# Patient Record
Sex: FEMALE | Race: WHITE | NOT HISPANIC OR LATINO | Employment: OTHER | URBAN - METROPOLITAN AREA
[De-identification: names, ages, dates, MRNs, and addresses within clinical notes are randomized per-mention and may not be internally consistent; named-entity substitution may affect disease eponyms.]

---

## 2017-01-03 ENCOUNTER — OFFICE VISIT (OUTPATIENT)
Dept: FAMILY MEDICINE | Facility: CLINIC | Age: 82
End: 2017-01-03

## 2017-01-03 VITALS — HEART RATE: 78 BPM | SYSTOLIC BLOOD PRESSURE: 122 MMHG | DIASTOLIC BLOOD PRESSURE: 80 MMHG | OXYGEN SATURATION: 97 %

## 2017-01-03 DIAGNOSIS — G57.02 LESION OF SCIATIC NERVE, LEFT: Primary | ICD-10-CM

## 2017-01-03 PROCEDURE — 99214 OFFICE O/P EST MOD 30 MIN: CPT | Performed by: FAMILY MEDICINE

## 2017-01-03 RX ORDER — GABAPENTIN 300 MG/1
CAPSULE ORAL
Qty: 90 CAPSULE | Refills: 0 | Status: SHIPPED | OUTPATIENT
Start: 2017-01-03 | End: 2017-01-25

## 2017-01-03 NOTE — PROGRESS NOTES
Left buttock pain starting about a week ago, getting bad about 5 days ago. She has had episodes like this in the past. No history of back trouble. Some radiation to the outside of the thigh. She iced it, and has used tylenol for this. No weakness  OBJECTIVE: /80 mmHg  Pulse 78  SpO2 97% NAD talkative. DTR's are fine. Strength is fine. Tenderness along the sciatic course. 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

## 2017-01-04 ASSESSMENT — PATIENT HEALTH QUESTIONNAIRE - PHQ9: SUM OF ALL RESPONSES TO PHQ QUESTIONS 1-9: 7

## 2017-01-06 ENCOUNTER — TELEPHONE (OUTPATIENT)
Dept: FAMILY MEDICINE | Facility: CLINIC | Age: 82
End: 2017-01-06

## 2017-01-09 ENCOUNTER — TELEPHONE (OUTPATIENT)
Dept: FAMILY MEDICINE | Facility: CLINIC | Age: 82
End: 2017-01-09

## 2017-01-10 NOTE — TELEPHONE ENCOUNTER
----- Message from Amadeo Macias MD sent at 1/9/2017  5:44 PM CST -----  Regarding: RE: sciatic not better   I would have her increase the gabapentin to 2 am, 1 noon, 1 pm; increase by one every 3 days up to 2 tid.  ----- Message -----     From: Leidy Sharma RN     Sent: 1/9/2017  10:40 AM       To: Amadeo Macias MD  Subject: sciatic not better                               Dr. Macias - she left message saying sciatica is not any better and asking what else can be done?   What do you recommend she do next?   Thanks  Leidy

## 2017-01-10 NOTE — TELEPHONE ENCOUNTER
1/10/17 patient calls - due to snowy weather, daughter unable to get her into clinic today. She plans to try increasing arin back to 1 TID and see how does. Will call if questions or concerns.  Leidy Sharma RN

## 2017-01-10 NOTE — TELEPHONE ENCOUNTER
"Called patient with Dr. Macias's recommendation - she is hesitant to increase arin to 3 per day since it caused her to be \"goofy, loopy\" last week at that dose. She is currently taking 300mg BID and tolerating but not getting any pain relief. States pain is severe and not sure what else to do. Offered appt with MD tomorrow to discuss again - patient states can't even walk to get in here. Advised if the pain is that bad, then maybe needs ER eval.   Discussed at length with patient unable to decide what she wants to do. Finally states will discuss with her daughter tomorrow and call NADIR.  Leidy Sharma RN    "

## 2017-01-12 ENCOUNTER — TELEPHONE (OUTPATIENT)
Dept: FAMILY MEDICINE | Facility: CLINIC | Age: 82
End: 2017-01-12

## 2017-01-12 NOTE — TELEPHONE ENCOUNTER
Tina called and states she is still having significant back pain.  She had tried a gel from her neighbor that seemed to help some but still experiencing back pain.  She is on the 3rd day of gabapentin 300 mg tid.  She was told to increase by one tab every 3 days until max of two  Tid.  Discussed if pain is bad she needs to go to the ER.  Also talked with daughter on the plan if worse to the ER and told how to increase the gabapentin.  Discussed taking tylenol 500 mg and could take up to two tabs tid.

## 2017-01-14 ENCOUNTER — TELEPHONE (OUTPATIENT)
Dept: FAMILY MEDICINE | Facility: CLINIC | Age: 82
End: 2017-01-14

## 2017-01-15 NOTE — TELEPHONE ENCOUNTER
Patient called and wondering if she could be on an anti inflammatory in addition to gabapentin.  She did admit she felt the back pain was slightly better.  She is tapering up on the gabapentin and taking 1 in am 1 mid day and 2 in the evening. Talked with Dr. Amadeo Macias and he said she could take Aleve -2 tabs bid with food for 2 weeks.  Informed patient and she will call if pain does not get better.

## 2017-01-17 ENCOUNTER — TELEPHONE (OUTPATIENT)
Dept: FAMILY MEDICINE | Facility: CLINIC | Age: 82
End: 2017-01-17

## 2017-01-26 NOTE — TELEPHONE ENCOUNTER
1/17/17 daughter, Rosario Raymond, calls to update Dr. Tian on status of patient's driving assessment. States assessment was scheduled for 1/19/17 but daughter rescheduled due to patient struggling last few weeks with severe back pain. Been seeing Dr. Macias about this. Has not driven in 3 weeks.   Plan: Dr. Tian informed.  Leidy Sharma RN

## 2017-02-13 DIAGNOSIS — F33.1 MAJOR DEPRESSIVE DISORDER, RECURRENT EPISODE, MODERATE (H): ICD-10-CM

## 2017-02-13 RX ORDER — VENLAFAXINE HYDROCHLORIDE 75 MG/1
CAPSULE, EXTENDED RELEASE ORAL
Qty: 90 CAPSULE | Refills: 3 | Status: SHIPPED | OUTPATIENT
Start: 2017-02-13 | End: 2018-02-22

## 2017-02-24 DIAGNOSIS — R10.13 DYSPEPSIA: ICD-10-CM

## 2017-02-24 DIAGNOSIS — R05.3 CHRONIC COUGH: ICD-10-CM

## 2017-02-27 ENCOUNTER — TELEPHONE (OUTPATIENT)
Dept: FAMILY MEDICINE | Facility: CLINIC | Age: 82
End: 2017-02-27

## 2017-02-27 DIAGNOSIS — M54.42 LEFT-SIDED LOW BACK PAIN WITH LEFT-SIDED SCIATICA: Primary | ICD-10-CM

## 2017-02-27 NOTE — TELEPHONE ENCOUNTER
Call from patient 02/23 that she has been seeing a chiropractor for her back but still having back pain.  She wants MRI done   Per Dr Macias patient can get MRI of lower back.   Dx:  Low back pain, hip, thigh pain.   Order sent to [facility] for patient to get done in [place] office.

## 2017-03-10 ENCOUNTER — TELEPHONE (OUTPATIENT)
Dept: FAMILY MEDICINE | Facility: CLINIC | Age: 82
End: 2017-03-10

## 2017-03-10 ENCOUNTER — TRANSFERRED RECORDS (OUTPATIENT)
Dept: FAMILY MEDICINE | Facility: CLINIC | Age: 82
End: 2017-03-10

## 2017-03-13 ENCOUNTER — TELEPHONE (OUTPATIENT)
Dept: FAMILY MEDICINE | Facility: CLINIC | Age: 82
End: 2017-03-13

## 2017-03-13 DIAGNOSIS — M51.369 DDD (DEGENERATIVE DISC DISEASE), LUMBAR: Primary | ICD-10-CM

## 2017-03-13 NOTE — TELEPHONE ENCOUNTER
Per MKW order entered to see Dr. Thompson at [facility] faxed with MRI results.  Patient states that she is not likely to f/u with surgeon however referral has been sent so she can determine this at a later date.  Ricardo

## 2017-04-18 ENCOUNTER — TELEPHONE (OUTPATIENT)
Dept: FAMILY MEDICINE | Facility: CLINIC | Age: 82
End: 2017-04-18

## 2017-04-18 DIAGNOSIS — F41.9 ANXIETY: Primary | ICD-10-CM

## 2017-04-18 DIAGNOSIS — F33.1 MAJOR DEPRESSIVE DISORDER, RECURRENT EPISODE, MODERATE (H): ICD-10-CM

## 2017-04-18 RX ORDER — ALPRAZOLAM 0.5 MG
0.5 TABLET ORAL PRN
Qty: 20 TABLET | Refills: 0 | Status: ON HOLD | COMMUNITY
Start: 2017-04-18 | End: 2018-06-13

## 2017-04-18 RX ORDER — VENLAFAXINE HYDROCHLORIDE 37.5 MG/1
CAPSULE, EXTENDED RELEASE ORAL
Qty: 30 CAPSULE | Refills: 1 | Status: SHIPPED | OUTPATIENT
Start: 2017-04-18 | End: 2017-06-08

## 2017-04-18 NOTE — TELEPHONE ENCOUNTER
"Patient calls reporting anxiety and depression worse lately. 4 months of sciatica getting her down - this is slowly getting better.   2 months ago son in law  and last week another death in family and  .   States needs something to help get by - asks for small amount xanax. States has used many years ago and daughter using small dose now with great results.   Also states that her daughter ('who is a nurse\") tells her to ask Dr. Tian if could increase her venlafaxine as current dose is not effective enough.   Plan: advised Dr. Tian is not in clinic today and will talk to her tomorrow to see if willing to rx or if needs to RTC to be seen. Patient agrees and will await our call.  Leidy Sharma RN    "

## 2017-04-18 NOTE — TELEPHONE ENCOUNTER
17 5pm Dr. Tian reviewed message from RN and responds  -- Okay for xanax 0.5 mg #20, no RF, may increase venlafaxine to 112.5 mg daily, then make follow up appointment for after the .     Patient currently gets venlafaxine ER 75mg capsules. Called pharmacist - recommends send additional rx for 37.5mg ER capsules to take with the 75mg capsule daily.   Alprazolam rx and venlafaxine ER rx sent to pharmacy and patient informed of Dr. Tian's recommendation. Should call as soon as able to make that follow up appt - 30 minute appt. Called patient and explained med dosing in detail and to follow up with Dr. Tian in next month. Patient agrees and will call early next week to make appt.   Leidy Sharma RN

## 2017-04-26 ENCOUNTER — TELEPHONE (OUTPATIENT)
Dept: FAMILY MEDICINE | Facility: CLINIC | Age: 82
End: 2017-04-26

## 2017-04-26 DIAGNOSIS — R10.13 DYSPEPSIA: Primary | ICD-10-CM

## 2017-04-26 DIAGNOSIS — R05.3 CHRONIC COUGH: ICD-10-CM

## 2017-04-26 RX ORDER — LANSOPRAZOLE 30 MG/1
30 CAPSULE, DELAYED RELEASE ORAL DAILY
Qty: 30 CAPSULE | Refills: 2 | Status: SHIPPED | OUTPATIENT
Start: 2017-04-26 | End: 2017-05-01

## 2017-04-26 NOTE — TELEPHONE ENCOUNTER
[facility] pharmacy calls regarding potential drug interaction between clopidogrel and omeprazole that patient takes. Pharm asks if Dr. Tian willing to change med to ranitidine or some other stomach med.   Per chart -- Patient has been on clopidogrel since 8/2013 CVA and omeprazole since 6/2016 for chronic cough.    Plan: per Dr. Tian - change to lansoprazole 30mg qd. Rx sent to pharmacy and patient informed.  Leidy Sharma RN

## 2017-05-01 NOTE — TELEPHONE ENCOUNTER
Pharmacist calling again regarding omep and plavix. Lansoprazole ordered last week in omep's place is not covered  -- other ppi's also would need prior authorization. omep covered well.   Patient and daughter, calling pharmacy frequently to clear up this issue. If ok to continue omep, need new rx sent.     Per our clinical pharmacist, Kadie: The evidence is not very clear and they have backed down on the extent of the interaction, so I think it is fine to continue. If Dr. Tian wants to switch the PPI, pantoprazole does not impact CYP2c9 like omeprazole, so in theory is less risk, but do not think it is absolutely necessary to switch given the current evidence.     Kadie Pruett, Pharm.D, [facility]   Medication Therapy Management Pharmacist   [phone]     Dr. Tian is out of office until 5/3. Ok per Dr. Macias (rehana TOLBERT) to continue omeprazole. Rx sent to pharmacy.  Leidy Sharma RN

## 2017-06-08 DIAGNOSIS — F33.1 MAJOR DEPRESSIVE DISORDER, RECURRENT EPISODE, MODERATE (H): ICD-10-CM

## 2017-06-08 RX ORDER — VENLAFAXINE HYDROCHLORIDE 37.5 MG/1
CAPSULE, EXTENDED RELEASE ORAL
Qty: 90 CAPSULE | Refills: 1 | Status: SHIPPED | OUTPATIENT
Start: 2017-06-08 | End: 2017-12-06

## 2017-07-26 ENCOUNTER — OFFICE VISIT (OUTPATIENT)
Dept: FAMILY MEDICINE | Facility: CLINIC | Age: 82
End: 2017-07-26

## 2017-07-26 VITALS
TEMPERATURE: 97.5 F | HEART RATE: 79 BPM | WEIGHT: 159 LBS | SYSTOLIC BLOOD PRESSURE: 130 MMHG | DIASTOLIC BLOOD PRESSURE: 70 MMHG | BODY MASS INDEX: 26.46 KG/M2 | OXYGEN SATURATION: 97 %

## 2017-07-26 DIAGNOSIS — E53.8 VITAMIN B12 DEFICIENCY (NON ANEMIC): ICD-10-CM

## 2017-07-26 DIAGNOSIS — D22.9 SUSPICIOUS NEVUS: ICD-10-CM

## 2017-07-26 DIAGNOSIS — R30.0 DYSURIA: Primary | ICD-10-CM

## 2017-07-26 LAB
BACTERIA URINE: ABNORMAL
BILIRUB UR QL STRIP: 0
BLOOD URINE DIP: ABNORMAL
CASTS/LPF: ABNORMAL
COLOR UR: YELLOW
CRYSTAL URINE: ABNORMAL
EPITHELIAL CELLS - QUEST: ABNORMAL
GLUCOSE UR STRIP-MCNC: 0 MG/DL
KETONES UR QL STRIP: 0
LEUKOCYTE ESTERASE URINE DIP: ABNORMAL
MUCOUS URINE: ABNORMAL
NITRITE UR QL STRIP: ABNORMAL
PH UR STRIP: 5.5 PH (ref 5–9)
PROT UR QL: ABNORMAL MG/DL (ref ?–0.01)
RBC URINE: ABNORMAL (ref 0–3)
SP GR UR STRIP: 1.01 (ref 1–1.02)
UROBILINOGEN UR QL STRIP: 0.2 EU/DL (ref 0.2–1)
WBC URINE: ABNORMAL (ref 0–3)

## 2017-07-26 PROCEDURE — 11400 EXC TR-EXT B9+MARG 0.5 CM<: CPT | Performed by: FAMILY MEDICINE

## 2017-07-26 PROCEDURE — 88305 TISSUE EXAM BY PATHOLOGIST: CPT | Mod: 90 | Performed by: FAMILY MEDICINE

## 2017-07-26 PROCEDURE — 88342 IMHCHEM/IMCYTCHM 1ST ANTB: CPT | Mod: 90 | Performed by: FAMILY MEDICINE

## 2017-07-26 PROCEDURE — 99213 OFFICE O/P EST LOW 20 MIN: CPT | Mod: 25 | Performed by: FAMILY MEDICINE

## 2017-07-26 PROCEDURE — 81003 URINALYSIS AUTO W/O SCOPE: CPT | Performed by: FAMILY MEDICINE

## 2017-07-26 RX ORDER — CIPROFLOXACIN 250 MG/1
250 TABLET, FILM COATED ORAL 2 TIMES DAILY
Qty: 6 TABLET | Refills: 0 | Status: SHIPPED | OUTPATIENT
Start: 2017-07-26 | End: 2017-08-04

## 2017-07-26 RX ORDER — CYANOCOBALAMIN 1000 UG/ML
1 INJECTION, SOLUTION INTRAMUSCULAR; SUBCUTANEOUS
COMMUNITY

## 2017-07-26 ASSESSMENT — ANXIETY QUESTIONNAIRES
6. BECOMING EASILY ANNOYED OR IRRITABLE: SEVERAL DAYS
1. FEELING NERVOUS, ANXIOUS, OR ON EDGE: SEVERAL DAYS
2. NOT BEING ABLE TO STOP OR CONTROL WORRYING: NOT AT ALL
3. WORRYING TOO MUCH ABOUT DIFFERENT THINGS: SEVERAL DAYS
7. FEELING AFRAID AS IF SOMETHING AWFUL MIGHT HAPPEN: NOT AT ALL
IF YOU CHECKED OFF ANY PROBLEMS ON THIS QUESTIONNAIRE, HOW DIFFICULT HAVE THESE PROBLEMS MADE IT FOR YOU TO DO YOUR WORK, TAKE CARE OF THINGS AT HOME, OR GET ALONG WITH OTHER PEOPLE: SOMEWHAT DIFFICULT
5. BEING SO RESTLESS THAT IT IS HARD TO SIT STILL: NOT AT ALL
GAD7 TOTAL SCORE: 4

## 2017-07-26 ASSESSMENT — PATIENT HEALTH QUESTIONNAIRE - PHQ9: 5. POOR APPETITE OR OVEREATING: SEVERAL DAYS

## 2017-07-26 NOTE — PROGRESS NOTES
Problem(s) Oriented visit        SUBJECTIVE:                                                    Tina Powell is a 84 year old female who presents to clinic today for two issues. She reports having increased darkness of urine color and getting up more for urination during the night. She has started leaking urine again. Symptoms have been present for about a week. She has urgency of urination. No dysuria. No fever. No mid back pain.    She also has some abnormal skin lesions that she wants checked. There is no itching or bleeding. No pain. No personal or family history of skin cancer.        Problem list, Medication list, Allergies, and Medical/Social/Surgical histories reviewed in [facility] and updated as appropriate.   Additional history: as documented    ROS:  5 point ROS completed and negative except noted above, including Gen, CV, Resp, GI, MS      Histories:   Patient Active Problem List   Diagnosis     Psoriasis     Dyslipidemia     Health Care Home     Advance Care Planning     H/O: CVA (cerebrovascular accident)     Cerebral infarction (H)     Risk for falls     Sepsis (H)     Major depressive disorder, recurrent episode, moderate (H)     Alcoholism (H)     Tobacco abuse     Past Surgical History:   Procedure Laterality Date     HYSTERECTOMY  1950    fibroids       Social History   Substance Use Topics     Smoking status: Current Some Day Smoker     Smokeless tobacco: Never Used      Comment: Social, states she does not smoke daily, about 12 cigs per week     Alcohol use 1.8 - 2.4 oz/week     1 - 2 Glasses of wine, 2 Standard drinks or equivalent per week     Family History   Problem Relation Age of Onset     HEART DISEASE Mother      CEREBROVASCULAR DISEASE Father            OBJECTIVE:                                                    /70  Pulse 79  Temp 97.5  F (36.4  C) (Oral)  Wt 72.1 kg (159 lb)  SpO2 97%  BMI 26.46 kg/m2  Body mass index is 26.46 kg/(m^2).   GENERAL APPEARANCE: Alert, no  acute distress  ABDOMEN: soft, no organomegaly, masses or tenderness  MS: extremities normal, no peripheral edema  Back without CVA tenderness  SKIN: mid low back has a variable colored nevus with irregular borders and dark black center.   NEURO: Alert, oriented, speech and mentation normal  PSYCH: mentation appears normal, affect and mood normal    PROCEDURE:  Suspicious nevus was prepped with betadine, Lidocaine 2% with epi was used for anesthetic. Elliptical excision of the nevus was done and the specimen sent for pathology. Three interrupted sutures using 4-0 ethilon were placed with good closure and good hemostasis.     Labs Resulted Today:   Results for orders placed or performed in visit on 07/26/17   Urinalysis w/reflex protein, bili ([facility])   Result Value Ref Range    Color Urine Yellow     pH Urine 5.5 5 - 9 pH    Specific Gravity Urine 1.010 1.005 - 1.025    Protein Urine 1+ (A) 0.01 mg/dL    Glucose Urine 0     Ketones Urine 0     Leukocyte Esterase Urine 3+ (A)     Blood Urine 2+ (A)     Nitrite Urine Neg NEG    Bilirubin Urine Dip 0     Urobilinogen Urine 0.2 0.2 - 1.0 EU/dL    WBC Urine  0 - 3    RBC Urine  0 - 3    Epithelial Cells      Crystal Urine      Bacteria Urine      Mucous Urine      Casts/LPF      Impression    QNS for Micro     ASSESSMENT/PLAN:                                                        Tina was seen today for derm problem, urinary problem and other.    Diagnoses and all orders for this visit:    Dysuria  -     Urinalysis w/reflex protein, bili ([facility])    Other orders  -     DEPRESSION ACTION PLAN (DAP)    Suspicious nevus  Removed today, f/u in ten days for suture removal.     There are no Patient Instructions on file for this visit.    The following health maintenance items are reviewed in Epic and correct as of today:  Health Maintenance   Topic Date Due     INFLUENZA VACCINE (SYSTEM ASSIGNED)  09/01/2017     FALL RISK ASSESSMENT  01/03/2018     PHQ-9 Q6 MONTHS  01/26/2018      DEPRESSION ACTION PLAN Q1 YR  07/26/2018     TETANUS IMMUNIZATION (SYSTEM ASSIGNED)  07/27/2020     ADVANCE DIRECTIVE PLANNING Q5 YRS  11/10/2020     PNEUMOCOCCAL  Completed       Dian Tian MD  [facility]  [phone]    For any issues my office # is [phone]      The following medication was given:     MEDICATION: Vitamin B12  1000mcg  ROUTE: IM  SITE: Arm - Left  DOSE: 1 mL  LOT #: [id number]  :  [employer]  EXPIRATION DATE:  3/2018  NDC#: 2385-4506-00  Brandy Duncan RT(R), MA

## 2017-07-26 NOTE — LETTER
My Depression Action Plan  Name: Tina Powell   Date of Birth 10/7/1932  Date: 7/26/2017    My doctor: Dian Tian   My clinic: [facility] [address] [place] [address] [phone]          GREEN    ZONE   Good Control    What it looks like:     Things are going generally well. You have normal up s and down s. You may even feel depressed from time to time, but bad moods usually last less than a day.   What you need to do:  1. Continue to care for yourself (see self care plan)  2. Check your depression survival kit and update it as needed  3. Follow your physician s recommendations including any medication.  4. Do not stop taking medication unless you consult with your physician first.           YELLOW         ZONE Getting Worse    What it looks like:     Depression is starting to interfere with your life.     It may be hard to get out of bed; you may be starting to isolate yourself from others.    Symptoms of depression are starting to last most all day and this has happened for several days.     You may have suicidal thoughts but they are not constant.   What you need to do:     1. Call your care team, your response to treatment will improve if you keep your care team informed of your progress. Yellow periods are signs an adjustment may need to be made.     2. Continue your self-care, even if you have to fake it!    3. Talk to someone in your support network    4. Open up your depression survival kit           RED    ZONE Medical Alert - Get Help    What it looks like:     Depression is seriously interfering with your life.     You may experience these or other symptoms: You can t get out of bed most days, can t work or engage in other necessary activities, you have trouble taking care of basic hygiene, or basic responsibilities, thoughts of suicide or death that will not go away, self-injurious behavior.     What you need to do:  1. Call your care  team and request a same-day appointment. If they are not available (weekends or after hours) call your local crisis line, emergency room or 911.      Electronically signed by: Brandy Duncan, July 26, 2017    Depression Self Care Plan / Survival Kit    Self-Care for Depression  Here s the deal. Your body and mind are really not as separate as most people think.  What you do and think affects how you feel and how you feel influences what you do and think. This means if you do things that people who feel good do, it will help you feel better.  Sometimes this is all it takes.  There is also a place for medication and therapy depending on how severe your depression is, so be sure to consult with your medical provider and/ or Behavioral Health Consultant if your symptoms are worsening or not improving.     In order to better manage my stress, I will:    Exercise  Get some form of exercise, every day. This will help reduce pain and release endorphins, the  feel good  chemicals in your brain. This is almost as good as taking antidepressants!  This is not the same as joining a gym and then never going! (they count on that by the way ) It can be as simple as just going for a walk or doing some gardening, anything that will get you moving.      Hygiene   Maintain good hygiene (Get out of bed in the morning, Make your bed, Brush your teeth, Take a shower, and Get dressed like you were going to work, even if you are unemployed).  If your clothes don't fit try to get ones that do.    Diet  I will strive to eat foods that are good for me, drink plenty of water, and avoid excessive sugar, caffeine, alcohol, and other mood-altering substances.  Some foods that are helpful in depression are: complex carbohydrates, B vitamins, flaxseed, fish or fish oil, fresh fruits and vegetables.    Psychotherapy  I agree to participate in Individual Therapy (if recommended).    Medication  If prescribed medications, I agree to take them.   Missing doses can result in serious side effects.  I understand that drinking alcohol, or other illicit drug use, may cause potential side effects.  I will not stop my medication abruptly without first discussing it with my provider.    Staying Connected With Others  I will stay in touch with my friends, family members, and my primary care provider/team.    Use your imagination  Be creative.  We all have a creative side; it doesn t matter if it s oil painting, sand castles, or mud pies! This will also kick up the endorphins.    Witness Beauty  (AKA stop and smell the roses) Take a look outside, even in mid-winter. Notice colors, textures. Watch the squirrels and birds.     Service to others  Be of service to others.  There is always someone else in need.  By helping others we can  get out of ourselves  and remember the really important things.  This also provides opportunities for practicing all the other parts of the program.    Humor  Laugh and be silly!  Adjust your TV habits for less news and crime-drama and more comedy.    Control your stress  Try breathing deep, massage therapy, biofeedback, and meditation. Find time to relax each day.     My support system    Clinic Contact:  Phone number:    Contact 1:  Phone number:    Contact 2:  Phone number:    Alevism/:  Phone number:    Therapist:  Phone number:    Local crisis center:    Phone number:    Other community support:  Phone number:

## 2017-07-26 NOTE — NURSING NOTE
The following medication was given:     MEDICATION: Vitamin B12  1000mcg  ROUTE: IM  SITE: Arm - Left  DOSE: 1 mL  LOT #: [id number]  :  [employer] [place]  EXPIRATION DATE:  3/2018  NDC#: 1837-0289-77  Brandy Duncan RT(R), MA

## 2017-07-26 NOTE — MR AVS SNAPSHOT
"              After Visit Summary   2017    Tina Powell    MRN: [id number]           Patient Information     Date Of Birth          10/7/1932        Visit Information        Provider Department      2017 3:45 PM Dian Tian MD [facility]        Today's Diagnoses     Dysuria    -  1    Suspicious nevus        Vitamin B12 deficiency (non anemic)           Follow-ups after your visit        Who to contact     If you have questions or need follow up information about today's clinic visit or your schedule please contact [facility] directly at [phone].  Normal or non-critical lab and imaging results will be communicated to you by Wee Webhart, letter or phone within 4 business days after the clinic has received the results. If you do not hear from us within 7 days, please contact the clinic through Wee Webhart or phone. If you have a critical or abnormal lab result, we will notify you by phone as soon as possible.  Submit refill requests through LaunchGram or call your pharmacy and they will forward the refill request to us. Please allow 3 business days for your refill to be completed.          Additional Information About Your Visit        MyChart Information     LaunchGram lets you send messages to your doctor, view your test results, renew your prescriptions, schedule appointments and more. To sign up, go to [email or web address] . Click on \"Log in\" on the left side of the screen, which will take you to the Welcome page. Then click on \"Sign up Now\" on the right side of the page.     You will be asked to enter the access code listed below, as well as some personal information. Please follow the directions to create your username and password.     Your access code is: [email or web address]  Expires: 10/26/2017  4:31 PM     Your access code will  in 90 days. If you need help or a new code, please call your [facility] clinic or [phone].        Care EveryWhere ID     This is " your Care EveryWhere ID. This could be used by other organizations to access your [facility] medical records  [id number]        Your Vitals Were     Pulse Temperature Pulse Oximetry BMI (Body Mass Index)          79 97.5  F (36.4  C) (Oral) 97% 26.46 kg/m2         Blood Pressure from Last 3 Encounters:   07/26/17 130/70   01/03/17 122/80   10/13/16 122/82    Weight from Last 3 Encounters:   07/26/17 72.1 kg (159 lb)   10/13/16 71.7 kg (158 lb)   07/28/16 71.7 kg (158 lb)              We Performed the Following     DEPRESSION ACTION PLAN (DAP)     EXC BENIGN SKIN LESION TRUNK/ARM/LEG <=0.5 CM     Pathology Report ([facility])     Urinalysis w/reflex protein, bili ([facility])     Urine Culture  Routine ([facility])          Today's Medication Changes          These changes are accurate as of: 7/26/17 11:59 PM.  If you have any questions, ask your nurse or doctor.               Start taking these medicines.        Dose/Directions    ciprofloxacin 250 MG tablet   Commonly known as:  CIPRO   Used for:  Dysuria   Started by:  Dian Tian MD        Dose:  250 mg   Take 1 tablet (250 mg) by mouth 2 times daily   Quantity:  6 tablet   Refills:  0            Where to get your medicines      These medications were sent to [facility]/pharmacy #[id number] [facility] [address], [place] [address]     Phone:  [phone]     ciprofloxacin 250 MG tablet                Primary Care Provider Office Phone # Fax #    Dian Tian [phone]       [facility] [address] [place] [address]        Equal Access to Services     [facility] AH: Hadii burak lopez Somahin, waaxda luqadaha, qaybta kaalmada fany, cele owen. So [facility] [phone].    ATENCIÓN: Si habla español, tiene a marquez disposición servicios gratuitos de asistencia lingüística. Llame al [phone].    We comply with applicable federal civil rights laws and  [place] laws. We do not discriminate on the basis of race, color, national origin, age, disability sex, sexual orientation or gender identity.            Thank you!     Thank you for choosing [facility]  for your care. Our goal is always to provide you with excellent care. Hearing back from our patients is one way we can continue to improve our services. Please take a few minutes to complete the written survey that you may receive in the mail after your visit with us. Thank you!             Your Updated Medication List - Protect others around you: Learn how to safely use, store and throw away your medicines at www.disposemymeds.org.          This list is accurate as of: 7/26/17 11:59 PM.  Always use your most recent med list.                   Brand Name Dispense Instructions for use Diagnosis    acetaminophen 325 MG tablet    TYLENOL    20 tablet    Take 1 tablet (325 mg) by mouth every 6 hours as needed for mild pain    Sepsis (H)       ALPRAZolam 0.5 MG tablet    XANAX    20 tablet    Take 1 tablet (0.5 mg) by mouth 2 times daily as needed for anxiety    Anxiety       benzonatate 100 MG capsule    TESSALON    42 capsule    Take 1 capsule (100 mg) by mouth 3 times daily as needed for cough    Acute bronchitis with symptoms > 10 days       ciprofloxacin 250 MG tablet    CIPRO    6 tablet    Take 1 tablet (250 mg) by mouth 2 times daily    Dysuria       clobetasol 0.05 % ointment    TEMOVATE     Apply 1 Application topically Applies 2-3 times a week        clopidogrel 75 MG tablet    PLAVIX    90 tablet    Take 1 tablet (75 mg) by mouth daily    Encounter for medication refill       Co Q 10 100 MG Caps      Take 200 mg by mouth daily        cyanocobalamin 1000 MCG/ML injection    VITAMIN B12     Inject 1 mL into the muscle every 30 days        gabapentin 300 MG capsule    NEURONTIN    90 capsule    TAKE ONE CAPSULE BY MOUTH NIGHTLY X 1 DAY, THEN 1 CAP TWICE DAILY X 1 DAY, 1 CAP 3 TIMES DAILY    Lesion  of left sciatic nerve       gemfibrozil 600 MG tablet    LOPID    60 tablet    Take 1 tablet (600 mg) by mouth 2 times daily    Elevated triglycerides with high cholesterol       HUMIRA 20 MG/0.4ML Kit   Generic drug:  adalimumab      Inject Subcutaneous every 14 days    Psoriasis       ipratropium 17 MCG/ACT Inhaler    ATROVENT HFA    1 Inhaler    Inhale 2 puffs into the lungs 4 times daily    SOB (shortness of breath)       Magnesium 400 MG Caps      Take 1 tablet by mouth daily    Major depressive disorder, recurrent episode, moderate (H)       MELATONIN PO      Take 3 mg by mouth nightly as needed        omega 3 1000 MG Caps     90 capsule    Take 3 grams daily    Mixed hyperlipidemia       omeprazole 20 MG CR capsule    priLOSEC    30 capsule    Take 1 capsule (20 mg) by mouth daily    Chronic cough, Dyspepsia       pyridoxine 50 MG Tabs    VITAMIN B-6     Take 100 mg by mouth daily    Major depressive disorder, recurrent episode, moderate (H)       * venlafaxine 75 MG 24 hr capsule    EFFEXOR-XR    90 capsule    Take 1 capsule by mouth daily    Major depressive disorder, recurrent episode, moderate (H)       * venlafaxine 37.5 MG 24 hr capsule    EFFEXOR-XR    90 capsule    Take 1 capsule daily along with your 75mg capsule for total dose of 112.5mg qd.    Major depressive disorder, recurrent episode, moderate (H)       vitamin D 2000 UNITS tablet     90 tablet    Take 2,000 Units by mouth daily.    Vitamin deficiency       * Notice:  This list has 2 medication(s) that are the same as other medications prescribed for you. Read the directions carefully, and ask your doctor or other care provider to review them with you.

## 2017-07-27 ASSESSMENT — PATIENT HEALTH QUESTIONNAIRE - PHQ9: SUM OF ALL RESPONSES TO PHQ QUESTIONS 1-9: 5

## 2017-07-27 ASSESSMENT — ANXIETY QUESTIONNAIRES: GAD7 TOTAL SCORE: 4

## 2017-07-29 LAB
ANTIMICROBIAL SUSCEPTIBILITY: ABNORMAL
Lab: ABNORMAL
URINE CULTURE: ABNORMAL

## 2017-08-04 ENCOUNTER — OFFICE VISIT (OUTPATIENT)
Dept: FAMILY MEDICINE | Facility: CLINIC | Age: 82
End: 2017-08-04

## 2017-08-04 VITALS
OXYGEN SATURATION: 98 % | SYSTOLIC BLOOD PRESSURE: 128 MMHG | WEIGHT: 159 LBS | DIASTOLIC BLOOD PRESSURE: 82 MMHG | RESPIRATION RATE: 16 BRPM | TEMPERATURE: 98.4 F | BODY MASS INDEX: 26.46 KG/M2 | HEART RATE: 74 BPM

## 2017-08-04 DIAGNOSIS — Z48.02 VISIT FOR SUTURE REMOVAL: Primary | ICD-10-CM

## 2017-08-04 PROCEDURE — 99207 ZZC DROP WITH A PROCEDURE: CPT | Mod: 25 | Performed by: FAMILY MEDICINE

## 2017-08-04 NOTE — PROGRESS NOTES
Problem(s) Oriented visit        SUBJECTIVE:                                                    Tina Powell is a 84 year old female who presents to clinic today for suture removal.  Pathology report is not available.  She has no complaints.      Problem list, Medication list, Allergies, and Medical/Social/Surgical histories reviewed in EPIC and updated as appropriate.   Additional history: as documented    ROS:  5 point ROS completed and negative except noted above, including Gen, CV, Resp, GI, MS      Histories:   Patient Active Problem List   Diagnosis     Psoriasis     Dyslipidemia     Health Care Home     Advance Care Planning     H/O: CVA (cerebrovascular accident)     Cerebral infarction (H)     Risk for falls     Sepsis (H)     Major depressive disorder, recurrent episode, moderate (H)     Alcoholism (H)     Tobacco abuse     Past Surgical History:   Procedure Laterality Date     HYSTERECTOMY  1950    fibroids       Social History   Substance Use Topics     Smoking status: Current Some Day Smoker     Smokeless tobacco: Never Used      Comment: Social, states she does not smoke daily, about 12 cigs per week     Alcohol use 1.8 - 2.4 oz/week     1 - 2 Glasses of wine, 2 Standard drinks or equivalent per week     Family History   Problem Relation Age of Onset     HEART DISEASE Mother      CEREBROVASCULAR DISEASE Father            OBJECTIVE:                                                    /82  Pulse 74  Temp 98.4  F (36.9  C) (Oral)  Resp 16  Wt 72.1 kg (159 lb)  SpO2 98%  BMI 26.46 kg/m2  Body mass index is 26.46 kg/(m^2).   GENERAL APPEARANCE: Alert, no acute distress  SKIN: low back with well-healing excision site. 3 interrupted sutures are removed without incident.  Wound is clean and dry.  NEURO: Alert, oriented, speech and mentation normal  PSYCH: mentation appears normal, affect and mood normal   Labs Resulted Today:   Results for orders placed or performed in visit on 07/26/17    Urinalysis w/reflex protein, bili ([facility])   Result Value Ref Range    Color Urine Yellow     pH Urine 5.5 5 - 9 pH    Specific Gravity Urine 1.010 1.005 - 1.025    Protein Urine 1+ (A) 0.01 mg/dL    Glucose Urine 0     Ketones Urine 0     Leukocyte Esterase Urine 3+ (A)     Blood Urine 2+ (A)     Nitrite Urine Neg NEG    Bilirubin Urine Dip 0     Urobilinogen Urine 0.2 0.2 - 1.0 EU/dL    WBC Urine  0 - 3    RBC Urine  0 - 3    Epithelial Cells      Crystal Urine      Bacteria Urine      Mucous Urine      Casts/LPF      Impression    QNS for Micro   Urine Culture  Routine ([facility])   Result Value Ref Range    Urine Culture Final report (A)     Result 1 Escherichia coli (A)     Antimicrobial Susceptibility Comment     Narrative    Performed at:  01 - [facility] [address], [place], [place]  [id number]  : Noe Madrigal MD, Phone:  [phone]     ASSESSMENT/PLAN:                                                        There are no diagnoses linked to this encounter.        The following health maintenance items are reviewed in Epic and correct as of today:  Health Maintenance   Topic Date Due     INFLUENZA VACCINE (SYSTEM ASSIGNED)  09/01/2017     FALL RISK ASSESSMENT  01/03/2018     PHQ-9 Q6 MONTHS  01/26/2018     DEPRESSION ACTION PLAN Q1 YR  07/26/2018     TETANUS IMMUNIZATION (SYSTEM ASSIGNED)  07/27/2020     ADVANCE DIRECTIVE PLANNING Q5 YRS  11/10/2020     PNEUMOCOCCAL  Completed       Dian Tian MD  [facility]  [phone]    For any issues my office # is [phone]

## 2017-08-04 NOTE — MR AVS SNAPSHOT
"              After Visit Summary   2017    Tina Powell    MRN: [id number]           Patient Information     Date Of Birth          10/7/1932        Visit Information        Provider Department      2017 2:00 PM Dian Tian MD [facility]        Today's Diagnoses     Visit for suture removal    -  1       Follow-ups after your visit        Who to contact     If you have questions or need follow up information about today's clinic visit or your schedule please contact [facility] directly at [phone].  Normal or non-critical lab and imaging results will be communicated to you by Telvent Githart, letter or phone within 4 business days after the clinic has received the results. If you do not hear from us within 7 days, please contact the clinic through Hygeia The[employer] or phone. If you have a critical or abnormal lab result, we will notify you by phone as soon as possible.  Submit refill requests through Cell Genesys or call your pharmacy and they will forward the refill request to us. Please allow 3 business days for your refill to be completed.          Additional Information About Your Visit        MyChart Information     Cell Genesys lets you send messages to your doctor, view your test results, renew your prescriptions, schedule appointments and more. To sign up, go to [email or web address] . Click on \"Log in\" on the left side of the screen, which will take you to the Welcome page. Then click on \"Sign up Now\" on the right side of the page.     You will be asked to enter the access code listed below, as well as some personal information. Please follow the directions to create your username and password.     Your access code is: [email or web address]  Expires: 10/26/2017  4:31 PM     Your access code will  in 90 days. If you need help or a new code, please call your [facility] or [phone].        Care EveryWhere ID     This is your Care EveryWhere ID. This could be used by other " organizations to access your [facility] medical records  [id number]        Your Vitals Were     Pulse Temperature Respirations Pulse Oximetry BMI (Body Mass Index)       74 98.4  F (36.9  C) (Oral) 16 98% 26.46 kg/m2        Blood Pressure from Last 3 Encounters:   08/04/17 128/82   07/26/17 130/70   01/03/17 122/80    Weight from Last 3 Encounters:   08/04/17 72.1 kg (159 lb)   07/26/17 72.1 kg (159 lb)   10/13/16 71.7 kg (158 lb)              Today, you had the following     No orders found for display       Primary Care Provider Office Phone # Fax #    Dian Janette Tian [phone]       [facility] [address]  [facility] [address]        Equal Access to Services     [facility] CONRADO : Hadii aad mert hadasho Soomaali, waaxda luqadaha, qaybta kaalmada adeegyada, cele hamm hayshawna marin . So [facility] [phone].    ATENCIÓN: Si habla español, tiene a marquez disposición servicios gratuitos de asistencia lingüística. Morris al [phone].    We comply with applicable federal civil rights laws and [place] laws. We do not discriminate on the basis of race, color, national origin, age, disability sex, sexual orientation or gender identity.            Thank you!     Thank you for choosing [facility]  for your care. Our goal is always to provide you with excellent care. Hearing back from our patients is one way we can continue to improve our services. Please take a few minutes to complete the written survey that you may receive in the mail after your visit with us. Thank you!             Your Updated Medication List - Protect others around you: Learn how to safely use, store and throw away your medicines at www.disposemymeds.org.          This list is accurate as of: 8/4/17  4:35 PM.  Always use your most recent med list.                   Brand Name Dispense Instructions for use Diagnosis    acetaminophen 325 MG tablet    TYLENOL    20 tablet    Take 1 tablet (325  mg) by mouth every 6 hours as needed for mild pain    Sepsis (H)       ALPRAZolam 0.5 MG tablet    XANAX    20 tablet    Take 1 tablet (0.5 mg) by mouth 2 times daily as needed for anxiety    Anxiety       benzonatate 100 MG capsule    TESSALON    42 capsule    Take 1 capsule (100 mg) by mouth 3 times daily as needed for cough    Acute bronchitis with symptoms > 10 days       clobetasol 0.05 % ointment    TEMOVATE     Apply 1 Application topically Applies 2-3 times a week        clopidogrel 75 MG tablet    PLAVIX    90 tablet    Take 1 tablet (75 mg) by mouth daily    Encounter for medication refill       Co Q 10 100 MG Caps      Take 200 mg by mouth daily        cyanocobalamin 1000 MCG/ML injection    VITAMIN B12     Inject 1 mL into the muscle every 30 days        gabapentin 300 MG capsule    NEURONTIN    90 capsule    TAKE ONE CAPSULE BY MOUTH NIGHTLY X 1 DAY, THEN 1 CAP TWICE DAILY X 1 DAY, 1 CAP 3 TIMES DAILY    Lesion of left sciatic nerve       gemfibrozil 600 MG tablet    LOPID    60 tablet    Take 1 tablet (600 mg) by mouth 2 times daily    Elevated triglycerides with high cholesterol       HUMIRA 20 MG/0.4ML Kit   Generic drug:  adalimumab      Inject Subcutaneous every 14 days    Psoriasis       ipratropium 17 MCG/ACT Inhaler    ATROVENT HFA    1 Inhaler    Inhale 2 puffs into the lungs 4 times daily    SOB (shortness of breath)       Magnesium 400 MG Caps      Take 1 tablet by mouth daily    Major depressive disorder, recurrent episode, moderate (H)       MELATONIN PO      Take 3 mg by mouth nightly as needed        omega 3 1000 MG Caps     90 capsule    Take 3 grams daily    Mixed hyperlipidemia       omeprazole 20 MG CR capsule    priLOSEC    30 capsule    Take 1 capsule (20 mg) by mouth daily    Chronic cough, Dyspepsia       pyridoxine 50 MG Tabs    VITAMIN B-6     Take 100 mg by mouth daily    Major depressive disorder, recurrent episode, moderate (H)       * venlafaxine 75 MG 24 hr capsule     EFFEXOR-XR    90 capsule    Take 1 capsule by mouth daily    Major depressive disorder, recurrent episode, moderate (H)       * venlafaxine 37.5 MG 24 hr capsule    EFFEXOR-XR    90 capsule    Take 1 capsule daily along with your 75mg capsule for total dose of 112.5mg qd.    Major depressive disorder, recurrent episode, moderate (H)       vitamin D 2000 UNITS tablet     90 tablet    Take 2,000 Units by mouth daily.    Vitamin deficiency       * Notice:  This list has 2 medication(s) that are the same as other medications prescribed for you. Read the directions carefully, and ask your doctor or other care provider to review them with you.

## 2017-08-10 LAB
.: NORMAL
CLINICIAN PROVIDED ICD10: NORMAL
PATHOLOGIST PROVIDED ICD10: NORMAL

## 2017-08-14 ENCOUNTER — TELEPHONE (OUTPATIENT)
Dept: FAMILY MEDICINE | Facility: CLINIC | Age: 82
End: 2017-08-14

## 2017-08-14 DIAGNOSIS — R05.3 CHRONIC COUGH: ICD-10-CM

## 2017-08-14 DIAGNOSIS — R10.13 DYSPEPSIA: ICD-10-CM

## 2017-08-14 NOTE — TELEPHONE ENCOUNTER
Patient notified of results per Dr. Tian.  Advised patient to RTC if lesion reoccurs.  Patient agrees.  Ruba Stokes

## 2017-08-14 NOTE — TELEPHONE ENCOUNTER
omeprazole---last seen 8/4/17 (unrelated to this refill request).  Per dictation this medication not addressed in last year.

## 2017-08-14 NOTE — TELEPHONE ENCOUNTER
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 This is not cancer but likely would have turned into cancer if not removed. The margins are clear. No further treatment needed as long as there is no recurrence.

## 2017-09-29 ENCOUNTER — TELEPHONE (OUTPATIENT)
Dept: FAMILY MEDICINE | Facility: CLINIC | Age: 82
End: 2017-09-29

## 2017-10-03 ENCOUNTER — OFFICE VISIT (OUTPATIENT)
Dept: FAMILY MEDICINE | Facility: CLINIC | Age: 82
End: 2017-10-03

## 2017-10-03 VITALS
DIASTOLIC BLOOD PRESSURE: 90 MMHG | HEART RATE: 74 BPM | BODY MASS INDEX: 26.63 KG/M2 | OXYGEN SATURATION: 96 % | SYSTOLIC BLOOD PRESSURE: 158 MMHG | WEIGHT: 160 LBS

## 2017-10-03 DIAGNOSIS — E78.00 HYPERCHOLESTEREMIA: ICD-10-CM

## 2017-10-03 DIAGNOSIS — I10 BENIGN ESSENTIAL HYPERTENSION: ICD-10-CM

## 2017-10-03 DIAGNOSIS — E53.8 VITAMIN B12 DEFICIENCY (NON ANEMIC): ICD-10-CM

## 2017-10-03 DIAGNOSIS — Z12.31 ENCOUNTER FOR SCREENING MAMMOGRAM FOR BREAST CANCER: ICD-10-CM

## 2017-10-03 DIAGNOSIS — F17.200 CURRENT SMOKER: ICD-10-CM

## 2017-10-03 DIAGNOSIS — E55.9 VITAMIN D DEFICIENCY: ICD-10-CM

## 2017-10-03 DIAGNOSIS — Z11.1 SCREENING EXAMINATION FOR PULMONARY TUBERCULOSIS: ICD-10-CM

## 2017-10-03 DIAGNOSIS — Z23 NEED FOR PROPHYLACTIC VACCINATION AND INOCULATION AGAINST INFLUENZA: Primary | ICD-10-CM

## 2017-10-03 DIAGNOSIS — M85.89 OSTEOPENIA OF MULTIPLE SITES: ICD-10-CM

## 2017-10-03 LAB
% GRANULOCYTES: 62.8 % (ref 42.2–75.2)
HCT VFR BLD AUTO: 45.2 % (ref 35–46)
HEMOGLOBIN: 14.5 G/DL (ref 11.8–15.5)
LYMPHOCYTES NFR BLD AUTO: 30.5 % (ref 20.5–51.1)
MCH RBC QN AUTO: 27.9 PG (ref 27–31)
MCHC RBC AUTO-ENTMCNC: 32.1 G/DL (ref 33–37)
MCV RBC AUTO: 86.9 FL (ref 80–100)
MONOCYTES NFR BLD AUTO: 6.7 % (ref 1.7–9.3)
PLATELET # BLD AUTO: 269 K/UL (ref 140–450)
PPDINDURATION: NORMAL MM (ref 0–5)
PPDREDNESS: NORMAL MM
RBC # BLD AUTO: 5.2 X10/CMM (ref 3.7–5.2)
WBC # BLD AUTO: 9.2 X10/CMM (ref 3.8–11)

## 2017-10-03 PROCEDURE — 80053 COMPREHEN METABOLIC PANEL: CPT | Mod: 90 | Performed by: FAMILY MEDICINE

## 2017-10-03 PROCEDURE — 90662 IIV NO PRSV INCREASED AG IM: CPT | Performed by: FAMILY MEDICINE

## 2017-10-03 PROCEDURE — 86580 TB INTRADERMAL TEST: CPT | Performed by: FAMILY MEDICINE

## 2017-10-03 PROCEDURE — 82607 VITAMIN B-12: CPT | Mod: 90 | Performed by: FAMILY MEDICINE

## 2017-10-03 PROCEDURE — 82306 VITAMIN D 25 HYDROXY: CPT | Mod: 90 | Performed by: FAMILY MEDICINE

## 2017-10-03 PROCEDURE — 99214 OFFICE O/P EST MOD 30 MIN: CPT | Mod: 25 | Performed by: FAMILY MEDICINE

## 2017-10-03 PROCEDURE — 36415 COLL VENOUS BLD VENIPUNCTURE: CPT | Performed by: FAMILY MEDICINE

## 2017-10-03 PROCEDURE — 85025 COMPLETE CBC W/AUTO DIFF WBC: CPT | Performed by: FAMILY MEDICINE

## 2017-10-03 PROCEDURE — 80061 LIPID PANEL: CPT | Mod: 90 | Performed by: FAMILY MEDICINE

## 2017-10-03 RX ORDER — HYDROCHLOROTHIAZIDE 12.5 MG/1
12.5 TABLET ORAL DAILY
Qty: 90 TABLET | Refills: 1 | Status: SHIPPED | OUTPATIENT
Start: 2017-10-03 | End: 2018-03-14

## 2017-10-03 NOTE — MR AVS SNAPSHOT
After Visit Summary   10/3/2017    Tina Powell    MRN: [id number]           Patient Information     Date Of Birth          10/7/1932        Visit Information        Provider Department      10/3/2017 9:45 AM Dian Tian MD [facility]        Today's Diagnoses     Need for prophylactic vaccination and inoculation against influenza    -  1    Vitamin B12 deficiency (non anemic)        Screening examination for pulmonary tuberculosis        Hypercholesteremia        Benign essential hypertension        Vitamin D deficiency        Current smoker        Encounter for screening mammogram for breast cancer        Osteopenia of multiple sites           Follow-ups after your visit        Future tests that were ordered for you today     Open Future Orders        Priority Expected Expires Ordered    DEXA - Hip/Pelvis/Spine (FUTURE/[facility]) Routine  10/3/2018 10/3/2017    MAMMO -  Screening Digital Bilateral ([facility]UTURE/[facility]) Routine  10/3/2018 10/3/2017            Who to contact     If you have questions or need follow up information about today's clinic visit or your schedule please contact [facility] directly at [phone].  Normal or non-critical lab and imaging results will be communicated to you by Playroomhart, letter or phone within 4 business days after the clinic has received the results. If you do not hear from us within 7 days, please contact the clinic through [employer] or phone. If you have a critical or abnormal lab result, we will notify you by phone as soon as possible.  Submit refill requests through [employer] or call your pharmacy and they will forward the refill request to us. Please allow 3 business days for your refill to be completed.          Additional Information About Your Visit        Playroomhart Information     [employer] lets you send messages to your doctor, view your test results, renew your prescriptions, schedule appointments and more. To  "sign up, go to [email or web address] . Click on \"Log in\" on the left side of the screen, which will take you to the Welcome page. Then click on \"Sign up Now\" on the right side of the page.     You will be asked to enter the access code listed below, as well as some personal information. Please follow the directions to create your username and password.     Your access code is: [email or web address]  Expires: 10/26/2017  4:31 PM     Your access code will  in 90 days. If you need help or a new code, please call your [place] clinic or [phone].        Care EveryWhere ID     This is your Care EveryWhere ID. This could be used by other organizations to access your [facility] medical records  [id number]        Your Vitals Were     Pulse Pulse Oximetry BMI (Body Mass Index)             74 96% 26.63 kg/m2          Blood Pressure from Last 3 Encounters:   10/03/17 158/90   17 128/82   17 130/70    Weight from Last 3 Encounters:   10/03/17 72.6 kg (160 lb)   17 72.1 kg (159 lb)   17 72.1 kg (159 lb)              We Performed the Following     CBC with Diff/Plt ([facility])     Comp. Metabolic Panel (14) ([facility])     FLU VACCINE, INCREASED ANTIGEN, PRESV FREE, AGE 65+ [52240]     Lipid Panel ([facility])     TB INTRADERMAL TEST     Vitamin B12 ([facility])     VITAMIN B12 INJ /1000MCG  (** ADD QUANTITY **)     Vitamin D  25-Hydroxy ([facility])          Today's Medication Changes          These changes are accurate as of: 10/3/17  1:35 PM.  If you have any questions, ask your nurse or doctor.               Start taking these medicines.        Dose/Directions    hydrochlorothiazide 12.5 MG Tabs tablet   Used for:  Benign essential hypertension   Started by:  Dian Tian MD        Dose:  12.5 mg   Take 1 tablet (12.5 mg) by mouth daily   Quantity:  90 tablet   Refills:  1            Where to get your medicines      These medications were sent to [facility]/pharmacy #[id number] [facility] [address]  " [address], [place] [address]     Phone:  [phone]     hydrochlorothiazide 12.5 MG Tabs tablet                Primary Care Provider Office Phone # Fax #    Dian Janette Tian [phone]       [facility] [address]  [facility] [address]        Equal Access to Services     [facility]: Hadii aad ku hadasho Soomaali, waaxda luqadaha, qaybta kaalmada adeegyada, waxay idiin hayaan adeeg kharash la'aan ah. So [facility] [phone].    ATENCIÓN: Si habla español, tiene a marquez disposición servicios gratuitos de asistencia lingüística. Morris al [phone].    We comply with applicable federal civil rights laws and [place] laws. We do not discriminate on the basis of race, color, national origin, age, disability, sex, sexual orientation, or gender identity.            Thank you!     Thank you for choosing [facility]  for your care. Our goal is always to provide you with excellent care. Hearing back from our patients is one way we can continue to improve our services. Please take a few minutes to complete the written survey that you may receive in the mail after your visit with us. Thank you!             Your Updated Medication List - Protect others around you: Learn how to safely use, store and throw away your medicines at www.disposemymeds.org.          This list is accurate as of: 10/3/17  1:35 PM.  Always use your most recent med list.                   Brand Name Dispense Instructions for use Diagnosis    acetaminophen 325 MG tablet    TYLENOL    20 tablet    Take 1 tablet (325 mg) by mouth every 6 hours as needed for mild pain    Sepsis (H)       ALPRAZolam 0.5 MG tablet    XANAX    20 tablet    Take 0.5 mg by mouth as needed for anxiety    Anxiety       clobetasol 0.05 % ointment    TEMOVATE     Apply 1 Application topically Applies 2-3 times a week        clopidogrel 75 MG tablet    PLAVIX    90 tablet    Take 1 tablet (75 mg) by mouth daily     Encounter for medication refill       Co Q 10 100 MG Caps      Take 200 mg by mouth daily        cyanocobalamin 1000 MCG/ML injection    VITAMIN B12     Inject 1 mL into the muscle every 30 days        gemfibrozil 600 MG tablet    LOPID    60 tablet    Take 1 tablet (600 mg) by mouth 2 times daily    Elevated triglycerides with high cholesterol       HUMIRA 20 MG/0.4ML Kit   Generic drug:  adalimumab      Inject Subcutaneous every 14 days    Psoriasis       hydrochlorothiazide 12.5 MG Tabs tablet     90 tablet    Take 1 tablet (12.5 mg) by mouth daily    Benign essential hypertension       Magnesium 400 MG Caps      Take 1 tablet by mouth daily    Major depressive disorder, recurrent episode, moderate (H)       MELATONIN PO      Take 3 mg by mouth nightly as needed        omega 3 1000 MG Caps     90 capsule    Take 3 grams daily    Mixed hyperlipidemia       omeprazole 20 MG CR capsule    priLOSEC    30 capsule    Take 1 capsule (20 mg) by mouth daily    Chronic cough, Dyspepsia       pyridoxine 50 MG Tabs    VITAMIN B-6     Take 100 mg by mouth daily    Major depressive disorder, recurrent episode, moderate (H)       * venlafaxine 75 MG 24 hr capsule    EFFEXOR-XR    90 capsule    Take 1 capsule by mouth daily    Major depressive disorder, recurrent episode, moderate (H)       * venlafaxine 37.5 MG 24 hr capsule    EFFEXOR-XR    90 capsule    Take 1 capsule daily along with your 75mg capsule for total dose of 112.5mg qd.    Major depressive disorder, recurrent episode, moderate (H)       vitamin D 2000 UNITS tablet     90 tablet    Take 2,000 Units by mouth daily.    Vitamin deficiency       * Notice:  This list has 2 medication(s) that are the same as other medications prescribed for you. Read the directions carefully, and ask your doctor or other care provider to review them with you.
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
## 2017-10-03 NOTE — NURSING NOTE
The following medication was given:     MEDICATION: Vitamin B12  1000mcg  ROUTE: IM  SITE: Deltoid - Right  DOSE: 1 mL  LOT #: [id number]  :  [employer] [place]  EXPIRATION DATE:  03/2018  NDC#: 1337-9197-51  HOUSTON DUEÑAS MA

## 2017-10-04 LAB
ALBUMIN SERPL-MCNC: 3.8 G/DL (ref 3.5–4.7)
ALBUMIN/GLOB SERPL: 1.2 {RATIO} (ref 1.2–2.2)
ALP SERPL-CCNC: 73 IU/L (ref 39–117)
ALT SERPL-CCNC: 18 IU/L (ref 0–32)
AST SERPL-CCNC: 29 IU/L (ref 0–40)
BILIRUB SERPL-MCNC: 0.5 MG/DL (ref 0–1.2)
BUN SERPL-MCNC: 12 MG/DL (ref 8–27)
BUN/CREATININE RATIO: 14 (ref 12–28)
CALCIUM SERPL-MCNC: 9.4 MG/DL (ref 8.7–10.3)
CHLORIDE SERPLBLD-SCNC: 100 MMOL/L (ref 96–106)
CHOLEST SERPL-MCNC: 230 MG/DL (ref 100–199)
CREAT SERPL-MCNC: 0.84 MG/DL (ref 0.57–1)
EGFR IF AFRICN AM: 74 ML/MIN/1.73
EGFR IF NONAFRICN AM: 64 ML/MIN/1.73
GLOBULIN, TOTAL: 3.1 G/DL (ref 1.5–4.5)
GLUCOSE SERPL-MCNC: 101 MG/DL (ref 65–99)
HDLC SERPL-MCNC: 34 MG/DL
LDL/HDL RATIO: 4.4 RATIO UNITS (ref 0–3.2)
LDLC SERPL CALC-MCNC: 148 MG/DL (ref 0–99)
POTASSIUM SERPL-SCNC: 4.5 MMOL/L (ref 3.5–5.2)
PROT SERPL-MCNC: 6.9 G/DL (ref 6–8.5)
SODIUM SERPL-SCNC: 138 MMOL/L (ref 134–144)
TOTAL CO2: 24 MMOL/L (ref 18–28)
TRIGL SERPL-MCNC: 240 MG/DL (ref 0–149)
VIT B12 SERPL-MCNC: 262 PG/ML (ref 211–946)
VITAMIN D, 25-HYDROXY: 33.8 NG/ML (ref 30–100)
VLDLC SERPL CALC-MCNC: 48 MG/DL (ref 5–40)

## 2017-10-09 ENCOUNTER — TELEPHONE (OUTPATIENT)
Dept: FAMILY MEDICINE | Facility: CLINIC | Age: 82
End: 2017-10-09

## 2017-10-09 DIAGNOSIS — E78.00 HYPERCHOLESTEREMIA: ICD-10-CM

## 2017-10-09 DIAGNOSIS — R79.89 LOW VITAMIN D LEVEL: Primary | ICD-10-CM

## 2017-10-09 RX ORDER — FENOFIBRATE 48 MG/1
48 TABLET, COATED ORAL DAILY
Qty: 90 TABLET | Refills: 1 | Status: SHIPPED | OUTPATIENT
Start: 2017-10-09 | End: 2018-02-01

## 2017-10-09 NOTE — TELEPHONE ENCOUNTER
Called patient with lab results.  Informed her of low Vit D and that she should increase her Vit D 3 by 1000 iu.  Patient now takes 2000 iu daily and will increase to total of 3,000 iu.  Cholesterol elevated and patient will start Fenofibrate.  Called in for 50 mg but will notify pharmacy that if insurance will pay can change to 48 or 54 mg.   If patient tolerates this she will call back and we will change to 145 or 160 mg.  Patient will liv lipids in 6-8 weeks.  Sent over rx to [facility] pharmacy. Patient will continue getting her B 12 injections monthly.

## 2017-10-25 ENCOUNTER — HOSPITAL ENCOUNTER (OUTPATIENT)
Dept: MAMMOGRAPHY | Facility: CLINIC | Age: 82
Discharge: HOME | End: 2017-10-25
Attending: FAMILY MEDICINE | Admitting: FAMILY MEDICINE
Payer: MEDICARE

## 2017-10-25 DIAGNOSIS — Z12.31 ENCOUNTER FOR SCREENING MAMMOGRAM FOR BREAST CANCER: ICD-10-CM

## 2017-10-25 PROCEDURE — G0202 SCR MAMMO BI INCL CAD: HCPCS

## 2017-11-06 DIAGNOSIS — E53.8 VITAMIN B12 DEFICIENCY (NON ANEMIC): ICD-10-CM

## 2017-11-06 PROCEDURE — 96372 THER/PROPH/DIAG INJ SC/IM: CPT | Performed by: FAMILY MEDICINE

## 2017-11-06 NOTE — NURSING NOTE
>> JOZEF RIOS CHI   Mon Nov 6, 2017 11:50 AM  The following medication was given:     MEDICATION: Vitamin B12 1000mcg  ROUTE: IM  SITE: Deltoid - Left  DOSE: 1 mL  LOT #: [id number]  :  [employer] [place]  EXPIRATION DATE:  03/18  NDC#: 3873-7561-08  Jozef Rios Chi, RT and lab

## 2017-11-09 ENCOUNTER — TELEPHONE (OUTPATIENT)
Dept: FAMILY MEDICINE | Facility: CLINIC | Age: 82
End: 2017-11-09

## 2017-11-09 DIAGNOSIS — Z76.0 ENCOUNTER FOR MEDICATION REFILL: ICD-10-CM

## 2017-11-09 DIAGNOSIS — I63.9 CEREBRAL INFARCTION (H): Primary | ICD-10-CM

## 2017-11-09 RX ORDER — CLOPIDOGREL BISULFATE 75 MG/1
75 TABLET ORAL DAILY
Qty: 90 TABLET | Refills: 3 | Status: SHIPPED | OUTPATIENT
Start: 2017-11-09

## 2017-11-09 NOTE — TELEPHONE ENCOUNTER
Patient called requesting a refill of Plavix-per Dr. Tian prescription sent to pharmacy.  Ruba Stokes

## 2017-11-15 DIAGNOSIS — R10.13 DYSPEPSIA: ICD-10-CM

## 2017-11-15 DIAGNOSIS — R05.3 CHRONIC COUGH: ICD-10-CM

## 2017-12-06 DIAGNOSIS — F33.1 MAJOR DEPRESSIVE DISORDER, RECURRENT EPISODE, MODERATE (H): ICD-10-CM

## 2017-12-10 RX ORDER — VENLAFAXINE HYDROCHLORIDE 37.5 MG/1
CAPSULE, EXTENDED RELEASE ORAL
Qty: 30 CAPSULE | Refills: 0 | Status: SHIPPED | OUTPATIENT
Start: 2017-12-10 | End: 2017-12-14

## 2017-12-14 ENCOUNTER — OFFICE VISIT (OUTPATIENT)
Dept: FAMILY MEDICINE | Facility: CLINIC | Age: 82
End: 2017-12-14

## 2017-12-14 VITALS
WEIGHT: 160 LBS | DIASTOLIC BLOOD PRESSURE: 84 MMHG | BODY MASS INDEX: 27.31 KG/M2 | SYSTOLIC BLOOD PRESSURE: 128 MMHG | HEART RATE: 76 BPM | HEIGHT: 64 IN

## 2017-12-14 DIAGNOSIS — E55.9 VITAMIN D DEFICIENCY: ICD-10-CM

## 2017-12-14 DIAGNOSIS — F33.1 MAJOR DEPRESSIVE DISORDER, RECURRENT EPISODE, MODERATE (H): Primary | ICD-10-CM

## 2017-12-14 DIAGNOSIS — E78.00 HYPERCHOLESTEREMIA: ICD-10-CM

## 2017-12-14 DIAGNOSIS — E53.8 VITAMIN B12 DEFICIENCY (NON ANEMIC): ICD-10-CM

## 2017-12-14 DIAGNOSIS — R53.1 DECREASED STRENGTH: ICD-10-CM

## 2017-12-14 PROCEDURE — 96372 THER/PROPH/DIAG INJ SC/IM: CPT | Performed by: FAMILY MEDICINE

## 2017-12-14 PROCEDURE — 36415 COLL VENOUS BLD VENIPUNCTURE: CPT | Performed by: FAMILY MEDICINE

## 2017-12-14 PROCEDURE — 80061 LIPID PANEL: CPT | Mod: 90 | Performed by: FAMILY MEDICINE

## 2017-12-14 PROCEDURE — 99214 OFFICE O/P EST MOD 30 MIN: CPT | Mod: 25 | Performed by: FAMILY MEDICINE

## 2017-12-14 PROCEDURE — 82306 VITAMIN D 25 HYDROXY: CPT | Mod: 90 | Performed by: FAMILY MEDICINE

## 2017-12-14 RX ORDER — VENLAFAXINE HYDROCHLORIDE 37.5 MG/1
CAPSULE, EXTENDED RELEASE ORAL
Qty: 90 CAPSULE | Refills: 1 | Status: SHIPPED | OUTPATIENT
Start: 2017-12-14 | End: 2018-06-07 | Stop reason: DRUGHIGH

## 2017-12-14 ASSESSMENT — PATIENT HEALTH QUESTIONNAIRE - PHQ9: SUM OF ALL RESPONSES TO PHQ QUESTIONS 1-9: 8

## 2017-12-14 NOTE — PROGRESS NOTES
"Problem(s) Oriented visit        SUBJECTIVE:                                                    Tina Powell is a 85 year old female who presents to clinic today for recheck of moods. She is taking venlafaxine. She is currently taking 112.5 mg. She tolerates this well. Her sleep is fine. She uses a spray that helps her fall asleep and stay asleep. The spray is made by \"[employer]\" and is sold by her daughter. Apparently it contains melatonin.     She continues to drive.     She is past due for her B12 injection.     She complains of generalized weakness that makes her unable to put her fitted sheet on her bed. She tires out quickly when she walks.  Problem list, Medication list, Allergies, and Medical/Social/Surgical histories reviewed in EPIC and updated as appropriate.   Additional history: as documented    ROS:  5 point ROS completed and negative except noted above, including Gen, CV, Resp, GI, MS      Histories:   Patient Active Problem List   Diagnosis     Psoriasis     Dyslipidemia     Health Care Home     Advance Care Planning     H/O: CVA (cerebrovascular accident)     Cerebral infarction (H)     Risk for falls     Sepsis (H)     Major depressive disorder, recurrent episode, moderate (H)     Alcoholism (H)     Tobacco abuse     Past Surgical History:   Procedure Laterality Date     HYSTERECTOMY  1950    fibroids       Social History   Substance Use Topics     Smoking status: Current Some Day Smoker     Smokeless tobacco: Never Used      Comment: Social, states she does not smoke daily, about 12 cigs per week     Alcohol use 1.8 - 2.4 oz/week     1 - 2 Glasses of wine, 2 Standard drinks or equivalent per week     Family History   Problem Relation Age of Onset     HEART DISEASE Mother      CEREBROVASCULAR DISEASE Father            OBJECTIVE:                                                    /84  Pulse 76  Ht 1.626 m (5' 4\")  Wt 72.6 kg (160 lb)  Breastfeeding? No  BMI 27.46 kg/m2  Body mass " index is 27.46 kg/(m^2).   GENERAL APPEARANCE: Alert, no acute distress  NECK: No adenopathy,masses or thyromegaly  RESP: lungs clear to auscultation   CV: normal rate, regular rhythm, no murmur or gallop  MS: extremities normal, no peripheral edema  Strength of the UE is 5-/5 and equal bilaterally both proximally and distally with the exception of the deltoids which I would rate 4/5 and equal bilaterally.  NEURO: Alert, oriented, speech and mentation normal  PSYCH: mentation appears normal, affect and mood normal   Labs Resulted Today:   Results for orders placed or performed in visit on 12/14/17   Lipid Panel ([facility])   Result Value Ref Range    Cholesterol 251 (H) 100 - 199 mg/dL    Triglycerides 277 (H) 0 - 149 mg/dL    HDL Cholesterol 32 (L) >39 mg/dL    VLDL Cholesterol Renny 55 (H) 5 - 40 mg/dL    LDL Cholesterol Calculated 164 (H) 0 - 99 mg/dL    LDL/HDL Ratio 5.1 (H) 0.0 - 3.2 ratio units    Narrative    Performed at:  01 - [facility] [address], [place], [place]  [id number]  : Noe Madrigal MD, Phone:  [phone]   Vitamin D  25-Hydroxy ([facility])   Result Value Ref Range    Vitamin D,25-Hydroxy 41.9 30.0 - 100.0 ng/mL    Narrative    Performed at:  01 - [facility] [address], [place], [place]  [id number]  : Noe Madrigal MD, Phone:  [phone]     ASSESSMENT/PLAN:                                                        Tina was seen today for recheck medication and imm/inj.    Diagnoses and all orders for this visit:    Major depressive disorder, recurrent episode, moderate (H)  -     venlafaxine (EFFEXOR-XR) 37.5 MG 24 hr capsule; Take 1 capsule daily along with your 75mg capsule for total dose of 112.5mg qd.  -     VITAMIN B12 INJ /1000MCG  (** ADD QUANTITY **); Standing  -     VITAMIN B12 INJ /1000MCG  (** ADD QUANTITY **)    Hypercholesteremia  -     Lipid Panel ([facility])  Currently on fenofibrate and gemfibrozil. Intolerant of statins.    Vitamin B12 deficiency  (non anemic)  -     VITAMIN B12 INJ /1000MCG  (1 ml); Standing  Injection is given today and I recommend that she return every other week for 4 doses, then recheck Vitamin B12 level.    Vitamin D deficiency  -     Vitamin D  25-Hydroxy ([facility])  She is encouraged to take vitamin D3 supplement daily year round, currently on 3,000 IU daily, verify that dose is correct.    Decreased strength  I think this feels like age related atrophy and recommend referral to PT if she desires.    There are no Patient Instructions on file for this visit.    The following health maintenance items are reviewed in Epic and correct as of today:  Health Maintenance   Topic Date Due     FALL RISK ASSESSMENT  01/03/2018     PHQ-9 Q6 MONTHS  06/14/2018     DEPRESSION ACTION PLAN Q1 YR  07/26/2018     TETANUS IMMUNIZATION (SYSTEM ASSIGNED)  07/27/2020     ADVANCE DIRECTIVE PLANNING Q5 YRS  11/10/2020     INFLUENZA VACCINE (SYSTEM ASSIGNED)  Completed     PNEUMOCOCCAL  Completed       Dian Tian MD  [facility]  Family Practice  [facility]  [phone]    For any issues my office # is [phone]

## 2017-12-14 NOTE — NURSING NOTE
Per verbal order from Dian Tian patient with need to have B-12 every other week for one month then monthly injections. Standing orders placed.     Saroj Crocker LPN 10/18/2017 11:56 AM      The following medication was given:     MEDICATION: Vitamin B12  1000mcg  ROUTE: IM  SITE: Deltoid - Left  DOSE: 1000 mcg/1ml  LOT #: [id number]  :  [employer] [place]  EXPIRATION DATE:  03/31/18    Saroj Crocker LPN 10/18/2017 11:56 AM

## 2017-12-14 NOTE — LETTER
[facility] [address] [place] [address] [phone]      December 19, 2017      Tina Powell  [address]  [place] [address]              Dear Tina,    Noel      Latest Ref Rng & Units 12/14/2017   Cholesterol      100 - 199 mg/dL 251 (H)   Triglycerides      0 - 149 mg/dL 277 (H)   HDL Cholesterol      >39 mg/dL 32 (L)   VLDL Cholesterol Renny      5 - 40 mg/dL 55 (H)   LDL Cholesterol Calculated      0 - 99 mg/dL 164 (H)   LDL/HDL Ratio      0.0 - 3.2 ratio units 5.1 (H)   Vitamin D,25-Hydroxy      30.0 - 100.0 ng/mL 41.9     NOTES FROM PROVIDER:  Your cholesterol is not good.  Since you did not tolerate statins, and are already on fenofibrate and gemfibrozil, the only thing I can recommend is working on your diet. If you are not able to get improvement with diet, we could see if your insurance would cover Repatha, a new injected cholesterol medication. It is given every two weeks. Your cholesterol is high enough that it increases your risk of heart disease by 2 times the average risk.     Sincerely,    Dian Tian M.D.

## 2017-12-14 NOTE — MR AVS SNAPSHOT
"              After Visit Summary   2017    Tina Powell    MRN: [id number]           Patient Information     Date Of Birth          10/7/1932        Visit Information        Provider Department      2017 12:30 PM Dian Tian MD [facility]        Today's Diagnoses     Major depressive disorder, recurrent episode, moderate (H)    -  1    Hypercholesteremia        Vitamin B12 deficiency (non anemic)        Vitamin D deficiency        Decreased strength           Follow-ups after your visit        Who to contact     If you have questions or need follow up information about today's clinic visit or your schedule please contact [facility] directly at [phone].  Normal or non-critical lab and imaging results will be communicated to you by MyChart, letter or phone within 4 business days after the clinic has received the results. If you do not hear from us within 7 days, please contact the clinic through [place] [employer] or phone. If you have a critical or abnormal lab result, we will notify you by phone as soon as possible.  Submit refill requests through [employer] or call your pharmacy and they will forward the refill request to us. Please allow 3 business days for your refill to be completed.          Additional Information About Your Visit        MyChart Information     [employer] lets you send messages to your doctor, view your test results, renew your prescriptions, schedule appointments and more. To sign up, go to [email or web address] . Click on \"Log in\" on the left side of the screen, which will take you to the Welcome page. Then click on \"Sign up Now\" on the right side of the page.     You will be asked to enter the access code listed below, as well as some personal information. Please follow the directions to create your username and password.     Your access code is: [email or web address]  Expires: 3/18/2018 10:20 AM     Your access code will  in 90 days. If you need " "help or a new code, please call your [place] clinic or [phone].        Care EveryWhere ID     This is your Care EveryWhere ID. This could be used by other organizations to access your [place] medical records  [id number]        Your Vitals Were     Pulse Height Breastfeeding? BMI (Body Mass Index)          76 1.626 m (5' 4\") No 27.46 kg/m2         Blood Pressure from Last 3 Encounters:   12/14/17 128/84   10/03/17 158/90   08/04/17 128/82    Weight from Last 3 Encounters:   12/14/17 72.6 kg (160 lb)   10/03/17 72.6 kg (160 lb)   08/04/17 72.1 kg (159 lb)              We Performed the Following     Lipid Panel ([facility])     Vitamin D  25-Hydroxy ([facility])          Today's Medication Changes          These changes are accurate as of: 12/14/17 11:59 PM.  If you have any questions, ask your nurse or doctor.               Stop taking these medicines if you haven't already. Please contact your care team if you have questions.     MELATONIN PO   Stopped by:  Dian Tian MD                Where to get your medicines      These medications were sent to [facility]/pharmacy #[id number] [place], [place] - [address], [place] [address]     Phone:  [phone]     venlafaxine 37.5 MG 24 hr capsule                Primary Care Provider Office Phone # Fax #    Dian Tian [phone]       [facility] [address] [place] [address]        Equal Access to Services     [facility]: Hadii burak paitning hadasho Soomaali, waaxda luqadaha, qaybta kaalmada cele soares . So [facility] [phone].    ATENCIÓN: Si habla español, tiene a marquez disposición servicios gratuitos de asistencia lingüística. Llame al [phone].    We comply with applicable federal civil rights laws and [place] laws. We do not discriminate on the basis of race, color, national origin, age, disability, sex, sexual orientation, or gender " identity.            Thank you!     Thank you for choosing [facility]  for your care. Our goal is always to provide you with excellent care. Hearing back from our patients is one way we can continue to improve our services. Please take a few minutes to complete the written survey that you may receive in the mail after your visit with us. Thank you!             Your Updated Medication List - Protect others around you: Learn how to safely use, store and throw away your medicines at www.disposemymeds.org.          This list is accurate as of: 12/14/17 11:59 PM.  Always use your most recent med list.                   Brand Name Dispense Instructions for use Diagnosis    acetaminophen 325 MG tablet    TYLENOL    20 tablet    Take 1 tablet (325 mg) by mouth every 6 hours as needed for mild pain    Sepsis (H)       ALPRAZolam 0.5 MG tablet    XANAX    20 tablet    Take 0.5 mg by mouth as needed for anxiety    Anxiety       cholecalciferol 1000 UNIT tablet    vitamin D3    100 tablet    Take  3 tablets daily.    Low vitamin D level       clobetasol 0.05 % ointment    TEMOVATE     Apply 1 Application topically Applies 2-3 times a week        clopidogrel 75 MG tablet    PLAVIX    90 tablet    Take 1 tablet (75 mg) by mouth daily    Encounter for medication refill       Co Q 10 100 MG Caps      Take 200 mg by mouth daily        cyanocobalamin 1000 MCG/ML injection    VITAMIN B12     Inject 1 mL into the muscle every 30 days        fenofibrate 48 MG tablet     90 tablet    Take 1 tablet (48 mg) by mouth daily    Hypercholesteremia       gemfibrozil 600 MG tablet    LOPID    60 tablet    Take 1 tablet (600 mg) by mouth 2 times daily    Elevated triglycerides with high cholesterol       HUMIRA 20 MG/0.4ML Kit   Generic drug:  adalimumab      Inject Subcutaneous every 14 days    Psoriasis       hydrochlorothiazide 12.5 MG Tabs tablet     90 tablet    Take 1 tablet (12.5 mg) by mouth daily    Benign essential  hypertension       Magnesium 400 MG Caps      Take 1 tablet by mouth daily    Major depressive disorder, recurrent episode, moderate (H)       omega 3 1000 MG Caps     90 capsule    Take 3 grams daily    Mixed hyperlipidemia       omeprazole 20 MG CR capsule    priLOSEC    30 capsule    Take 1 capsule (20 mg) by mouth daily    Chronic cough, Dyspepsia       pyridoxine 50 MG Tabs    VITAMIN B-6     Take 100 mg by mouth daily    Major depressive disorder, recurrent episode, moderate (H)       * venlafaxine 75 MG 24 hr capsule    EFFEXOR-XR    90 capsule    Take 1 capsule by mouth daily    Major depressive disorder, recurrent episode, moderate (H)       * venlafaxine 37.5 MG 24 hr capsule    EFFEXOR-XR    90 capsule    Take 1 capsule daily along with your 75mg capsule for total dose of 112.5mg qd.    Major depressive disorder, recurrent episode, moderate (H)       * Notice:  This list has 2 medication(s) that are the same as other medications prescribed for you. Read the directions carefully, and ask your doctor or other care provider to review them with you.

## 2017-12-15 LAB
CHOLEST SERPL-MCNC: 251 MG/DL (ref 100–199)
HDLC SERPL-MCNC: 32 MG/DL
LDL/HDL RATIO: 5.1 RATIO UNITS (ref 0–3.2)
LDLC SERPL CALC-MCNC: 164 MG/DL (ref 0–99)
TRIGL SERPL-MCNC: 277 MG/DL (ref 0–149)
VITAMIN D, 25-HYDROXY: 41.9 NG/ML (ref 30–100)
VLDLC SERPL CALC-MCNC: 55 MG/DL (ref 5–40)

## 2017-12-21 ENCOUNTER — MEDICAL CORRESPONDENCE (OUTPATIENT)
Dept: FAMILY MEDICINE | Facility: CLINIC | Age: 82
End: 2017-12-21

## 2018-01-17 DIAGNOSIS — E53.8 VITAMIN B12 DEFICIENCY (NON ANEMIC): ICD-10-CM

## 2018-01-17 PROCEDURE — 96372 THER/PROPH/DIAG INJ SC/IM: CPT | Performed by: FAMILY MEDICINE

## 2018-01-17 NOTE — NURSING NOTE
The following medication was given:     MEDICATION: Vitamin B12  1000mcg  ROUTE: IM  SITE: Deltoid - Right  DOSE: 1mL  LOT #: [id number]  :  [employer] [place]  EXPIRATION DATE:  03/2018  NDC#: 9006-6128-23

## 2018-01-29 DIAGNOSIS — E53.8 VITAMIN B12 DEFICIENCY (NON ANEMIC): Primary | ICD-10-CM

## 2018-01-29 PROCEDURE — 96372 THER/PROPH/DIAG INJ SC/IM: CPT | Performed by: FAMILY MEDICINE

## 2018-01-29 NOTE — NURSING NOTE
The following medication was given:     MEDICATION: Vitamin B12  1000mcg  ROUTE: IM  SITE: Deltoid - Left  DOSE: 1ml  LOT #: [id number]  :  [employer] [place]  EXPIRATION DATE:  03/2018  NDC#: 1070-4701-89   HERLINDA BUSH CMA

## 2018-02-01 ENCOUNTER — TELEPHONE (OUTPATIENT)
Dept: FAMILY MEDICINE | Facility: CLINIC | Age: 83
End: 2018-02-01

## 2018-02-01 DIAGNOSIS — E78.00 HYPERCHOLESTEREMIA: ICD-10-CM

## 2018-02-01 RX ORDER — FENOFIBRATE 48 MG/1
48 TABLET, COATED ORAL DAILY
Qty: 90 TABLET | Refills: 0 | Status: SHIPPED | OUTPATIENT
Start: 2018-02-01

## 2018-02-01 NOTE — TELEPHONE ENCOUNTER
Spoke with patient regarding recent lipid results.  Patient reports that she has not been taking fenofibrate.  Per Dr. Tian patient should restart this medication-prescription sent to pharmacy.  Patient should RTC to have recheck fasting lipids 6-8 weeks. Patient agrees and future lab ordered.  Ruba Stokes

## 2018-02-14 DIAGNOSIS — R05.3 CHRONIC COUGH: ICD-10-CM

## 2018-02-14 DIAGNOSIS — R10.13 DYSPEPSIA: ICD-10-CM

## 2018-02-22 DIAGNOSIS — F33.1 MAJOR DEPRESSIVE DISORDER, RECURRENT EPISODE, MODERATE (H): ICD-10-CM

## 2018-02-22 RX ORDER — VENLAFAXINE HYDROCHLORIDE 75 MG/1
CAPSULE, EXTENDED RELEASE ORAL
Qty: 90 CAPSULE | Refills: 3 | Status: SHIPPED | OUTPATIENT
Start: 2018-02-22 | End: 2018-06-07

## 2018-03-14 DIAGNOSIS — I10 BENIGN ESSENTIAL HYPERTENSION: ICD-10-CM

## 2018-03-14 RX ORDER — HYDROCHLOROTHIAZIDE 12.5 MG/1
12.5 TABLET ORAL DAILY
Qty: 90 TABLET | Refills: 2 | Status: ON HOLD | OUTPATIENT
Start: 2018-03-14 | End: 2018-06-13

## 2018-05-03 DIAGNOSIS — E53.8 VITAMIN B12 DEFICIENCY (NON ANEMIC): ICD-10-CM

## 2018-05-03 PROCEDURE — 96372 THER/PROPH/DIAG INJ SC/IM: CPT | Performed by: FAMILY MEDICINE

## 2018-05-03 NOTE — NURSING NOTE
The following medication was given:     MEDICATION: Vitamin B12  1000mcg  ROUTE: IM  SITE: Deltoid - Left  DOSE: 1000mcg  LOT #: [id number]  :  [employer] [place]  EXPIRATION DATE:  [id number]  NDC#: 0302-5380-41.    Salina Rose MA  5/3/2018     2:57 PM

## 2018-06-07 ENCOUNTER — PATIENT OUTREACH (OUTPATIENT)
Dept: CARE COORDINATION | Facility: CLINIC | Age: 83
End: 2018-06-07

## 2018-06-07 ENCOUNTER — OFFICE VISIT (OUTPATIENT)
Dept: FAMILY MEDICINE | Facility: CLINIC | Age: 83
End: 2018-06-07

## 2018-06-07 VITALS
RESPIRATION RATE: 16 BRPM | DIASTOLIC BLOOD PRESSURE: 72 MMHG | SYSTOLIC BLOOD PRESSURE: 106 MMHG | BODY MASS INDEX: 29.3 KG/M2 | HEART RATE: 84 BPM | HEIGHT: 63 IN | WEIGHT: 165.4 LBS

## 2018-06-07 DIAGNOSIS — R30.0 DYSURIA: Primary | ICD-10-CM

## 2018-06-07 DIAGNOSIS — F33.1 MAJOR DEPRESSIVE DISORDER, RECURRENT EPISODE, MODERATE (H): ICD-10-CM

## 2018-06-07 DIAGNOSIS — E53.8 VITAMIN B12 DEFICIENCY (NON ANEMIC): ICD-10-CM

## 2018-06-07 DIAGNOSIS — R53.82 CHRONIC FATIGUE: ICD-10-CM

## 2018-06-07 LAB
BACTERIA URINE: ABNORMAL
BILIRUB UR QL STRIP: ABNORMAL
BLOOD URINE DIP: ABNORMAL
CASTS/LPF: 0
COLOR UR: YELLOW
CRYSTAL URINE: 0
EPITHELIAL CELLS - QUEST: ABNORMAL
GLUCOSE UR STRIP-MCNC: 0 MG/DL
KETONES UR QL STRIP: ABNORMAL
LEUKOCYTE ESTERASE URINE DIP: ABNORMAL
MUCOUS URINE: 0
NITRITE UR QL STRIP: ABNORMAL
PH UR STRIP: 5.5 PH (ref 5–9)
PROT UR QL: ABNORMAL MG/DL (ref ?–0.01)
RBC URINE: ABNORMAL (ref 0–3)
SP GR UR STRIP: 1.02 (ref 1–1.02)
UROBILINOGEN UR QL STRIP: 1 EU/DL (ref 0.2–1)
WBC URINE: ABNORMAL (ref 0–3)

## 2018-06-07 PROCEDURE — 96372 THER/PROPH/DIAG INJ SC/IM: CPT | Performed by: NURSE PRACTITIONER

## 2018-06-07 PROCEDURE — 99213 OFFICE O/P EST LOW 20 MIN: CPT | Mod: 25 | Performed by: NURSE PRACTITIONER

## 2018-06-07 PROCEDURE — 81003 URINALYSIS AUTO W/O SCOPE: CPT | Performed by: NURSE PRACTITIONER

## 2018-06-07 RX ORDER — VENLAFAXINE HYDROCHLORIDE 75 MG/1
150 CAPSULE, EXTENDED RELEASE ORAL DAILY
Qty: 90 CAPSULE | Refills: 3 | Status: SHIPPED | OUTPATIENT
Start: 2018-06-07 | End: 2018-07-12 | Stop reason: DRUGHIGH

## 2018-06-07 RX ORDER — VENLAFAXINE HYDROCHLORIDE 75 MG/1
150 CAPSULE, EXTENDED RELEASE ORAL DAILY
Qty: 90 CAPSULE | Refills: 3 | Status: SHIPPED | OUTPATIENT
Start: 2018-06-07 | End: 2018-06-07

## 2018-06-07 RX ORDER — CIPROFLOXACIN 250 MG/1
250 TABLET, FILM COATED ORAL 2 TIMES DAILY
Qty: 10 TABLET | Refills: 0 | Status: ON HOLD | OUTPATIENT
Start: 2018-06-07 | End: 2018-06-13

## 2018-06-07 ASSESSMENT — ANXIETY QUESTIONNAIRES
7. FEELING AFRAID AS IF SOMETHING AWFUL MIGHT HAPPEN: SEVERAL DAYS
GAD7 TOTAL SCORE: 7
6. BECOMING EASILY ANNOYED OR IRRITABLE: SEVERAL DAYS
1. FEELING NERVOUS, ANXIOUS, OR ON EDGE: SEVERAL DAYS
3. WORRYING TOO MUCH ABOUT DIFFERENT THINGS: SEVERAL DAYS
2. NOT BEING ABLE TO STOP OR CONTROL WORRYING: SEVERAL DAYS
IF YOU CHECKED OFF ANY PROBLEMS ON THIS QUESTIONNAIRE, HOW DIFFICULT HAVE THESE PROBLEMS MADE IT FOR YOU TO DO YOUR WORK, TAKE CARE OF THINGS AT HOME, OR GET ALONG WITH OTHER PEOPLE: SOMEWHAT DIFFICULT
5. BEING SO RESTLESS THAT IT IS HARD TO SIT STILL: SEVERAL DAYS

## 2018-06-07 ASSESSMENT — PATIENT HEALTH QUESTIONNAIRE - PHQ9: 5. POOR APPETITE OR OVEREATING: SEVERAL DAYS

## 2018-06-07 NOTE — PATIENT INSTRUCTIONS
At your visit today, we discussed your risk for falls and preventive options.    1. Dysuria  Increase water intake  - Urinalysis w/reflex protein, bili ([facility])-pos  cipro 250mg 2x daily x 5 days    2. Major depressive disorder, recurrent episode, moderate (H)  Take vitamin D 2000 daily    - venlafaxine (EFFEXOR-XR) 75 MG 24 hr capsule; Take 2 capsules (150 mg) by mouth daily.  Dispense: 90 capsule; Refill: 3, stop 37.5mg     3. Fatigue-B12 injection                Fall Prevention  Falls often occur due to slipping, tripping or losing your balance. Millions of people fall every year and injure themselves. Here are ways to reduce your risk of falling again.    Think about your fall, was there anything that caused your fall that can be fixed, removed, or replaced?    Make your home safe by keeping walkways clear of objects you may trip over.    Use non-slip pads under rugs. Do not use area rugs or small throw rugs.    Use non-slip mats in bathtubs and showers.    Install handrails and lights on staircases.    Do not walk in poorly lit areas.    Do not stand on chairs or wobbly ladders.    Use caution when reaching overhead or looking upward. This position can cause a loss of balance.    Be sure your shoes fit properly, have non-slip bottoms and are in good condition.     Wear shoes both inside and out. Avoid going barefoot or wearing slippers.    Be cautious when going up and down stairs, curbs, and when walking on uneven sidewalks.    If your balance is poor, consider using a cane or walker.    If your fall was related to alcohol use, stop or limit alcohol intake.     If your fall was related to use of sleeping medicines, talk to your doctor about this. You may need to reduce your dosage at bedtime if you awaken during the night to go to the bathroom.      To reduce the need for nighttime bathroom trips:  ? Avoid drinking fluids for several hours before going to bed  ? Empty your bladder before going to bed  ? Men can  keep a urinal at the bedside    Stay as active as you can. Balance, flexibility, strength, and endurance all come from exercise. They all play a role in preventing falls. Ask your healthcare provider which types of activity are right for you.    Get your vision checked on a regular basis.    If you have pets, know where they are before you stand up or walk so you don't trip over them.    Use night lights.  Date Last Reviewed: 11/5/2015 2000-2017 [employer]. [address] [place] [address]. All rights reserved. This information is not intended as a substitute for professional medical care. Always follow your healthcare professional's instructions.

## 2018-06-07 NOTE — MR AVS SNAPSHOT
After Visit Summary   6/7/2018    Tina Powlel    MRN: [id number]           Patient Information     Date Of Birth          10/7/1932        Visit Information        Provider Department      6/7/2018 10:00 AM Sharon Alonzo APRN CNP [facility]        Today's Diagnoses     Dysuria    -  1    Major depressive disorder, recurrent episode, moderate (H)        Chronic fatigue          Care Instructions      At your visit today, we discussed your risk for falls and preventive options.    1. Dysuria  Increase water intake  - Urinalysis w/reflex protein, bili ([facility])-pos  cipro 250mg 2x daily x 5 days    2. Major depressive disorder, recurrent episode, moderate (H)  Take vitamin D 2000 daily    - venlafaxine (EFFEXOR-XR) 75 MG 24 hr capsule; Take 2 capsules (150 mg) by mouth daily.  Dispense: 90 capsule; Refill: 3, stop 37.5mg     3. Fatigue-B12 injection                Fall Prevention  Falls often occur due to slipping, tripping or losing your balance. Millions of people fall every year and injure themselves. Here are ways to reduce your risk of falling again.    Think about your fall, was there anything that caused your fall that can be fixed, removed, or replaced?    Make your home safe by keeping walkways clear of objects you may trip over.    Use non-slip pads under rugs. Do not use area rugs or small throw rugs.    Use non-slip mats in bathtubs and showers.    Install handrails and lights on staircases.    Do not walk in poorly lit areas.    Do not stand on chairs or wobbly ladders.    Use caution when reaching overhead or looking upward. This position can cause a loss of balance.    Be sure your shoes fit properly, have non-slip bottoms and are in good condition.     Wear shoes both inside and out. Avoid going barefoot or wearing slippers.    Be cautious when going up and down stairs, curbs, and when walking on uneven sidewalks.    If your balance is poor, consider using a cane  or walker.    If your fall was related to alcohol use, stop or limit alcohol intake.     If your fall was related to use of sleeping medicines, talk to your doctor about this. You may need to reduce your dosage at bedtime if you awaken during the night to go to the bathroom.      To reduce the need for nighttime bathroom trips:  ? Avoid drinking fluids for several hours before going to bed  ? Empty your bladder before going to bed  ? Men can keep a urinal at the bedside    Stay as active as you can. Balance, flexibility, strength, and endurance all come from exercise. They all play a role in preventing falls. Ask your healthcare provider which types of activity are right for you.    Get your vision checked on a regular basis.    If you have pets, know where they are before you stand up or walk so you don't trip over them.    Use night lights.  Date Last Reviewed: 11/5/2015 2000-2017 [employer]. [address] [place] [address]. All rights reserved. This information is not intended as a substitute for professional medical care. Always follow your healthcare professional's instructions.                Follow-ups after your visit        Follow-up notes from your care team     Return if symptoms worsen or fail to improve.      Who to contact     If you have questions or need follow up information about today's clinic visit or your schedule please contact [facility] directly at [phone].  Normal or non-critical lab and imaging results will be communicated to you by MyChart, letter or phone within 4 business days after the clinic has received the results. If you do not hear from us within 7 days, please contact the clinic through MyChart or phone. If you have a critical or abnormal lab result, we will notify you by phone as soon as possible.  Submit refill requests through [employer] or call your pharmacy and they will forward the refill request to us. Please allow 3 business days  "for your refill to be completed.          Additional Information About Your Visit        Care EveryWhere ID     This is your Care EveryWhere ID. This could be used by other organizations to access your [place] medical records  [id number]        Your Vitals Were     Pulse Respirations Height BMI (Body Mass Index)          84 16 1.6 m (5' 3\") 29.3 kg/m2         Blood Pressure from Last 3 Encounters:   06/07/18 106/72   12/14/17 128/84   10/03/17 158/90    Weight from Last 3 Encounters:   06/07/18 75 kg (165 lb 6.4 oz)   12/14/17 72.6 kg (160 lb)   10/03/17 72.6 kg (160 lb)              We Performed the Following     Urinalysis w/reflex protein, bili ([facility])     Urine Culture  Routine ([facility])     VITAMIN B12 INJ /1000MCG  (** ADD QUANTITY **)          Today's Medication Changes          These changes are accurate as of 6/7/18 10:53 AM.  If you have any questions, ask your nurse or doctor.               Start taking these medicines.        Dose/Directions    ciprofloxacin 250 MG tablet   Commonly known as:  CIPRO   Used for:  Dysuria   Started by:  Sharon Alonzo APRN CNP        Dose:  250 mg   Take 1 tablet (250 mg) by mouth 2 times daily for 5 days   Quantity:  10 tablet   Refills:  0         These medicines have changed or have updated prescriptions.        Dose/Directions    venlafaxine 75 MG 24 hr capsule   Commonly known as:  EFFEXOR-XR   This may have changed:    - medication strength  - how much to take  - how to take this  - when to take this  - additional instructions   Used for:  Major depressive disorder, recurrent episode, moderate (H)   Changed by:  Sharon Alonzo APRN CNP        Dose:  150 mg   Take 2 capsules (150 mg) by mouth daily   Quantity:  90 capsule   Refills:  3            Where to get your medicines      Some of these will need a paper prescription and others can be bought over the counter.  Ask your nurse if you have questions.     Bring a paper prescription for each of these " medications     ciprofloxacin 250 MG tablet    venlafaxine 75 MG 24 hr capsule                Primary Care Provider Office Phone # Fax #    Dian Janette Tian [phone] [address] [place] [address]        Equal Access to Services     ISRAMARIZA CONRADO : Hadii aad ku hadashleyo Soomaali, waaxda luqadaha, qaybta kaalmada adeegyada, waxvince octaviain hayaan adereal mahoney laericdilia owen. So [facility] [phone].    ATENCIÓN: Si habla español, tiene a marquez disposición servicios gratuitos de asistencia lingüística. Llame al [phone].    We comply with applicable federal civil rights laws and [place] laws. We do not discriminate on the basis of race, color, national origin, age, disability, sex, sexual orientation, or gender identity.            Thank you!     Thank you for choosing [facility]  for your care. Our goal is always to provide you with excellent care. Hearing back from our patients is one way we can continue to improve our services. Please take a few minutes to complete the written survey that you may receive in the mail after your visit with us. Thank you!             Your Updated Medication List - Protect others around you: Learn how to safely use, store and throw away your medicines at www.disposemymeds.org.          This list is accurate as of 6/7/18 10:53 AM.  Always use your most recent med list.                   Brand Name Dispense Instructions for use Diagnosis    acetaminophen 325 MG tablet    TYLENOL    20 tablet    Take 1 tablet (325 mg) by mouth every 6 hours as needed for mild pain    Sepsis (H)       ALPRAZolam 0.5 MG tablet    XANAX    20 tablet    Take 0.5 mg by mouth as needed for anxiety    Anxiety       cholecalciferol 1000 UNIT tablet    vitamin D3    100 tablet    Take  3 tablets daily.    Low vitamin D level       ciprofloxacin 250 MG tablet    CIPRO    10 tablet    Take 1 tablet (250 mg) by mouth 2 times daily for 5 days    Dysuria       clobetasol  0.05 % ointment    TEMOVATE     Apply 1 Application topically Applies 2-3 times a week        clopidogrel 75 MG tablet    PLAVIX    90 tablet    Take 1 tablet (75 mg) by mouth daily    Encounter for medication refill       Co Q 10 100 MG Caps      Take 200 mg by mouth daily        cyanocobalamin 1000 MCG/ML injection    VITAMIN B12     Inject 1 mL into the muscle every 30 days        fenofibrate 48 MG tablet     90 tablet    Take 1 tablet (48 mg) by mouth daily    Hypercholesteremia       HUMIRA 20 MG/0.4ML Kit   Generic drug:  adalimumab      Inject Subcutaneous every 14 days    Psoriasis       hydrochlorothiazide 12.5 MG Tabs tablet     90 tablet    Take 1 tablet (12.5 mg) by mouth daily    Benign essential hypertension       Magnesium 400 MG Caps      Take 1 tablet by mouth daily    Major depressive disorder, recurrent episode, moderate (H)       omega 3 1000 MG Caps     90 capsule    Take 3 grams daily    Mixed hyperlipidemia       omeprazole 20 MG CR capsule    priLOSEC    30 capsule    Take 1 capsule (20 mg) by mouth daily    Chronic cough, Dyspepsia       pyridoxine 50 MG Tabs    VITAMIN B-6     Take 100 mg by mouth daily    Major depressive disorder, recurrent episode, moderate (H)       venlafaxine 75 MG 24 hr capsule    EFFEXOR-XR    90 capsule    Take 2 capsules (150 mg) by mouth daily    Major depressive disorder, recurrent episode, moderate (H)

## 2018-06-07 NOTE — PROGRESS NOTES
Problem(s) Oriented visit      SUBJECTIVE:                                                    Tina Powell is a 85 year old female who presents to clinic today with Daughter who states pt has been more confused past 2 weeks and wonders about bladder infection. Pt reports frequency.  Pt has also been tired and needs B12 injection. Pt and daughter report pt has been unmotivated and sleeping a lot and wanting increase in effexor. Reports falling 2-3 times in 1 year, on uneven ground. Discussed standing slowly and getting balance first, not having rugs or excessive things in walk ways.       Problem list, Medication list, Allergies, and Medical/Social/Surgical histories reviewed in EPIC and updated as appropriate.       ROS:  5 point ROS completed and negative except noted above, including Gen, CV, Resp, GI, MS    Histories:   Patient Active Problem List   Diagnosis     Psoriasis     Dyslipidemia     Health Care Home     Advance Care Planning     H/O: CVA (cerebrovascular accident)     Cerebral infarction (H)     Risk for falls     Sepsis (H)     Major depressive disorder, recurrent episode, moderate (H)     Alcoholism (H)     Tobacco abuse     Past Medical History:   Diagnosis Date     Depressed      Dyslipidemia      H/O: CVA (cerebrovascular accident) 2014 2013     HTN (hypertension)     resolved years ago,  on no treatment now.     Hyperlipidaemia LDL goal < 100      Psoriases      Risk for falls      Urosepsis 6-2007     Past Surgical History:   Procedure Laterality Date     HYSTERECTOMY  1950    fibroids     Social History   Substance Use Topics     Smoking status: Current Some Day Smoker     Smokeless tobacco: Never Used      Comment: Social, states she does not smoke daily, about 12 cigs per week     Alcohol use 1.8 - 2.4 oz/week     1 - 2 Glasses of wine, 2 Standard drinks or equivalent per week     Family History   Problem Relation Age of Onset     HEART DISEASE Mother      CEREBROVASCULAR DISEASE  Father        OBJECTIVE:                                                    There were no vitals taken for this visit.  There is no height or weight on file to calculate BMI.   APPEARANCE: = Relaxed and in no distress  Conj/Eyelids = noninjected and lids and lashes are without inflammation  PERRLA/Irises = Pupils Round Reactive to Light and Irisis without inflammation  Ears/Nose = External structures and Nares have usual shape and form  Neck = No anterior or posterior adenopathy appreciated.  Thyroid = Not enlarged and no masses felt  Resp effort = Calm regular breathing  Breath Sounds = Good air movement with no rales or rhonchi in any lung fields  Heart Rate, Rythym, & sounds (no Murm)  = Regular rate and rythym with no S3, S4, or murmer appreciated.  Abdomen = Soft, nontender, no masses, & bowel sounds in all quadrants  Ext (edema) = No pretibial edema noted or elsewhere  Musculsktl =  Strength and ROM of major joints are within normal limits  Mood/Affect = Cooperative and interested     ASSESSMENT/PLAN:                                                      1. Dysuria  Increase water intake  cipro 250mg bid x 5 days  Culture pending  Results for orders placed or performed in visit on 06/07/18   Urinalysis w/reflex protein, bili ([facility])   Result Value Ref Range    Color Urine Yellow     pH Urine 5.5 5 - 9 pH    Specific Gravity Urine 1.025 1.005 - 1.025    Protein Urine 1+ (A) 0.01 mg/dL    Glucose Urine 0     Ketones Urine Trace (A)     Leukocyte Esterase Urine 3+ (A)     Blood Urine 2+ (A)     Nitrite Urine Pos (A) NEG    Bilirubin Urine Dip 2+ (A)     Urobilinogen Urine 1  (A) 0.2 - 1.0 EU/dL    WBC Urine Packed 0 - 3    RBC Urine Packed 0 - 3    Epithelial Cells Few     Crystal Urine 0     Bacteria Urine Many     Mucous Urine 0     Casts/LPF 0      Results for orders placed or performed in visit on 06/07/18   Urinalysis w/reflex protein, bili ([facility])   Result Value Ref Range    Color Urine Yellow     pH Urine 5.5 5  - 9 pH    Specific Gravity Urine 1.025 1.005 - 1.025    Protein Urine 1+ (A) 0.01 mg/dL    Glucose Urine 0     Ketones Urine Trace (A)     Leukocyte Esterase Urine 3+ (A)     Blood Urine 2+ (A)     Nitrite Urine Pos (A) NEG    Bilirubin Urine Dip 2+ (A)     Urobilinogen Urine 1  (A) 0.2 - 1.0 EU/dL    WBC Urine Packed 0 - 3    RBC Urine Packed 0 - 3    Epithelial Cells Few     Crystal Urine 0     Bacteria Urine Many     Mucous Urine 0     Casts/LPF 0    Urine Culture  Routine ([facility])   Result Value Ref Range    Urine Culture Final report (A)     Result 1 Klebsiella pneumoniae (A)     Result 2 Comment (A)     Antimicrobial Susceptibility Comment     Narrative    Performed at:  01 - [facility] [address], [place], [place]  [id number]  : Noe Madrigal MD, Phone:  [phone]     2. Major depressive disorder, recurrent episode, moderate (H)  Take vitamin D 2000 daily  - venlafaxine (EFFEXOR-XR) 75 MG 24 hr capsule; Take 2 capsules (150 mg) by mouth daily   Dispense: 90 capsule; Refill: 3      3. Chronic fatigue  Get some exercise  - VITAMIN B12 INJ /1000MCG  (** ADD QUANTITY **)  - THER/PROPH/DIAG INJ, SC/IM    4. Vitamin B12 deficiency (non anemic)  - VITAMIN B12 INJ /1000MCG  (** ADD QUANTITY **)  - THER/PROPH/DIAG INJ, SC/IM    F/u prn  The following health maintenance items are reviewed in Epic and correct as of today:  Health Maintenance   Topic Date Due     FALL RISK ASSESSMENT  01/03/2018     PHQ-9 Q6 MONTHS  06/14/2018     DEPRESSION ACTION PLAN Q1 YR  07/26/2018     TETANUS IMMUNIZATION (SYSTEM ASSIGNED)  07/27/2020     ADVANCE DIRECTIVE PLANNING Q5 YRS  11/10/2020     PNEUMOCOCCAL  Completed     INFLUENZA VACCINE  Completed       Fely Cleveland MD  Family Medicine    For any issues, please call my office  [facility] at [phone].

## 2018-06-07 NOTE — LETTER
[facility] [address] [place]  [address]  Phone: [phone]    June 12, 2018      Tina Powell  [address]  [facility] [address]              Dear Tina,    The results from your recent visit showed that your Urine culture shows Cipro susceptible.  Continue  as is finishing all of your Cipro antibiotic.        Sincerely,     Sharon Alonzo CNP    Results for orders placed or performed in visit on 06/07/18   Urinalysis w/reflex protein, bili ([facility])   Result Value Ref Range    Color Urine Yellow     pH Urine 5.5 5 - 9 pH    Specific Gravity Urine 1.025 1.005 - 1.025    Protein Urine 1+ (A) 0.01 mg/dL    Glucose Urine 0     Ketones Urine Trace (A)     Leukocyte Esterase Urine 3+ (A)     Blood Urine 2+ (A)     Nitrite Urine Pos (A) NEG    Bilirubin Urine Dip 2+ (A)     Urobilinogen Urine 1  (A) 0.2 - 1.0 EU/dL    WBC Urine Packed 0 - 3    RBC Urine Packed 0 - 3    Epithelial Cells Few     Crystal Urine 0     Bacteria Urine Many     Mucous Urine 0     Casts/LPF 0    Urine Culture  Routine ([facility])   Result Value Ref Range    Urine Culture Final report (A)     Result 1 Klebsiella pneumoniae (A)     Result 2 Comment (A)     Antimicrobial Susceptibility Comment     Narrative    Performed at:  01 - [facility]  [address] [place] [address]  [id number]  : Noe Madrigal MD, Phone:  [phone]

## 2018-06-08 ENCOUNTER — HOSPITAL ENCOUNTER (INPATIENT)
Facility: CLINIC | Age: 83
LOS: 5 days | Discharge: NURSING, REHAB OR LONG-TERM CARE | DRG: 689 | End: 2018-06-13
Attending: EMERGENCY MEDICINE | Admitting: INTERNAL MEDICINE
Payer: MEDICARE

## 2018-06-08 ENCOUNTER — APPOINTMENT (OUTPATIENT)
Dept: CT IMAGING | Facility: CLINIC | Age: 83
DRG: 689 | End: 2018-06-08
Attending: EMERGENCY MEDICINE
Payer: MEDICARE

## 2018-06-08 DIAGNOSIS — N30.00 ACUTE CYSTITIS WITHOUT HEMATURIA: ICD-10-CM

## 2018-06-08 DIAGNOSIS — R41.0 CONFUSION: Primary | ICD-10-CM

## 2018-06-08 PROBLEM — G93.40 ENCEPHALOPATHY: Status: ACTIVE | Noted: 2018-06-08

## 2018-06-08 LAB
ALBUMIN UR-MCNC: NEGATIVE MG/DL
AMORPH CRY #/AREA URNS HPF: ABNORMAL /HPF
ANION GAP SERPL CALCULATED.3IONS-SCNC: 8 MMOL/L (ref 3–14)
APPEARANCE UR: ABNORMAL
BACTERIA #/AREA URNS HPF: ABNORMAL /HPF
BASOPHILS # BLD AUTO: 0 10E9/L (ref 0–0.2)
BASOPHILS NFR BLD AUTO: 0.3 %
BILIRUB UR QL STRIP: NEGATIVE
BUN SERPL-MCNC: 15 MG/DL (ref 7–30)
CALCIUM SERPL-MCNC: 9.4 MG/DL (ref 8.5–10.1)
CHLORIDE SERPL-SCNC: 107 MMOL/L (ref 94–109)
CO2 SERPL-SCNC: 26 MMOL/L (ref 20–32)
COLOR UR AUTO: YELLOW
CREAT SERPL-MCNC: 1 MG/DL (ref 0.52–1.04)
DIFFERENTIAL METHOD BLD: ABNORMAL
EOSINOPHIL # BLD AUTO: 0.2 10E9/L (ref 0–0.7)
EOSINOPHIL NFR BLD AUTO: 2.2 %
ERYTHROCYTE [DISTWIDTH] IN BLOOD BY AUTOMATED COUNT: 14.4 % (ref 10–15)
GFR SERPL CREATININE-BSD FRML MDRD: 53 ML/MIN/1.7M2
GLUCOSE BLDC GLUCOMTR-MCNC: 129 MG/DL (ref 70–99)
GLUCOSE BLDC GLUCOMTR-MCNC: 68 MG/DL (ref 70–99)
GLUCOSE SERPL-MCNC: 74 MG/DL (ref 70–99)
GLUCOSE UR STRIP-MCNC: NEGATIVE MG/DL
HCT VFR BLD AUTO: 43.7 % (ref 35–47)
HGB BLD-MCNC: 14.2 G/DL (ref 11.7–15.7)
HGB UR QL STRIP: NEGATIVE
IMM GRANULOCYTES # BLD: 0 10E9/L (ref 0–0.4)
IMM GRANULOCYTES NFR BLD: 0.4 %
KETONES UR STRIP-MCNC: NEGATIVE MG/DL
LEUKOCYTE ESTERASE UR QL STRIP: ABNORMAL
LYMPHOCYTES # BLD AUTO: 4.2 10E9/L (ref 0.8–5.3)
LYMPHOCYTES NFR BLD AUTO: 39.5 %
MCH RBC QN AUTO: 27.1 PG (ref 26.5–33)
MCHC RBC AUTO-ENTMCNC: 32.5 G/DL (ref 31.5–36.5)
MCV RBC AUTO: 83 FL (ref 78–100)
MONOCYTES # BLD AUTO: 1.2 10E9/L (ref 0–1.3)
MONOCYTES NFR BLD AUTO: 11 %
MUCOUS THREADS #/AREA URNS LPF: PRESENT /LPF
NEUTROPHILS # BLD AUTO: 4.9 10E9/L (ref 1.6–8.3)
NEUTROPHILS NFR BLD AUTO: 46.6 %
NITRATE UR QL: NEGATIVE
NRBC # BLD AUTO: 0 10*3/UL
NRBC BLD AUTO-RTO: 0 /100
PH UR STRIP: 7 PH (ref 5–7)
PLATELET # BLD AUTO: 383 10E9/L (ref 150–450)
POTASSIUM SERPL-SCNC: 3.8 MMOL/L (ref 3.4–5.3)
RBC # BLD AUTO: 5.24 10E12/L (ref 3.8–5.2)
RBC #/AREA URNS AUTO: 1 /HPF (ref 0–2)
SODIUM SERPL-SCNC: 141 MMOL/L (ref 133–144)
SOURCE: ABNORMAL
SP GR UR STRIP: 1.02 (ref 1–1.03)
SQUAMOUS #/AREA URNS AUTO: <1 /HPF (ref 0–1)
UROBILINOGEN UR STRIP-MCNC: 2 MG/DL (ref 0–2)
WBC # BLD AUTO: 10.5 10E9/L (ref 4–11)
WBC #/AREA URNS AUTO: 5 /HPF (ref 0–5)

## 2018-06-08 PROCEDURE — 25000132 ZZH RX MED GY IP 250 OP 250 PS 637: Mod: GY | Performed by: INTERNAL MEDICINE

## 2018-06-08 PROCEDURE — 85025 COMPLETE CBC W/AUTO DIFF WBC: CPT | Performed by: EMERGENCY MEDICINE

## 2018-06-08 PROCEDURE — 80048 BASIC METABOLIC PNL TOTAL CA: CPT | Performed by: EMERGENCY MEDICINE

## 2018-06-08 PROCEDURE — 12000007 ZZH R&B INTERMEDIATE

## 2018-06-08 PROCEDURE — 99223 1ST HOSP IP/OBS HIGH 75: CPT | Mod: AI | Performed by: INTERNAL MEDICINE

## 2018-06-08 PROCEDURE — 00000146 ZZHCL STATISTIC GLUCOSE BY METER IP

## 2018-06-08 PROCEDURE — 70450 CT HEAD/BRAIN W/O DYE: CPT

## 2018-06-08 PROCEDURE — 25000128 H RX IP 250 OP 636: Performed by: INTERNAL MEDICINE

## 2018-06-08 PROCEDURE — 99285 EMERGENCY DEPT VISIT HI MDM: CPT | Mod: 25

## 2018-06-08 PROCEDURE — A9270 NON-COVERED ITEM OR SERVICE: HCPCS | Mod: GY | Performed by: INTERNAL MEDICINE

## 2018-06-08 PROCEDURE — 96365 THER/PROPH/DIAG IV INF INIT: CPT

## 2018-06-08 PROCEDURE — 25000128 H RX IP 250 OP 636: Performed by: EMERGENCY MEDICINE

## 2018-06-08 PROCEDURE — 81001 URINALYSIS AUTO W/SCOPE: CPT | Performed by: EMERGENCY MEDICINE

## 2018-06-08 PROCEDURE — 93005 ELECTROCARDIOGRAM TRACING: CPT

## 2018-06-08 RX ORDER — SODIUM CHLORIDE 9 MG/ML
INJECTION, SOLUTION INTRAVENOUS CONTINUOUS
Status: DISCONTINUED | OUTPATIENT
Start: 2018-06-08 | End: 2018-06-09

## 2018-06-08 RX ORDER — AMOXICILLIN 250 MG
1 CAPSULE ORAL 2 TIMES DAILY PRN
Status: DISCONTINUED | OUTPATIENT
Start: 2018-06-08 | End: 2018-06-13 | Stop reason: HOSPADM

## 2018-06-08 RX ORDER — ACETAMINOPHEN 325 MG/1
650 TABLET ORAL EVERY 4 HOURS PRN
Status: DISCONTINUED | OUTPATIENT
Start: 2018-06-08 | End: 2018-06-13 | Stop reason: HOSPADM

## 2018-06-08 RX ORDER — POTASSIUM CHLORIDE 1.5 G/1.58G
20-40 POWDER, FOR SOLUTION ORAL
Status: DISCONTINUED | OUTPATIENT
Start: 2018-06-08 | End: 2018-06-13 | Stop reason: HOSPADM

## 2018-06-08 RX ORDER — POTASSIUM CHLORIDE 7.45 MG/ML
10 INJECTION INTRAVENOUS
Status: DISCONTINUED | OUTPATIENT
Start: 2018-06-08 | End: 2018-06-13 | Stop reason: HOSPADM

## 2018-06-08 RX ORDER — MAGNESIUM SULFATE HEPTAHYDRATE 40 MG/ML
4 INJECTION, SOLUTION INTRAVENOUS EVERY 4 HOURS PRN
Status: DISCONTINUED | OUTPATIENT
Start: 2018-06-08 | End: 2018-06-13 | Stop reason: HOSPADM

## 2018-06-08 RX ORDER — ONDANSETRON 4 MG/1
4 TABLET, ORALLY DISINTEGRATING ORAL EVERY 6 HOURS PRN
Status: DISCONTINUED | OUTPATIENT
Start: 2018-06-08 | End: 2018-06-13 | Stop reason: HOSPADM

## 2018-06-08 RX ORDER — CEFTRIAXONE 1 G/1
1 INJECTION, POWDER, FOR SOLUTION INTRAMUSCULAR; INTRAVENOUS EVERY 24 HOURS
Status: DISCONTINUED | OUTPATIENT
Start: 2018-06-09 | End: 2018-06-13

## 2018-06-08 RX ORDER — NICOTINE POLACRILEX 4 MG
15-30 LOZENGE BUCCAL
Status: DISCONTINUED | OUTPATIENT
Start: 2018-06-08 | End: 2018-06-13 | Stop reason: HOSPADM

## 2018-06-08 RX ORDER — BISACODYL 10 MG
10 SUPPOSITORY, RECTAL RECTAL DAILY PRN
Status: DISCONTINUED | OUTPATIENT
Start: 2018-06-08 | End: 2018-06-13 | Stop reason: HOSPADM

## 2018-06-08 RX ORDER — POTASSIUM CL/LIDO/0.9 % NACL 10MEQ/0.1L
10 INTRAVENOUS SOLUTION, PIGGYBACK (ML) INTRAVENOUS
Status: DISCONTINUED | OUTPATIENT
Start: 2018-06-08 | End: 2018-06-13 | Stop reason: HOSPADM

## 2018-06-08 RX ORDER — CEFTRIAXONE 1 G/1
1 INJECTION, POWDER, FOR SOLUTION INTRAMUSCULAR; INTRAVENOUS ONCE
Status: COMPLETED | OUTPATIENT
Start: 2018-06-08 | End: 2018-06-08

## 2018-06-08 RX ORDER — NALOXONE HYDROCHLORIDE 0.4 MG/ML
.1-.4 INJECTION, SOLUTION INTRAMUSCULAR; INTRAVENOUS; SUBCUTANEOUS
Status: DISCONTINUED | OUTPATIENT
Start: 2018-06-08 | End: 2018-06-13 | Stop reason: HOSPADM

## 2018-06-08 RX ORDER — CLOPIDOGREL BISULFATE 75 MG/1
75 TABLET ORAL DAILY
Status: DISCONTINUED | OUTPATIENT
Start: 2018-06-09 | End: 2018-06-13 | Stop reason: HOSPADM

## 2018-06-08 RX ORDER — POTASSIUM CHLORIDE 29.8 MG/ML
20 INJECTION INTRAVENOUS
Status: DISCONTINUED | OUTPATIENT
Start: 2018-06-08 | End: 2018-06-13 | Stop reason: HOSPADM

## 2018-06-08 RX ORDER — VENLAFAXINE HYDROCHLORIDE 150 MG/1
150 TABLET, EXTENDED RELEASE ORAL DAILY
Status: DISCONTINUED | OUTPATIENT
Start: 2018-06-09 | End: 2018-06-13 | Stop reason: HOSPADM

## 2018-06-08 RX ORDER — ONDANSETRON 2 MG/ML
4 INJECTION INTRAMUSCULAR; INTRAVENOUS EVERY 6 HOURS PRN
Status: DISCONTINUED | OUTPATIENT
Start: 2018-06-08 | End: 2018-06-13 | Stop reason: HOSPADM

## 2018-06-08 RX ORDER — AMOXICILLIN 250 MG
2 CAPSULE ORAL 2 TIMES DAILY PRN
Status: DISCONTINUED | OUTPATIENT
Start: 2018-06-08 | End: 2018-06-13 | Stop reason: HOSPADM

## 2018-06-08 RX ORDER — DEXTROSE MONOHYDRATE 25 G/50ML
25-50 INJECTION, SOLUTION INTRAVENOUS
Status: DISCONTINUED | OUTPATIENT
Start: 2018-06-08 | End: 2018-06-13 | Stop reason: HOSPADM

## 2018-06-08 RX ORDER — POTASSIUM CHLORIDE 1500 MG/1
20-40 TABLET, EXTENDED RELEASE ORAL
Status: DISCONTINUED | OUTPATIENT
Start: 2018-06-08 | End: 2018-06-13 | Stop reason: HOSPADM

## 2018-06-08 RX ADMIN — CEFTRIAXONE SODIUM 1 G: 1 INJECTION, POWDER, FOR SOLUTION INTRAMUSCULAR; INTRAVENOUS at 13:25

## 2018-06-08 RX ADMIN — SODIUM CHLORIDE: 9 INJECTION, SOLUTION INTRAVENOUS at 16:06

## 2018-06-08 RX ADMIN — Medication 1 MG: at 23:59

## 2018-06-08 ASSESSMENT — ANXIETY QUESTIONNAIRES: GAD7 TOTAL SCORE: 7

## 2018-06-08 ASSESSMENT — PATIENT HEALTH QUESTIONNAIRE - PHQ9: SUM OF ALL RESPONSES TO PHQ QUESTIONS 1-9: 14

## 2018-06-08 ASSESSMENT — ACTIVITIES OF DAILY LIVING (ADL)
ADLS_ACUITY_SCORE: 21
ADLS_ACUITY_SCORE: 21

## 2018-06-08 NOTE — IP AVS SNAPSHOT
` `     [place] [address] [facility]: [phone]                 INTERAGENCY TRANSFER FORM - NOTES (H&P, Discharge Summary, Consults, Procedures, Therapies)   2018                    Hospital Admission Date: 2018  TINA STONE   : 10/7/1932  Sex: Female        Patient PCP Information     Provider PCP Type    Dian Tian MD General         History & Physicals      H&P by Moreno Carlson MD at 2018  1:24 PM     Author:  Moreno Carlson MD Service:  Hospitalist Author Type:  Physician    Filed:  2018  3:24 PM Date of Service:  2018  1:24 PM Creation Time:  2018  1:24 PM    Status:  Signed :  Moreno Carlson MD (Physician)         [facility]  Hospitalist Admission Note  Name: Tina Stone    MRN: [id number]  YOB: 1932    Age: 85 year old  Date of admission: 2018  Primary care provider: Dian Tian    Chief Complaint:  Altered mental status    Tina Stone is a 85 year old female with PMH including with past medical history including CVA, hypertension, possible dementia who presents with altered mental status[MF1.1] in the setting of a recently diagnosed UTI.  She was started on Cipro yesterday as an outpatient but has become more confused and unsteady.[MF1.2]  She presented to the emergency room and here does not have focal neurologic deficits and has a negative CT scan of her head.  Basic lab workup was unrevealing for acute pathology she was started on IV ceftriaxone and fluids for suspected UTI with associated encephalopathy.  I am now asked to admit her for further care.[MF1.3]    Assessment and Plan:   1. Encephalopathy:[MF1.1] This appears to be most likely toxic metabolic/infectious related to her recently diagnosed urinary tract infection.  Note that she carries a prescription for alprazolam which could be playing a role as well.  Ultimately it seems that  "she likely has at least some mild underlying dementia and has minimal reserve leading to her current presentation.  She has a nonfocal neuro exam and CT scan of her head is negative.  If she fails to clear we could consider MRI down the line but at this point I would favor just resuming her home Plavix and monitoring as I have a lower clinical suspicion for stroke.[MF1.2]  --admit to inpatient status  --treat UTI  --gentle hydration  --hold psychoactive meds including xanax    2.   UTI: diagnosed from clinic yesterday, no UC available yet.[MF1.1]  Her urine sample from 6/7 shows positive nitrate with positive leuk esterase with white cells and reportedly \"many\" bacteria.  I do not see that a urine culture was run on this.  UA from today is actually relatively bland so than not sure that culture after 3 doses of antibiotics will be helpful in this case.  We will plan to treat empirically with a course of antibiotics here in the hospital.  She was started on IV ceftriaxone in the emergency room so we will continue that here though frankly I think Cipro would still be acceptable.[MF1.2]      3.   Generalized weakness, fall risk:[MF1.1] Suspect due to UTI with diminished reserve.We will consult physical therapy.  It sounds as though she may have had a fall at home but there is no apparent traumatic injury and CT scan of her head is negative.[MF1.2]    4.   Possible[MF1.1] mild[MF1.3] dementia[MF1.1] versus worsening depression[MF1.3]:[MF1.1] Per her daughter she has some minor memory issues but actually is quite independent.  She also notes that she seems to be having more depressive symptoms lately and is much less active which may actually be the issue.  Her daughter notes that her antidepressant was recently increased as well due to this issue.[MF1.3]    5.   Prior CVA: currently w/ non-focal neurologic exam.[MF1.1]  Per her daughter her prior stroke was at least 2 years ago and she does not have residual deficits.  " Daughter notes there is some question as to whether or not she had visual disturbances at home.  While this could represent a TIA she currently does not have deficits swelling hold off on MRI is urinary tract infection/encephalopathy likely explains her presentation  --Resume home Plavix  --Statin allergy noted   --currently no focal neurologic deficits.  Will defer MRI at this time   But if she manifests any neurologic deficits could revisit this[MF1.3]      6.  Mild hypoglycemia: BG was 68 in the ER.  Improved on recheck.  Will monitor.  ?related to poor oral intake from #1-2.[MF1.1]  --Hypoglycemia protocol ordered  --Regular diet[MF1.3]    7.[MF1.1]  Psoriasis: Chronically on Humira.[MF1.3]    DVT Prophylaxis: Pneumatic Compression Devices  Code Status:[MF1.1] Full Code[MF1.3]  Discharge Dispo: admit to inpatient status      History of Present Illness:  Tina Powell is a 85 year old female with PMH including with past medical history including CVA, hypertension, possible dementia who presents with altered mental status.  She presents with her daughter who helps to provide some of the history.  She was recently diagnosed with urinary tract infection and started on Cipro yesterday out 2 days ago.  She had taken 3 doses of Cipro so far.  Daughter reported that she seemed agitated yesterday and somewhat confused prompting her to bring her to her primary doctor in the first place.  She has had reduced appetite and now today her daughter noticed that she seemed more unsteady and more confused prompting her to bring her to the emergency room.[MF1.1]    Daughter notes that at baseline the patient is actually quite independent and at most has mild memory issues.  She still drives and manages her ADLs.  Her daughter does note however that she seem much more depressed lately and less active, essentially sitting and watching TV all day and then going to bed.  Her daughter also notes that she thinks her mother had  some visual issues at home as well as dizziness.[MF1.3]    Here in the emergency room she was afebrile.  BG was low at 68.  Basic labs were otherwise unrevealing of acute pathology.  CT scan of her head was negative for acute pathology as well.[MF1.1]  She was started on IV ceftriaxone and fluids and I am now asked to admit her for further care[MF1.3]     Past Medical History:  Past Medical History:   Diagnosis Date     Depressed      Dyslipidemia      H/O: CVA (cerebrovascular accident) 2014 2013     HTN (hypertension)     resolved years ago,  on no treatment now.     Hyperlipidaemia LDL goal < 100      Psoriases      Risk for falls      Urosepsis 6-2007     Past Surgical History:  Past Surgical History:   Procedure Laterality Date     HYSTERECTOMY  1950    fibroids     Social History:  Social History   Substance Use Topics     Smoking status: Former Smoker     Smokeless tobacco: Never Used      Comment: Quit last Thursday     Alcohol use 1.8 - 2.4 oz/week     1 - 2 Glasses of wine, 2 Standard drinks or equivalent per week     Social History     Social History Narrative     Family History:  Family History   Problem Relation Age of Onset     HEART DISEASE Mother      CEREBROVASCULAR DISEASE Father      Allergies:  Allergies   Allergen Reactions     Sulfa Drugs Swelling     Hmg-Coa-R Inhibitors Other (See Comments)     Weakness, tingling  Zocor       Medications:[MF1.1]  Prior to Admission Medications   Prescriptions Last Dose Informant Patient Reported? Taking?   ALPRAZolam (XANAX) 0.5 MG tablet   Yes No   Sig: Take 0.5 mg by mouth as needed for anxiety    Coenzyme Q10 (CO Q 10) 100 MG CAPS   Yes No   Sig: Take 200 mg by mouth daily   Magnesium 400 MG CAPS   Yes No   Sig: Take 1 tablet by mouth daily   acetaminophen (TYLENOL) 325 MG tablet   No No   Sig: Take 1 tablet (325 mg) by mouth every 6 hours as needed for mild pain   adalimumab (HUMIRA) 20 MG/0.4ML KIT   Yes No   Sig: Inject Subcutaneous every 14 days    cholecalciferol (VITAMIN D) 1000 UNIT tablet   Yes No   Sig: Take  3 tablets daily.   ciprofloxacin (CIPRO) 250 MG tablet   No No   Sig: Take 1 tablet (250 mg) by mouth 2 times daily for 5 days   clobetasol (TEMOVATE) 0.05 % ointment   Yes No   Sig: Apply 1 Application topically Applies 2-3 times a week   clopidogrel (PLAVIX) 75 MG tablet   No No   Sig: Take 1 tablet (75 mg) by mouth daily   cyanocobalamin (VITAMIN B12) 1000 MCG/ML injection   Yes No   Sig: Inject 1 mL into the muscle every 30 days   fenofibrate 48 MG tablet   No No   Sig: Take 1 tablet (48 mg) by mouth daily   hydrochlorothiazide 12.5 MG TABS tablet   No No   Sig: Take 1 tablet (12.5 mg) by mouth daily   omega 3 1000 MG CAPS   Yes No   Sig: Take 3 grams daily   omeprazole (PRILOSEC) 20 MG CR capsule   No No   Sig: Take 1 capsule (20 mg) by mouth daily   pyridoxine (VITAMIN B-6) 50 MG TABS   Yes No   Sig: Take 100 mg by mouth daily   venlafaxine (EFFEXOR-XR) 75 MG 24 hr capsule   No No   Sig: Take 2 capsules (150 mg) by mouth daily      Facility-Administered Medications: None[MF1.4]       Review of Systems:  A Comprehensive greater than 10 system review of systems was carried out.  Pertinent positives and negatives are noted above.  Otherwise negative for contributory information.     Physical Exam:  Blood pressure 148/87, temperature 97.6  F (36.4  C), temperature source Oral, resp. rate 18, weight 74.8 kg (165 lb), SpO2 93 %, not currently breastfeeding.  Wt Readings from Last 1 Encounters:   06/08/18 74.8 kg (165 lb)     Exam:  General: Alert, awake, no acute distress.  HEENT: NC/AT, eyes anicteric, external occular movements intact, face symmetric.  Dentition WNL, MM moist.  Cardiac: RRR, S1, S2.  No murmurs appreciated.  Pulmonary: Normal chest rise, normal work of breathing.  Lungs CTA BL  Abdomen: soft, non-tender, non-distended.  Bowel Sounds Present.  No guarding.  Extremities: no deformities.  Warm, well perfused.  Skin: no rashes or  lesions noted.  Warm and Dry.  Neuro:[MF1.1] Neurologic: Face symmetric, CN 2-12 intact including PERRLA.  Coordination intact to finger-nose finger, heal-shin.  Sensation intact to light touch in all four extremities.  Strength 5/5 and equal in all four extremities.   Normal tandem gait.  Speech clear.  Normal tone.  No clonus.[MF1.3]  Psych:[MF1.1] Disoriented to some recent events but overall calm and appropriate[MF1.3].    Data:  EKG:  NSR, non-specific ST changes.  Imaging:  Results for orders placed or performed during the hospital encounter of 06/08/18   CT Head w/o Contrast    Narrative    CT OF THE HEAD WITHOUT CONTRAST 6/8/2018 12:50 PM     COMPARISON: Head CT 8/5/2015.    HISTORY: History of CVA, mild headache, confusion, evaluate for bleed  or large new stroke.     TECHNIQUE: 5 mm thick axial CT images of the head were acquired  without IV contrast material.    FINDINGS:  There is moderate diffuse cerebral volume loss. There are  extensive confluent areas of decreased density in the cerebral white  matter bilaterally that are consistent with sequela of chronic small  vessel ischemic disease.     The ventricles and basal cisterns are within normal limits in  configuration given the degree of cerebral volume loss.  There is no  midline shift. There are no extra-axial fluid collections.     No intracranial hemorrhage, mass or recent infarct.    The visualized paranasal sinuses are well-aerated. There is no  mastoiditis. There are no fractures of the visualized bones.       Impression    IMPRESSION:  Diffuse cerebral volume loss and cerebral white matter  changes consistent with chronic small vessel ischemic disease. No  evidence for acute intracranial pathology.       Radiation dose for this scan was reduced using automated exposure  control, adjustment of the mA and/or kV according to patient size, or  iterative reconstruction technique.     Labs:    Recent Labs  Lab 06/08/18  1213   WBC 10.5   HGB 14.2    HCT 43.7   MCV 83             Lab Results   Component Value Date     06/08/2018     10/03/2017     06/22/2016    Lab Results   Component Value Date    CHLORIDE 107 06/08/2018    CHLORIDE 100 10/03/2017    CHLORIDE 102 06/22/2016    Lab Results   Component Value Date    BUN 15 06/08/2018    BUN 12 10/03/2017    BUN 14 10/03/2017    BUN 14 06/22/2016    BUN 14 06/22/2016      Lab Results   Component Value Date    POTASSIUM 3.8 06/08/2018    POTASSIUM 4.5 10/03/2017    POTASSIUM 4.7 06/22/2016    Lab Results   Component Value Date    CO2 26 06/08/2018    CO2 27 08/09/2015    CO2 25 08/08/2015    Lab Results   Component Value Date    CR 1.00 06/08/2018    CR 0.84 10/03/2017    CR 1.02 06/22/2016        No results for input(s): LACT in the last 168 hours.  No results for input(s): TROPONIN, TROPI, TROPR in the last 168 hours.    Invalid input(s): TROP, TROPONINIES    Recent Labs  Lab 06/07/18  1042   COLOR Yellow   SG 1.025   URINEPH 5.5   UROBILINOGEN 1 *   NITRITE Pos*   RBCU Packed   WBCU Packed         Zac MD Aldo  Hospitalist  [facility][MF1.1]             Revision History        User Key Date/Time User Provider Type Action    > MF1.3 6/8/2018  3:24 PM Moreno Carlson MD Physician Sign     MF1.2 6/8/2018  2:00 PM Moreno Carlson MD Physician      MF1.4 6/8/2018  1:31 PM Moreno Carlson MD Physician      MF1.1 6/8/2018  1:24 PM Moreno Carlson MD Physician                      Discharge Summaries      Discharge Summaries by Arturo Jauregui DO at 6/13/2018 11:53 AM     Author:  Arturo Jauregui DO Service:  Hospitalist Author Type:  Physician    Filed:  6/13/2018 11:53 AM Date of Service:  6/13/2018 11:53 AM Creation Time:  6/13/2018 11:50 AM    Status:  Signed :  Ricklefs, Arturo D, DO (Physician)         Discharge Summary  Hospitalist Service      Tina Yanna Powell MRN# [id number]   Date of  Birth: 10/7/1932 Age: 85 year old     Date of Admission:  6/8/2018  Date of Discharge:  6/13/2018  Admitting Physician:  Moreno Carlson MD  Discharge Physician: Arturo Jauregui DO   Discharging Service: Hospitalist Service     Primary Provider: Dian Tian  Primary Care Physician Phone Number: [phone]         Discharge Diagnoses/Problem Oriented Hospital Course (Providers):    Tina Powell was admitted on 6/8/2018 by Moreno Carlson MD and I would refer you to their history and physical.  The following problems were addressed during her hospitalization:  1. Urinary tract infection.  Has been on IV ceftriaxone.  Urine culture from outside facility with Klebsiella and group B strep.  Now changed to Ceftin 500 mg twice a day for the next 2 days to complete 7 day course.  2. Acute encephalopathy.  With suspected underlying chronic dementia.  Acute issues likely due to UTI.  Had been slowly improving.  Seems to have stabilized at this point with significant residual memory deficits.  Would recommend Neurology follow up in the outpatient setting in 3 weeks to further evaluate.  3. Previous stroke.  Continue clopidogrel and fenofibrate.  4. Hypertension.  Continue to hold hydrochlorothiazide.  Primary care physician will need to reevaluate in the outpatient setting.  5. GERD.  Omeprazole.  6. Depression.  Continue venlafaxine.  7. Chronic kidney disease.  Creatinine has been stable.  Avoid nephrotoxins as able.  8. Deconditioning.  Physical and Occupational Therapy consults.  Discharge to transitional care unit.    # Discharge Pain Plan:   - Patient currently has NO PAIN and is not being prescribed pain medications on discharge.            Code Status:      Full Code         Pending Results:        Unresulted Labs Ordered in the Past 30 Days of this Admission     No orders found from 4/9/2018 to 6/9/2018.               Discharge Instructions and Follow-Up:       Follow-up Appointments     Follow Up and recommended labs and tests       Follow up with primary care provider in 1 week.  The following labs/tests   are recommended: BMP and CBC in 7 days.  Follow up with Neurology in 3   weeks.                         Discharge Disposition:      Discharged to rehabilitation facility          Discharge Medications:        Current Discharge Medication List      START taking these medications    Details   cefuroxime (CEFTIN) 500 MG tablet Take 1 tablet (500 mg) by mouth every 12 hours for 2 days  Qty: 4 tablet, Refills: 0    Associated Diagnoses: Acute cystitis without hematuria         CONTINUE these medications which have NOT CHANGED    Details   acetaminophen (TYLENOL) 325 MG tablet Take 1 tablet (325 mg) by mouth every 6 hours as needed for mild pain  Qty: 20 tablet, Refills: 0    Associated Diagnoses: Sepsis (H)      cholecalciferol (VITAMIN D) 1000 UNIT tablet Take  3 tablets daily.  Qty: 100 tablet, Refills: 3    Associated Diagnoses: Low vitamin D level      clobetasol (TEMOVATE) 0.05 % ointment Apply 1 Application topically daily as needed Applies 2-3 times a week   Refills: 1      clopidogrel (PLAVIX) 75 MG tablet Take 1 tablet (75 mg) by mouth daily  Qty: 90 tablet, Refills: 3    Associated Diagnoses: Encounter for medication refill      cyanocobalamin (VITAMIN B12) 1000 MCG/ML injection Inject 1 mL into the muscle every 30 days      Magnesium 400 MG CAPS Take 1 tablet by mouth daily    Associated Diagnoses: Major depressive disorder, recurrent episode, moderate (H)      omega 3 1000 MG CAPS Take 3 grams daily  Qty: 90 capsule    Associated Diagnoses: Mixed hyperlipidemia      pyridoxine (VITAMIN B-6) 50 MG TABS Take 100 mg by mouth daily    Associated Diagnoses: Major depressive disorder, recurrent episode, moderate (H)      venlafaxine (EFFEXOR-XR) 75 MG 24 hr capsule Take 2 capsules (150 mg) by mouth daily  Qty: 90 capsule, Refills: 3    Associated Diagnoses: Major  "depressive disorder, recurrent episode, moderate (H)      fenofibrate 48 MG tablet Take 1 tablet (48 mg) by mouth daily  Qty: 90 tablet, Refills: 0    Comments: Pharmacy,  If 48 mg is not covered please see if 54 mg would be covered under insurance and let us know.  Thanks.  Associated Diagnoses: Hypercholesteremia      omeprazole (PRILOSEC) 20 MG CR capsule Take 1 capsule (20 mg) by mouth daily  Qty: 30 capsule, Refills: 3    Associated Diagnoses: Chronic cough; Dyspepsia         STOP taking these medications       adalimumab (HUMIRA) 20 MG/0.4ML KIT Comments:   Reason for Stopping:         ALPRAZolam (XANAX) 0.5 MG tablet Comments:   Reason for Stopping:         ciprofloxacin (CIPRO) 250 MG tablet Comments:   Reason for Stopping:         Coenzyme Q10 (CO Q 10) 100 MG CAPS Comments:   Reason for Stopping:         hydrochlorothiazide 12.5 MG TABS tablet Comments:   Reason for Stopping:                    Allergies:         Allergies   Allergen Reactions     Sulfa Drugs Swelling     Hmg-Coa-R Inhibitors Other (See Comments)     Weakness, tingling  Zocor              Condition and Physical Exam on Discharge:      Discharge condition: Stable   Discharge vitals: Blood pressure 154/78, pulse 71, temperature 98.1  F (36.7  C), temperature source Oral, resp. rate 18, height 1.676 m (5' 6\"), weight 75.4 kg (166 lb 4.8 oz), SpO2 94 %, not currently breastfeeding.   Gen:  NAD, A&Ox1 to self.  Not oriented to place or time.  Eyes:  PERRL, sclera anicteric.  OP:  MMM, no lesions.  Neck:  Supple.  CV:  Regular, no murmurs.  Lung:  CTA b/l, normal effort.  Ab:  +BS, soft.  Skin:  Warm, dry to touch.  No rash.  Ext:  No pitting edema LE b/l.          Discharge Orders for Skilled Facility (from Discharge Orders):        After Care Instructions     Activity - Up with nursing assistance           Advance Diet as Tolerated       Follow this diet upon discharge: Regular            General info for SNF       Length of Stay Estimate: " Short Term Care: Estimated # of Days <30  Condition at Discharge: Improving  Level of care:skilled   Rehabilitation Potential: Good  Admission H&P remains valid and up-to-date: Yes  Recent Chemotherapy: N/A  Use Nursing Home Standing Orders: Yes            Mantoux instructions       Give two-step Mantoux (PPD) Per Facility Policy Yes                           Rehab orders for Skilled Facility (from Discharge Orders):      Referrals     Future Labs/Procedures    Occupational Therapy Adult Consult     Comments:    Evaluate and treat as clinically indicated.    Reason:  Weakness    Physical Therapy Adult Consult     Comments:    Evaluate and treat as clinically indicated.    Reason:  Weakness             Discharge Time:      I have spent 35 minutes with Ms. Powell and working on discharge on 6/13/18.        Image Results From This Hospital Stay (For Non-EPIC Providers):        Results for orders placed or performed during the hospital encounter of 06/08/18   CT Head w/o Contrast    Narrative    CT OF THE HEAD WITHOUT CONTRAST 6/8/2018 12:50 PM     COMPARISON: Head CT 8/5/2015.    HISTORY: History of CVA, mild headache, confusion, evaluate for bleed  or large new stroke.     TECHNIQUE: 5 mm thick axial CT images of the head were acquired  without IV contrast material.    FINDINGS:  There is moderate diffuse cerebral volume loss. There are  extensive confluent areas of decreased density in the cerebral white  matter bilaterally that are consistent with sequela of chronic small  vessel ischemic disease.     The ventricles and basal cisterns are within normal limits in  configuration given the degree of cerebral volume loss.  There is no  midline shift. There are no extra-axial fluid collections.     No intracranial hemorrhage, mass or recent infarct.    The visualized paranasal sinuses are well-aerated. There is no  mastoiditis. There are no fractures of the visualized bones.       Impression    IMPRESSION:  Diffuse  cerebral volume loss and cerebral white matter  changes consistent with chronic small vessel ischemic disease. No  evidence for acute intracranial pathology.       Radiation dose for this scan was reduced using automated exposure  control, adjustment of the mA and/or kV according to patient size, or  iterative reconstruction technique.    MILA SWEET MD           Most Recent Lab Results In EPIC (For Non-EPIC Providers):    Most Recent 3 CBC's:  Recent Labs   Lab Test  06/10/18   0606  06/08/18   1213  10/03/17   1122   WBC  7.6  10.5  9.2   HGB  12.5  14.2  14.5   MCV  85  83  86.9   PLT  285  383  269      Most Recent 3 BMP's:  Recent Labs   Lab Test  06/11/18   1012  06/10/18   0606  06/08/18   1213  10/03/17   1120   08/09/15   0748   NA   --   141  141  138   < >  138   POTASSIUM  4.0  3.6  3.8  4.5   < >  3.8   CHLORIDE   --   107  107  100   < >  105   CO2   --   28  26   --    --   27   BUN   --   12  15  12  14   < >  14   CR   --   0.89  1.00  0.84   < >  0.85   ANIONGAP   --   6  8   --    --   6   MICHELLE   --   8.6  9.4  9.4   < >  8.4*   GLC   --   95  74  101*   < >  96    < > = values in this interval not displayed.     Most Recent 3 Troponin's:No lab results found.  Most Recent 3 INR's:  Recent Labs   Lab Test  08/05/15   2315  12/26/13   1324   INR  1.18*  0.98     Most Recent 2 LFT's:  Recent Labs   Lab Test  06/10/18   0606  10/03/17   1120   AST  19  29   ALT  27  18   ALKPHOS  42  73   BILITOTAL  0.9  0.5     Most Recent Cholesterol Panel:  Recent Labs   Lab Test  12/14/17   1332   CHOL  251*   LDL  164*   HDL  32*   TRIG  277*     Most Recent 6 Bacteria Isolates From Any Culture (See EPIC Reports for Culture Details):  Recent Labs   Lab Test  08/06/15   0015  08/05/15   2317   CULT  Cultured on the 1st day of incubation: Escherichia coli Susceptibility testing   done on previous specimen  Critical Value/Significant Value, preliminary result only, called to and read   back by Kenia Guillory RN  at 1638 on 8.6.15. KD  *  Cultured on the 1st day of incubation: Escherichia coli  Critical Value/Significant Value, preliminary result only, called to and read   back by Kenia Guillory, @1550 on 8/6/15 HM  (Note)  POSITIVE for E. COLI by Verigene multiplex nucleic acid test. Final  identification and antimicrobial susceptibility testing will be  verified by standard methods.    Specimen tested with Verigene multiplex, gram-negative blood culture  nucleic acid test for the following targets: Acinetobacter sp.,  Citrobacter sp., Enterobacter sp., Proteus sp., E. coli, K.  pneumoniae/oxytoca, P. aeruginosa, and the following resistance  markers: CTXM, KPC, NDM, VIM, IMP and OXA.    Critical Value/Significant Value called to and read back by Kenia Guillory RN 5th floor 8/6/15 1850 dg  *  >100,000 colonies/mL Escherichia coli*     Most Recent TSH, T4 and HgbA1c:  Recent Labs   Lab Test  08/12/14   1125   T4  0.98[KR1.1]        Revision History        User Key Date/Time User Provider Type Action    > KR1.1 6/13/2018 11:53 AM Arturo Jauregui DO Physician Sign                     Consult Notes      Consults by White, Corinne C, LSW at 6/11/2018 11:26 AM     Author:  White, Corinne C, LSW Service:  (none) Author Type:      Filed:  6/11/2018 11:26 AM Date of Service:  6/11/2018 11:26 AM Creation Time:  6/11/2018 11:19 AM    Status:  Signed :  White, Corinne C, LSW ()     Consult Orders:    1. Social Work IP Consult [[id number]] ordered by Arturo Jauregui DO at 06/11/18 1117                Care Transition Initial Assessment -   Reason For Consult: discharge planning  Met with: Patient and Family    Active Problems:    Encephalopathy       DATA  Lives With: alone  Living Arrangements: apartment- Pt lives in a senior apt that does NOT offer any services   Description of Support System: Supportive, Involved  Who is your support system?: Children  Support Assessment: Adequate family  and caregiver support. Pt has not outside support services, has still been driving.  Identified issues/concerns regarding health management: Pt being treated for UTI has more confusion then normal      Resources List: Skilled Nursing Facility  Pt has been to TCU in the past.         Transportation Available: family or friend will provide  Family would prefer to transport   ASSESSMENT  Cognitive Status:  awake  Concerns to be addressed: PT lives alone, has been doing her own cooking,cleaning and driving prior to admission. Pt has been to [facility] in the past. This would be 1st choice for placement. PT would be open to either shared or private room    PT at this time is using a walker but at baseline does not use and DME other than her life line    PLAN  Following for TCU for Wed[CW1.1]         Revision History        User Key Date/Time User Provider Type Action    > CW1.1 6/11/2018 11:26 AM White, Corinne C, LSW  Sign                     Progress Notes - Physician (Notes from 06/10/18 through 06/13/18)      Progress Notes by White, Corinne C, LSW at 6/13/2018 11:57 AM     Author:  White, Corinne C, LSW Service:  (none) Author Type:      Filed:  6/13/2018 11:58 AM Date of Service:  6/13/2018 11:57 AM Creation Time:  6/13/2018 11:57 AM    Status:  Signed :  White, Corinne C, LSW ()         Discharge Planner   Discharge Plans in progress: Spoke with daughter. She plans to trasnport at 1330 to TCU   Barriers to discharge plan: None  Follow up plan:[CW1.1]      06/13/18 1100   Final Resources   Skilled Nursing Facility [facility] ([place]) [phone], Fax: [phone][CW1.2]          Entered by: Corinne C. White 06/13/2018 11:57 AM[CW1.1]          Revision History        User Key Date/Time User Provider Type Action    > CW1.2 6/13/2018 11:58 AM White, Corinne C, LSW  Sign     CW1.1 6/13/2018 11:57 AM White, Corinne C, LSW               Progress Notes by White, Corinne C, LSW at 6/12/2018 12:42 PM     Author:  White, Corinne C, LSW Service:  (none) Author Type:      Filed:  6/12/2018 12:47 PM Date of Service:  6/12/2018 12:42 PM Creation Time:  6/12/2018 12:42 PM    Status:  Signed :  White, Corinne C, LSW ()         CM: Spoke with [facility] about TCU placement. They would need pt's Humara injection to be placed on hold while in TCU.. Cost of injection would be $5,000. TCU only able to take pt if MD can write on DC orders that Injection will be placed on hold until after DC from TCU.  Spoke with Daughter and MD,. Both are comfortable with placing this medication injection on hold until out of TCU  I/P Anticipate dc tomorrow to TCU. Family prefer to transport[CW1.1]      Revision History        User Key Date/Time User Provider Type Action    > CW1.1 6/12/2018 12:47 PM White, Corinne C, LSW  Sign            Progress Notes by Arturo Jauregui DO at 6/12/2018 11:53 AM     Author:  Arturo Jauregui DO Service:  Hospitalist Author Type:  Physician    Filed:  6/12/2018 12:03 PM Date of Service:  6/12/2018 11:53 AM Creation Time:  6/12/2018 11:53 AM    Status:  Signed :  Arturo Jauregui DO (Physician)         [facility]  Hospitalist Progress Note  Arturo Jauregui DO 06/12/2018    Reason for Stay (Diagnosis): UTI.         Assessment and Plan:      Summary of Stay: Tina Powell is a 85 year old female with a past medical history significant for stroke, hypertension, and possible dementia who was admitted on 6/8/2018 with urinary tract infection.  Problem List:   1. Urinary tract infection.  Continue IV ceftriaxone.  Urine culture from outside facility with Klebsiella and group B strep.  2. Acute encephalopathy.  With suspected underlying chronic dementia.  Acute issues likely due to UTI.  Had been slowly improving.  Seems to have stabilized at this point with  "significant residual memory deficits.  Would recommend Neurology follow up in the outpatient setting in 7-10 days to further evaluate.  3. Previous stroke.  Continue clopidogrel and fenofibrate.  4. Hypertension.  Continue to hold hydrochlorothiazide.  IV hydralazine if needed.  5. GERD.  Omeprazole.  6. Depression.  Continue venlafaxine.  7. Chronic kidney disease.  Creatinine has been stable.  Avoid nephrotoxins as able.  8. Deconditioning.  Physical and Occupational Therapy consults.  DVT Prophylaxis: Pneumatic Compression Devices  Code Status: Full Code  Discharge Dispo: TCU.  Estimated Disch Date / # of Days until Disch: 1-2.        Interval History (Subjective):      Denies CP, SOB, F/C, N/V, or diarrhea.                  Physical Exam:      Last Vital Signs:  /73 (BP Location: Left arm)  Pulse 71  Temp 96.3  F (35.7  C) (Oral)  Resp 20  Ht 1.676 m (5' 6\")  Wt 77.7 kg (171 lb 3.2 oz)  SpO2 92%  BMI 27.63 kg/m2    Gen:  NAD, A&Ox1 to self.  Not oriented to place or time.  Eyes:  PERRL, sclera anicteric.  OP:  MMM, no lesions.  Neck:  Supple.  CV:  Regular, no murmurs.  Lung:  CTA b/l, normal effort.  Ab:  +BS, soft.  Skin:  Warm, dry to touch.  No rash.  Ext:  No pitting edema LE b/l.           Medications:      All current medications were reviewed with changes reflected in problem list.         Data:      All new lab and imaging data was reviewed.   Labs:[KR1.1]    Recent Labs  Lab 06/11/18  1012 06/10/18  0606   NA  --  141   POTASSIUM 4.0 3.6   CHLORIDE  --  107   CO2  --  28   ANIONGAP  --  6   GLC  --  95   BUN  --  12   CR  --  0.89   GFRESTIMATED  --  60*   GFRESTBLACK  --  72   MICHELLE  --  8.6       Recent Labs  Lab 06/10/18  0606   WBC 7.6   HGB 12.5   HCT 39.1   MCV 85   [KR1.2]      Imaging:[KR1.1]   No results found for this or any previous visit (from the past 24 hour(s)).[KR1.2]       Revision History        User Key Date/Time User Provider Type Action    > KR1.2 6/12/2018 12:03 " PM Arturo Jauregui DO Physician Sign     KR1.1 6/12/2018 11:53 AM Arturo Jauregui DO Physician             Progress Notes by Ashlyn Dowell at 6/12/2018 10:50 AM     Author:  Ashlyn Dowell Service:  (none) Author Type:  (none)    Filed:  6/12/2018 10:50 AM Encounter Date:  6/7/2018 Status:  Signed    :  Ashlyn Dowell           Results mailed to patient today  Ashlyn Dowell MA   Note: patient is in [facility] currently[DO1.1]   Revision History        User Key Date/Time User Provider Type Action    > DO1.1 6/12/2018 10:50 AM Ashlyn Dowell (none) Sign            Progress Notes by Sharon Alonzo APRN CNP at 6/7/2018  9:30 AM     Author:  Sharon Alonzo APRN CNP Service:  (none) Author Type:  Nurse Practitioner    Filed:  6/12/2018 10:22 AM Encounter Date:  6/7/2018 Status:  Addendum    :  Sharon Alonzo APRN CNP (Nurse Practitioner)           Problem(s) Oriented visit      SUBJECTIVE:                                                    Tina Powell is a 85 year old female who presents to clinic today[DM1.1] with[SS1.1] Daughter[SS1.2] who[SS1.1] states pt has been[SS1.2] more[SS1.1] confused past 2 weeks and wonders about bladder infection. Pt reports frequency.  Pt has also been tired and needs B12 injection. Pt[SS1.2] and daughter report pt[SS1.1] has been unmotivated and sleeping a lot and wanting increase in effexor.[SS1.2] Reports falling 2-3 times in 1 year, on uneven ground. Discussed standing slowly[SS1.3] and getting balance first, not having rugs or excessive things in walk ways[SS1.1].[SS1.3]       Problem list, Medication list, Allergies, and Medical/Social/Surgical histories reviewed in EPIC and updated as appropriate.       ROS:[DM1.1]  5 point ROS completed and negative except noted above, including Gen, CV, Resp, GI, MS[SS1.1]    Histories:   Patient Active Problem List   Diagnosis     Psoriasis     Dyslipidemia     Health Care Home     Advance Care Planning      H/O: CVA (cerebrovascular accident)     Cerebral infarction (H)     Risk for falls     Sepsis (H)     Major depressive disorder, recurrent episode, moderate (H)     Alcoholism (H)     Tobacco abuse     Past Medical History:   Diagnosis Date     Depressed      Dyslipidemia      H/O: CVA (cerebrovascular accident) 2014 2013     HTN (hypertension)     resolved years ago,  on no treatment now.     Hyperlipidaemia LDL goal < 100      Psoriases      Risk for falls      Urosepsis 6-2007     Past Surgical History:   Procedure Laterality Date     HYSTERECTOMY  1950    fibroids     Social History   Substance Use Topics     Smoking status: Current Some Day Smoker     Smokeless tobacco: Never Used      Comment: Social, states she does not smoke daily, about 12 cigs per week     Alcohol use 1.8 - 2.4 oz/week     1 - 2 Glasses of wine, 2 Standard drinks or equivalent per week     Family History   Problem Relation Age of Onset     HEART DISEASE Mother      CEREBROVASCULAR DISEASE Father        OBJECTIVE:                                                    There were no vitals taken for this visit.  There is no height or weight on file to calculate BMI.[DM1.1]   APPEARANCE: = Relaxed and in no distress  Conj/Eyelids = noninjected and lids and lashes are without inflammation  PERRLA/Irises = Pupils Round Reactive to Light and Irisis without inflammation  Ears/Nose = External structures and Nares have usual shape and form  Neck = No anterior or posterior adenopathy appreciated.  Thyroid = Not enlarged and no masses felt  Resp effort = Calm regular breathing  Breath Sounds = Good air movement with no rales or rhonchi in any lung fields  Heart Rate, Rythym, & sounds (no Murm)  = Regular rate and rythym with no S3, S4, or murmer appreciated.  Abdomen = Soft, nontender, no masses, & bowel sounds in all quadrants  Ext (edema) = No pretibial edema noted or elsewhere  Musculsktl =  Strength and ROM of major joints are within normal  limits  Mood/Affect = Cooperative and interested     ASS[SS1.1]ESSMENT/PLAN:[DM1.1]                                                      1. Dysuria[SS1.4]  Increase water intake[SS1.3]  cipro 250mg bid x 5 days  Culture pending  Results for orders placed or performed in visit on 06/07/18   Urinalysis w/reflex protein, bili ([facility])   Result Value Ref Range    Color Urine Yellow     pH Urine 5.5 5 - 9 pH    Specific Gravity Urine 1.025 1.005 - 1.025    Protein Urine 1+ (A) 0.01 mg/dL    Glucose Urine 0     Ketones Urine Trace (A)     Leukocyte Esterase Urine 3+ (A)     Blood Urine 2+ (A)     Nitrite Urine Pos (A) NEG    Bilirubin Urine Dip 2+ (A)     Urobilinogen Urine 1  (A) 0.2 - 1.0 EU/dL    WBC Urine Packed 0 - 3    RBC Urine Packed 0 - 3    Epithelial Cells Few     Crystal Urine 0     Bacteria Urine Many     Mucous Urine 0     Casts/LPF 0[SS1.5]      Results for orders placed or performed in visit on 06/07/18   Urinalysis w/reflex protein, bili ([facility])   Result Value Ref Range    Color Urine Yellow     pH Urine 5.5 5 - 9 pH    Specific Gravity Urine 1.025 1.005 - 1.025    Protein Urine 1+ (A) 0.01 mg/dL    Glucose Urine 0     Ketones Urine Trace (A)     Leukocyte Esterase Urine 3+ (A)     Blood Urine 2+ (A)     Nitrite Urine Pos (A) NEG    Bilirubin Urine Dip 2+ (A)     Urobilinogen Urine 1  (A) 0.2 - 1.0 EU/dL    WBC Urine Packed 0 - 3    RBC Urine Packed 0 - 3    Epithelial Cells Few     Crystal Urine 0     Bacteria Urine Many     Mucous Urine 0     Casts/LPF 0    Urine Culture  Routine ([facility])   Result Value Ref Range    Urine Culture Final report (A)     Result 1 Klebsiella pneumoniae (A)     Result 2 Comment (A)     Antimicrobial Susceptibility Comment     Narrative    Performed at:  01 - [facility] [address], [place], [place]  [id number]  : Noe Madrigal MD, Phone:  [phone][SS1.6]     2. Major depressive disorder, recurrent episode, moderate (H)[SS1.4]  Take vitamin D 2000  daily[SS1.3]  - venlafaxine (EFFEXOR-XR) 75 MG 24 hr capsule; Take 2 capsules (150 mg) by mouth daily   Dispense: 90 capsule; Refill: 3[SS1.4]      3. Chronic fatigue[SS1.7]  Get some exercise[SS1.8]  - VITAMIN B12 INJ /1000MCG  (** ADD QUANTITY **)  - THER/PROPH/DIAG INJ, SC/IM    4. Vitamin B12 deficiency (non anemic)  - VITAMIN B12 INJ /1000MCG  (** ADD QUANTITY **)  - THER/PROPH/DIAG INJ, SC/IM[SS1.7]    F/u prn[SS1.8]  The following health maintenance items are reviewed in Epic and correct as of today:  Health Maintenance   Topic Date Due     FALL RISK ASSESSMENT  01/03/2018     PHQ-9 Q6 MONTHS  06/14/2018     DEPRESSION ACTION PLAN Q1 YR  07/26/2018     TETANUS IMMUNIZATION (SYSTEM ASSIGNED)  07/27/2020     ADVANCE DIRECTIVE PLANNING Q5 YRS  11/10/2020     PNEUMOCOCCAL  Completed     INFLUENZA VACCINE  Completed       Fely Cleveland MD  Family Medicine    For any issues, please call my office  [facility] at [phone].[DM1.1]           Revision History        User Key Date/Time User Provider Type Action    > SS1.6 6/12/2018 10:22 AM Sharon Alonzo APRN CNP Nurse Practitioner Addend     SS1.7 6/7/2018 11:12 AM Sharon Alonzo APRN CNP Nurse Practitioner Sign     SS1.8 6/7/2018 11:10 AM Sharon Alonzo APRN CNP Nurse Practitioner      SS1.5 6/7/2018 10:45 AM Sharon Alonzo APRN CNP Nurse Practitioner      SS1.1 6/7/2018 10:31 AM Sharon Alonzo APRN CNP Nurse Practitioner      SS1.4 6/7/2018 10:28 AM Sharon Alonzo APRN CNP Nurse Practitioner      SS1.3 6/7/2018 10:27 AM Sharon Alonzo APRN CNP Nurse Practitioner      SS1.2 6/7/2018 10:15 AM Sharon Alonzo APRN CNP Nurse Practitioner      DM1.1 6/7/2018  7:23 AM Fely Cleveland MD Physician Sign at close encounter            Progress Notes by Jennifer Baumann LSW at 6/11/2018  1:23 PM     Author:  Jennifer Baumann LSW Service:  (none) Author Type:      Filed:  6/11/2018  1:27 PM  Encounter Date:  6/7/2018 Status:  Signed    :  Jennifer Baumann LSW ()           Clinic Care Coordination Contact    Situation: Patient chart reviewed by care coordinator.    Background: Referral received referral for resource navigation.     Assessment: Pt is now receiving care at [facility] with plans to transition to a TCU.     Plan/Recommendations: SW CC will continue to follow.    BRENDAN Gallagher  Clinic Care Coordinator  [phone]  [email or web address][AC1.1]           Revision History        User Key Date/Time User Provider Type Action    > AC1.1 6/11/2018  1:27 PM Jennifer Baumann LSW  Sign            Progress Notes by Jennifer Baumann LSW at 6/11/2018  1:23 PM     Author:  Jennifer Baumann LSW Service:  (none) Author Type:      Filed:  6/11/2018  1:27 PM Encounter Date:  6/7/2018 Status:  Signed    :  Jennifer Baumann LSW ()           Clinic Care Coordination Contact  [facility]/Voicemail       Clinical Data: Care Coordinator Outreach  Outreach attempted x 1.  Left message on voicemail with call back information and requested return call.  Plan:  Care Coordinator will try to reach patient again in 3-5 business days.    BRENDAN Gallagher  Clinic Care Coordinator  [phone]  [email or web address][AC1.1]           Revision History        User Key Date/Time User Provider Type Action    > AC1.1 6/11/2018  1:27 PM Jennifer Baumann LSW  Sign            Progress Notes by Arturo Jauregui DO at 6/11/2018  1:08 PM     Author:  Arturo Jauregui DO Service:  Hospitalist Author Type:  Physician    Filed:  6/11/2018  1:12 PM Date of Service:  6/11/2018  1:08 PM Creation Time:  6/11/2018  1:08 PM    Status:  Signed :  Arturo Jauregui DO (Physician)         [facility]  Hospitalist Progress Note  Arturo Jauregui DO 06/11/2018    Reason for Stay (Diagnosis): UTI         Assessment and Plan:     "  Summary of Stay: Tina Powell is a 85 year old female with a past medical history significant for stroke, hypertension, and possible dementia who was admitted on 6/8/2018 with urinary tract infection.  Problem List:   1. Urinary tract infection.  Continue IV ceftriaxone.  Urine culture from outside facility with Klebsiella and group B strep.  2. Acute encephalopathy.  With suspected underlying chronic dementia.  Acute issues likely due to UTI.  Slowly improving.  Also continue to hold alprazolam.  3. Previous stroke.  Continue clopidogrel and fenofibrate.  4. Hypertension.  Continue to hold hydrochlorothiazide.  IV hydralazine if needed.  5. GERD.  Omeprazole.  6. Depression.  Continue venlafaxine.  7. Chronic kidney disease.  Creatinine has been stable.  Avoid nephrotoxins as able.  8. Deconditioning.  Physical and Occupational Therapy consults.  DVT Prophylaxis: Pneumatic Compression Devices  Code Status: Full Code  Discharge Dispo: TCU.  Estimated Disch Date / # of Days until Disch: 2.        Interval History (Subjective):      Denies chest pain, shortness of breath, fevers, chills, nausea, vomiting, or diarrhea.                  Physical Exam:      Last Vital Signs:  /88 (BP Location: Left arm)  Pulse 72  Temp 98  F (36.7  C) (Oral)  Resp 18  Ht 1.676 m (5' 6\")  Wt 77.7 kg (171 lb 3.2 oz)  SpO2 93%  BMI 27.63 kg/m2    Gen:  NAD, A&Ox 1 to self and family.  Not oriented to place or time.  Eyes:  PERRL, sclera anicteric.  OP:  MMM, no lesions.  Neck:  Supple.  CV:  Regular, no murmurs.  Lung:  CTA b/l, normal effort.  Ab:  +BS, soft.  Skin:  Warm, dry to touch.  No rash.  Ext:  No pitting edema LE b/l.           Medications:      All current medications were reviewed with changes reflected in problem list.         Data:      All new lab and imaging data was reviewed.   Labs:[KR1.1]    Recent Labs  Lab 06/11/18  1012 06/10/18  0606   NA  --  141   POTASSIUM 4.0 3.6   CHLORIDE  --  107   CO2  " --  28   ANIONGAP  --  6   GLC  --  95   BUN  --  12   CR  --  0.89   GFRESTIMATED  --  60*   GFRESTBLACK  --  72   MICHELLE  --  8.6       Recent Labs  Lab 06/10/18  0606   WBC 7.6   HGB 12.5   HCT 39.1   MCV 85   [KR1.2]      Imaging:[KR1.1]   No results found for this or any previous visit (from the past 24 hour(s)).[KR1.2]       Revision History        User Key Date/Time User Provider Type Action    > KR1.2 6/11/2018  1:12 PM Arturo Jauregui DO Physician Sign     KR1.1 6/11/2018  1:08 PM Arturo Jauregui DO Physician             Progress Notes by Arturo Jauregui DO at 6/10/2018  3:16 PM     Author:  Arturo Jauregui DO Service:  Hospitalist Author Type:  Physician    Filed:  6/10/2018  3:25 PM Date of Service:  6/10/2018  3:16 PM Creation Time:  6/10/2018  3:16 PM    Status:  Signed :  Arturo Jauregui DO (Physician)         [facility]  Hospitalist Progress Note  Arturo Jauregui DO 06/10/2018    Reason for Stay (Diagnosis): UTI         Assessment and Plan:      Summary of Stay: Tina Powell is a 85 year old female with a past medical history significant for stroke, hypertension, and possible dementia who was admitted on 6/8/2018 with urinary tract infection.  Problem List:   1. Urinary tract infection.  Continue IV ceftriaxone.    2. Acute encephalopathy.  With suspected underlying chronic dementia.  Acute issues likely due to UTI.  Also continue to hold alprazolam.  3. Previous stroke.  Continue clopidogrel and restart fenofibrate.  4. Hypertension.  Hold hydrochlorothiazide.  IV hydralazine if needed.  5. GERD.  Omeprazole.  6. Depression.  Continue venlafaxine.  7. Chronic kidney disease.  Recheck metabolic panel tomorrow.  Avoid nephrotoxins as able.  8. Deconditioning.  Physical and Occupational Therapy consults.  DVT Prophylaxis: Pneumatic Compression Devices  Code Status: Full Code  Discharge Dispo: Assisted living versus TCU depending on progress  "by time of discharge.  Estimated Disch Date / # of Days until Disch: 1-2.        Interval History (Subjective):      Denies CP, SOB, F/C, N/V, or diarrhea.                  Physical Exam:      Last Vital Signs:  /79 (BP Location: Right arm)  Pulse 72  Temp 97.7  F (36.5  C) (Oral)  Resp 18  Ht 1.676 m (5' 6\")  Wt 77.7 kg (171 lb 3.2 oz)  SpO2 92%  BMI 27.63 kg/m2    Gen:  NAD, A&Ox1 to self only.  Not oriented to place or time.  Eyes:  PERRL, sclera anicteric.  OP:  MMM, no lesions.  Neck:  Supple.  CV:  Regular, no murmurs.  Lung:  CTA b/l, normal effort.  Ab:  +BS, soft.  Skin:  Warm, dry to touch.  No rash.  Ext:  No pitting edema LE b/l.           Medications:      All current medications were reviewed with changes reflected in problem list.         Data:      All new lab and imaging data was reviewed.   Labs:[KR1.1]    Recent Labs  Lab 06/10/18  0606      POTASSIUM 3.6   CHLORIDE 107   CO2 28   ANIONGAP 6   GLC 95   BUN 12   CR 0.89   GFRESTIMATED 60*   GFRESTBLACK 72   MICHELLE 8.6       Recent Labs  Lab 06/10/18  0606   WBC 7.6   HGB 12.5   HCT 39.1   MCV 85   [KR1.2]      Imaging:[KR1.1]   No results found for this or any previous visit (from the past 24 hour(s)).[KR1.2]       Revision History        User Key Date/Time User Provider Type Action    > KR1.2 6/10/2018  3:25 PM Arturo Jauregui, DO Physician Sign     KR1.1 6/10/2018  3:16 PM Arturo Jauregui, DO Physician                   Procedure Notes     No notes of this type exist for this encounter.         Progress Notes - Therapies (Notes from 06/10/18 through 06/13/18)      Progress Notes by Ashlyn Dowell at 6/12/2018 10:50 AM     Author:  Ashlyn Dowell Service:  (none) Author Type:  (none)    Filed:  6/12/2018 10:50 AM Encounter Date:  6/7/2018 Status:  Signed    :  Ashlyn Dowell           Results mailed to patient today  Ashlyn Dowell MA   Note: patient is in [facility] currently[DO1.1]   Revision History        User " Key Date/Time User Provider Type Action    > DO1.1 6/12/2018 10:50 AM Ashlyn Dowell (none) Sign

## 2018-06-08 NOTE — IP AVS SNAPSHOT
"    [place] [address] MEDICAL SURGICAL: [phone]                                              INTERAGENCY TRANSFER FORM - PHYSICIAN ORDERS   2018                    Hospital Admission Date: 2018  JAHAIRA STONE   : 10/7/1932  Sex: Female        Attending Provider: Moreno Carlson MD     Allergies:  Sulfa Drugs, Hmg-coa-r Inhibitors    Infection:  None   Service:  GENERAL MEDI    Ht:  1.676 m (5' 6\")   Wt:  75.4 kg (166 lb 4.8 oz)   Admission Wt:  74.8 kg (165 lb)    BMI:  26.84 kg/m 2   BSA:  1.87 m 2            Patient PCP Information     Provider PCP Type    Dian Tian MD General      ED Clinical Impression     Diagnosis Description Comment Added By Time Added    Confusion [R41.0] Confusion [R41.0]  Jany Fleming MD 2018  1:07 PM    Acute cystitis without hematuria [N30.00] Acute cystitis without hematuria [N30.00]  Jany Fleming MD 2018  1:08 PM      Hospital Problems as of 2018              Priority Class Noted POA    Encephalopathy Medium  2018 Yes      Non-Hospital Problems as of 2018              Priority Class Noted    Psoriasis Medium  2011    Dyslipidemia Medium  Unknown    Health Care Home Medium  2013    H/O: CVA (cerebrovascular accident) Medium  Unknown    Cerebral infarction (H) Medium  2013    Advance Care Planning Medium  2014    Risk for falls Medium  Unknown    Sepsis (H) Medium  2015    Major depressive disorder, recurrent episode, moderate (H) Medium  2016    Alcoholism (H) Medium  10/13/2016    Tobacco abuse Medium  10/13/2016      Code Status History     Date Active Date Inactive Code Status Order ID Comments User Context    2018 11:49 AM  Full Code [id number]  Arturo Jauregui DO Outpatient    2018  3:14 PM 2018 11:49 AM Full Code [id number]  Moreno Carlson MD Inpatient    2015  2:30 AM 8/10/2015  6:19 PM Full Code [id number]  Ryder Macedo MD " Inpatient    12/26/2013  5:40 PM 12/28/2013  1:19 PM Full Code [id number]  Rommel Velasco Inpatient         Medication Review      START taking        Dose / Directions Comments    cefuroxime 500 MG tablet   Commonly known as:  CEFTIN   Indication:  Urinary Tract Infection   Used for:  Acute cystitis without hematuria        Dose:  500 mg   Take 1 tablet (500 mg) by mouth every 12 hours for 2 days   Quantity:  4 tablet   Refills:  0          CONTINUE these medications which have NOT CHANGED        Dose / Directions Comments    acetaminophen 325 MG tablet   Commonly known as:  TYLENOL   Used for:  Sepsis (H)        Dose:  325 mg   Take 1 tablet (325 mg) by mouth every 6 hours as needed for mild pain   Quantity:  20 tablet   Refills:  0        cholecalciferol 1000 UNIT tablet   Commonly known as:  vitamin D3   Used for:  Low vitamin D level        Take  3 tablets daily.   Quantity:  100 tablet   Refills:  3        clobetasol 0.05 % ointment   Commonly known as:  TEMOVATE        Dose:  1 Application   Apply 1 Application topically daily as needed Applies 2-3 times a week   Refills:  1        clopidogrel 75 MG tablet   Commonly known as:  PLAVIX   Used for:  Encounter for medication refill        Dose:  75 mg   Take 1 tablet (75 mg) by mouth daily   Quantity:  90 tablet   Refills:  3        cyanocobalamin 1000 MCG/ML injection   Commonly known as:  VITAMIN B12        Dose:  1 mL   Inject 1 mL into the muscle every 30 days   Refills:  0        fenofibrate 48 MG tablet   Used for:  Hypercholesteremia        Dose:  48 mg   Take 1 tablet (48 mg) by mouth daily   Quantity:  90 tablet   Refills:  0    Pharmacy,  If 48 mg is not covered please see if 54 mg would be covered under insurance and let us know.  Thanks.       Magnesium 400 MG Caps   Used for:  Major depressive disorder, recurrent episode, moderate (H)        Dose:  1 tablet   Take 1 tablet by mouth daily   Refills:  0        omega 3 1000 MG Caps   Used for:  Mixed  hyperlipidemia        Take 3 grams daily   Quantity:  90 capsule   Refills:  0        omeprazole 20 MG CR capsule   Commonly known as:  priLOSEC   Used for:  Chronic cough, Dyspepsia        Dose:  20 mg   Take 1 capsule (20 mg) by mouth daily   Quantity:  30 capsule   Refills:  3        pyridoxine 50 MG Tabs   Commonly known as:  VITAMIN B-6   Used for:  Major depressive disorder, recurrent episode, moderate (H)        Dose:  100 mg   Take 100 mg by mouth daily   Refills:  0        venlafaxine 75 MG 24 hr capsule   Commonly known as:  EFFEXOR-XR   Used for:  Major depressive disorder, recurrent episode, moderate (H)        Dose:  150 mg   Take 2 capsules (150 mg) by mouth daily   Quantity:  90 capsule   Refills:  3          STOP taking     ALPRAZolam 0.5 MG tablet   Commonly known as:  XANAX           ciprofloxacin 250 MG tablet   Commonly known as:  CIPRO           Co Q 10 100 MG Caps           HUMIRA 20 MG/0.4ML Kit   Generic drug:  adalimumab           hydrochlorothiazide 12.5 MG Tabs tablet                   After Care     Activity - Up with nursing assistance           Advance Diet as Tolerated       Follow this diet upon discharge: Regular       General info for SNF       Length of Stay Estimate: Short Term Care: Estimated # of Days <30  Condition at Discharge: Improving  Level of care:skilled   Rehabilitation Potential: Good  Admission H&P remains valid and up-to-date: Yes  Recent Chemotherapy: N/A  Use Nursing Home Standing Orders: Yes       Mantoux instructions       Give two-step Mantoux (PPD) Per Facility Policy Yes             Referrals     Occupational Therapy Adult Consult       Evaluate and treat as clinically indicated.    Reason:  Weakness       Physical Therapy Adult Consult       Evaluate and treat as clinically indicated.    Reason:  Weakness             Follow-Up Appointment Instructions     Future Labs/Procedures    Follow Up and recommended labs and tests     Comments:    Follow up with  primary care provider in 1 week.  The following labs/tests are recommended: BMP and CBC in 7 days.  Follow up with Neurology in 3 weeks.      Follow-Up Appointment Instructions     Follow Up and recommended labs and tests       Follow up with primary care provider in 1 week.  The following labs/tests are recommended: BMP and CBC in 7 days.  Follow up with Neurology in 3 weeks.             Statement of Approval     Ordered          06/13/18 1149  I have reviewed and agree with all the recommendations and orders detailed in this document.  EFFECTIVE NOW     Approved and electronically signed by:  Arturo Jauregui, DO

## 2018-06-08 NOTE — IP AVS SNAPSHOT
` `     [place] [address] MEDICAL SURGICAL: [phone]            Medication Administration Report for Tina Powell as of 06/13/18 1207   Legend:    Given Hold Not Given Due Canceled Entry Other Actions    Time Time (Time) Time  Time-Action       Inactive    Active    Linked        Medications 06/07/18 06/08/18 06/09/18 06/10/18 06/11/18 06/12/18 06/13/18    acetaminophen (TYLENOL) tablet 650 mg  Dose: 650 mg  Freq: EVERY 4 HOURS PRN Route: PO  PRN Reason: mild pain  Start: 06/08/18 1514   Admin Instructions: Alternate ibuprofen (if ordered) with acetaminophen.  Maximum acetaminophen dose from all sources = 75 mg/kg/day not to exceed 4 grams/day.    Admin. Amount: 2 tablet (2 × 325 mg tablet)  Dispense Loc: [facility]               bisacodyl (DULCOLAX) Suppository 10 mg  Dose: 10 mg  Freq: DAILY PRN Route: RE  PRN Reason: constipation  Start: 06/08/18 1514   Admin Instructions: Hold for loose stools.  This is the third step of a three step constipation treatment.    Admin. Amount: 1 suppository (1 × 10 mg suppository)  Dispense Loc: [facility]                 Dose: 1 g  Freq: EVERY 24 HOURS Route: IV  Indications of Use: URINARY TRACT INFECTION  Last Dose: 1 g (06/11/18 1250)  Start: 06/09/18 1300   End: 06/13/18 1145   Admin. Amount: 1 g  Last Admin: 06/12/18 1144  Dispense Loc: [facility]  Infused Over: 15-30 Minutes  Volume: 10 mL       1302 (1 g)-New Bag        1257 (1 g)-New Bag        1250 (1 g)-New Bag        1144 (1 g)-New Bag               1145-Med Discontinued             cefuroxime (CEFTIN) tablet 500 mg  Dose: 500 mg  Freq: EVERY 12 HOURS SCHEDULED Route: PO  Indications of Use: URINARY TRACT INFECTION  Start: 06/13/18 1200   Admin. Amount: 2 tablet (2 × 250 mg tablet)           [ ] 1200       [ ] 2000           clopidogrel (PLAVIX) tablet 75 mg  Dose: 75 mg  Freq: DAILY Route: PO  Start: 06/09/18 0900   Admin. Amount: 1 tablet (1 × 75 mg tablet)  Last Admin: 06/13/18 0758  Dispense Loc:  [facility]       0828 (75 mg)-Given        0758 (75 mg)-Given               0825 (75 mg)-Given        0851 (75 mg)-Given        0758 (75 mg)-Given                  fenofibrate tablet 54 mg  Dose: 54 mg  Freq: DAILY Route: PO  Start: 06/09/18 1430   Admin Instructions: Auto sub fenofibrate 54 mg for 48 mg per formulary committee    Admin. Amount: 1 tablet (1 × 54 mg tablet)  Last Admin: 06/13/18 0758  Dispense Loc: [facility]       1612 (54 mg)-Given        0758 (54 mg)-Given               0825 (54 mg)-Given        0851 (54 mg)-Given        0758 (54 mg)-Given                  glucose gel 15-30 g  Dose: 15-30 g  Freq: EVERY 15 MIN PRN Route: PO  PRN Reason: low blood sugar  Start: 06/08/18 1525   Admin Instructions: Give 15 g for BG 51 to 69 mg/dL IF patient is conscious and able to swallow. Give 30 g for BG less than or equal to 50 mg/dL IF patient is conscious and able to swallow. Do NOT give glucose gel via enteral tube.  IF patient has enteral tube: give apple juice 120 mL (4 oz or 15 g of CHO) via enteral tube for BG 51 to 69 mg/dL.  Give apple juice 240 mL (8 oz or 30 g of CHO) via enteral tube for BG less than or equal to 50 mg/dL.    ~Oral gel is preferable for conscious and able to swallow patient.   ~IF gel unavailable or patient refuses may provide apple juice 120 mL (4 oz or 15 g of CHO). Document juice on I and O flowsheet.    Admin. Amount: 15-30 g  Dispense Loc: [facility]  Volume: 93.75 mL              Or  dextrose 50 % injection 25-50 mL  Dose: 25-50 mL  Freq: EVERY 15 MIN PRN Route: IV  PRN Reason: low blood sugar  Start: 06/08/18 1525   Admin Instructions: Use if have IV access, BG less than 70 mg/dL and meet dose criteria below:  Dose if conscious and alert (or disorientated) and NPO = 25 mL  Dose if unconscious / not alert = 50 mL  Vesicant. For ordered doses up to 25 g, give IV Push undiluted. Give each 5g over 1 minute.    Admin. Amount: 25-50 mL  Dispense Loc: [facility]  Infused Over: 1-5  Minutes  Volume: 50 mL              Or  glucagon injection 1 mg  Dose: 1 mg  Freq: EVERY 15 MIN PRN Route: SC  PRN Reason: low blood sugar  PRN Comment: May repeat x 1 only  Start: 06/08/18 1525   Admin Instructions: May give SQ or IM. ONLY use glucagon IF patient has NO IV access AND is UNABLE to swallow AND blood glucose is LESS than or EQUAL to 50 mg/dL.  If ordered IV, give IV Push over 1 minute. Reconstitute with 1mL sterile water.    Admin. Amount: 1 mg  Dispense Loc:  [facility]               hydrALAZINE (APRESOLINE) injection 10 mg  Dose: 10 mg  Freq: EVERY 4 HOURS PRN Route: IV  PRN Reason: high blood pressure  PRN Comment: give for SBP > 180  Start: 06/09/18 1416   Admin Instructions: For ordered doses up to 40 mg, give IV Push undiluted over 1 minute.    Admin. Amount: 10 mg = 0.5 mL Conc: 20 mg/mL  Dispense Loc:  [facility]  Volume: 0.5 mL               magnesium sulfate 4 g in 100 mL sterile water (premade)  Dose: 4 g  Freq: EVERY 4 HOURS PRN Route: IV  PRN Reason: magnesium supplementation  Start: 06/08/18 1514   Admin Instructions: For serum Mg++ less than 1.6 mg/dL  Give 4 g and recheck magnesium level 2 hours after dose, and next AM.    Admin. Amount: 4 g = 100 mL Conc: 4 g/100 mL  Dispense Loc:  Main Pharmacy  Infused Over: 120 Minutes  Volume: 100 mL               melatonin tablet 1 mg  Dose: 1 mg  Freq: AT BEDTIME PRN Route: PO  PRN Reason: sleep  Start: 06/08/18 1514   Admin Instructions: Do not give unless at least 6 hours of uninterrupted sleep is expected.    Admin. Amount: 1 tablet (1 × 1 mg tablet)  Last Admin: 06/08/18 1144  Dispense Loc:  [facility]      2359 (1 mg)-Given                naloxone (NARCAN) injection 0.1-0.4 mg  Dose: 0.1-0.4 mg  Freq: EVERY 2 MIN PRN Route: IV  PRN Reason: opioid reversal  Start: 06/08/18 1514   Admin Instructions: For respiratory rate LESS than or EQUAL to 8.  Partial reversal dose:  0.1 mg titrated q 2 minutes for Analgesia Side Effects Monitoring  Sedation Level of 3 (frequently drowsy, arousable, drifts to sleep during conversation).Full reversal dose:  0.4 mg bolus for Analgesia Side Effects Monitoring Sedation Level of 4 (somnolent, minimal or no response to stimulation).  For ordered doses up to 2mg give IVP. Give each 0.4mg over 15 seconds in emergency situations. For non-emergent situations further dilute in 9mL of NS to facilitate titration of response.    Admin. Amount: 0.1-0.4 mg = 0.25-1 mL Conc: 0.4 mg/mL  Dispense Loc: [facility]  Volume: 1 mL               omeprazole (priLOSEC) CR capsule 20 mg  Dose: 20 mg  Freq: DAILY Route: PO  Start: 06/09/18 0900   Admin. Amount: 1 capsule (1 × 20 mg capsule)  Last Admin: 06/13/18 0758  Dispense Loc: [facility]       0828 (20 mg)-Given        0759 (20 mg)-Given               0825 (20 mg)-Given        0851 (20 mg)-Given        0758 (20 mg)-Given                  ondansetron (ZOFRAN-ODT) ODT tab 4 mg  Dose: 4 mg  Freq: EVERY 6 HOURS PRN Route: PO  PRN Reasons: nausea,vomiting  Start: 06/08/18 1514   Admin Instructions: This is Step 1 of nausea and vomiting management.  If nausea not resolved in 15 minutes, go to Step 2 prochlorperazine (COMPAZINE). Do not push through foil backing. Peel back foil and gently remove. Place on tongue immediately. Administration with liquid unnecessary  With dry hands, peel back foil backing and gently remove tablet; do not push oral disintegrating tablet through foil backing; administer immediately on tongue and oral disintegrating tablet dissolves in seconds; then swallow with saliva; liquid not required.    Admin. Amount: 1 tablet (1 × 4 mg tablet)  Dispense Loc: [facility]              Or  ondansetron (ZOFRAN) injection 4 mg  Dose: 4 mg  Freq: EVERY 6 HOURS PRN Route: IV  PRN Reasons: nausea,vomiting  Start: 06/08/18 1514   Admin Instructions: This is Step 1 of nausea and vomiting management.  If nausea not resolved in 15 minutes, go to Step 2 prochlorperazine  (COMPAZINE).  Irritant. For ordered doses up to 4 mg, give IV Push undiluted over 2-5 minutes.    Admin. Amount: 4 mg = 2 mL Conc: 4 mg/2 mL  Dispense Loc:  [facility]  Infused Over: 2-5 Minutes  Volume: 2 mL               potassium chloride (KLOR-CON) Packet 20-40 mEq  Dose: 20-40 mEq  Freq: EVERY 2 HOURS PRN Route: ORAL OR FEED  PRN Reason: potassium supplementation  Start: 06/08/18 1514   Admin Instructions: Use if unable to tolerate tablets.  If Serum K+ 3.0-3.3, dose = 60 mEq po total dose (40 mEq x1 followed in 2 hours by 20 mEq x1). Recheck K+ level 4 hours after dose and the next AM.  If Serum K+ 2.5-2.9, dose = 80 mEq po total dose (40 mEq Q2H x2). Recheck K+ level 4 hours after dose and the next AM.  If Serum K+ less than 2.5, See IV order.  Dissolve packet contents in 4-8 ounces of cold water or juice.    Admin. Amount: 20-40 mEq  Dispense Loc:  [facility]               potassium chloride 10 mEq in 100 mL intermittent infusion with 10 mg lidocaine  Dose: 10 mEq  Freq: EVERY 1 HOUR PRN Route: IV  PRN Reason: potassium supplementation  Start: 06/08/18 1514   Admin Instructions: Infuse via PERIPHERAL LINE. Use potassium with lidocaine for pain with peripheral administration.  If Serum K+ 3.0-3.3, dose = 10 mEq/hr x4 doses (40 mEq IV total dose). Recheck K+ level 2 hours after dose and the next AM.  If Serum K+ less than 3.0, dose = 10 mEq/hr x6 doses (60 mEq IV total dose). Recheck K+ level 2 hours after dose and the next AM.    Admin. Amount: 10 mEq = 100 mL Conc: 10 mEq/100 mL  Dispense Loc:  [facility]  Infused Over: 1 Hours  Volume: 100 mL               potassium chloride 10 mEq in 100 mL sterile water intermittent infusion (premix)  Dose: 10 mEq  Freq: EVERY 1 HOUR PRN Route: IV  PRN Reason: potassium supplementation  Start: 06/08/18 1514   Admin Instructions: Infuse via PERIPHERAL LINE or CENTRAL LINE. Use for central line replacement if patient weight less than 65 kg, if patient is on TPN with  high potassium content or if unit does not stock 20 mEq bags.   If Serum K+ 3.0-3.3, dose = 10 mEq/hr x4 doses (40 mEq IV total dose). Recheck K+ level 2 hours after dose and the next AM.   If Serum K+ less than 3.0, dose = 10 mEq/hr x6 doses (60 mEq IV total dose). Recheck K+ level 2 hours after dose and the next AM.    Admin. Amount: 10 mEq = 100 mL Conc: 10 mEq/100 mL  Dispense Loc:  Main Pharmacy  Infused Over: 60 Minutes  Volume: 100 mL               potassium chloride 20 mEq in 50 mL intermittent infusion  Dose: 20 mEq  Freq: EVERY 1 HOUR PRN Route: IV  PRN Reason: potassium supplementation  Start: 06/08/18 1514   Admin Instructions: Infuse via CENTRAL LINE Only. May need EKG if less than 65 kg or on TPN - Max rate is 0.3 mEq/kg/hr for patients not on EKG monitoring.   If Serum K+ 3.0-3.3, dose = 20 mEq/hr x2 doses (40 mEq IV total dose). Recheck K+ level 2 hours after dose and the next AM.  If Serum K+ less than 3.0, dose = 20 mEq/hr x3 doses (60 mEq IV total dose). Recheck K+ level 2 hours after dose and the next AM.    Admin. Amount: 20 mEq = 50 mL Conc: 20 mEq/50 mL  Dispense Loc:  Main Pharmacy  Volume: 50 mL               potassium chloride SA (K-DUR/KLOR-CON M) CR tablet 20-40 mEq  Dose: 20-40 mEq  Freq: EVERY 2 HOURS PRN Route: PO  PRN Reason: potassium supplementation  Start: 06/08/18 1514   Admin Instructions: Use if able to take PO.   If Serum K+ 3.0-3.3, dose = 60 mEq po total dose (40 mEq x1 followed in 2 hours by 20 mEq x1). Recheck K+ level 4 hours after dose and the next AM.  If Serum K+ 2.5-2.9, dose = 80 mEq po total dose (40 mEq Q2H x2). Recheck K+ level 4 hours after dose and the next AM.  If Serum K+ less than 2.5, See IV order.  DO NOT CRUSH    Admin. Amount: 1-2 tablet (1-2 × 20 mEq tablet)  Dispense Loc: [facility]               senna-docusate (SENOKOT-S;PERICOLACE) 8.6-50 MG per tablet 1 tablet  Dose: 1 tablet  Freq: 2 TIMES DAILY PRN Route: PO  PRN Reason: constipation  Start:  18 151   Admin Instructions: If no bowel movement in 24 hours, increase to 2 tablets PO.  Hold for loose stools.  This is the first step of a three step constipation treatment.    Admin. Amount: 1 tablet  Dispense Loc:  [facility]              Or  senna-docusate (SENOKOT-S;PERICOLACE) 8.6-50 MG per tablet 2 tablet  Dose: 2 tablet  Freq: 2 TIMES DAILY PRN Route: PO  PRN Reason: constipation  Start: 18 151   Admin Instructions: Hold for loose stools.  This is the first step of a three step constipation treatment.    Admin. Amount: 2 tablet  Dispense Loc: [facility]               venlafaxine (EFFEXOR-ER) 24 hr tablet 150 mg  Dose: 150 mg  Freq: DAILY Route: PO  Start: 18 0900   Admin Instructions: DO NOT CRUSH.    Admin. Amount: 1 tablet (1 × 150 mg tablet)  Last Admin: 18  Dispense Loc:  [facility]       0828 (150 mg)-Given        0758 (150 mg)-Given               0825 (150 mg)-Given        0851 (150 mg)-Given        0758 (150 mg)-Given                 Completed Medications  Medications 06/07/18 06/08/18 06/09/18 06/10/18 06/11/18 06/12/18 06/13/18         Dose: 40 mg  Freq: ONCE Route: SC  Start: 18   End: 18   Admin Instructions: Patient to use home supply.- VERIFIED BY PHARMACY   [facility]    Admin. Amount: 40 mg = 0.8 mL Conc: 40 mg/0.8 mL  Last Admin: 18  Dispense Loc:  Main Pharmacy  Administrations Remainin  Volume: 0.8 mL         1659 (40 mg)-Given

## 2018-06-08 NOTE — PHARMACY-ADMISSION MEDICATION HISTORY
Admission medication history interview status for this patient is complete. See [facility] admission navigator for allergy information, prior to admission medications and immunization status.     Medication history interview source(s):Family(daughter)  Medication history resources (including written lists, pill bottles, clinic record):None  Primary pharmacy:[facility]    Changes made to PTA medication list:  Added: none  Deleted: none  Changed: none    Actions taken by pharmacist (provider contacted, etc):None     Additional medication history information:None    Medication reconciliation/reorder completed by provider prior to medication history? No    Do you take OTC medications (eg tylenol, ibuprofen, fish oil, eye/ear drops, etc)? Y(Y/N)    For patients on insulin therapy: N (Y/N)  Lantus/levemir/NPH/Mix 70/30 dose:   (Y/N) (see Med list for doses)   Sliding scale Novolog Y/N  If Yes, do you have a baseline novolog pre-meal dose:  units with meals  Patients eat three meals a day:   Y/N    How many episodes of hypoglycemia do you have per week: _______  How many missed doses do you have per week: ______  How many times do you check your blood glucose per day: _______   Any Barriers to therapy - Be specific :  cost of medications, comfortable with giving injections (if applicable), comfortable and confident with current diabetes regimen: Y/N ______________      Prior to Admission medications    Medication Sig Last Dose Taking? Auth Provider   acetaminophen (TYLENOL) 325 MG tablet Take 1 tablet (325 mg) by mouth every 6 hours as needed for mild pain  Yes Ivanna Luis MD   adalimumab (HUMIRA) 20 MG/0.4ML KIT Inject Subcutaneous every 14 days 5/24/2018 Yes Griffin Schuler   ALPRAZolam (XANAX) 0.5 MG tablet Take 0.5 mg by mouth as needed for anxiety   Yes Dian Tian MD   cholecalciferol (VITAMIN D) 1000 UNIT tablet Take  3 tablets daily. 6/8/2018 at Unknown time Yes Dian Tian,  MD   ciprofloxacin (CIPRO) 250 MG tablet Take 1 tablet (250 mg) by mouth 2 times daily for 5 days 6/8/2018 at am Yes Sharon Alonzo APRN CNP   clobetasol (TEMOVATE) 0.05 % ointment Apply 1 Application topically daily as needed Applies 2-3 times a week   Yes Reported, Patient   clopidogrel (PLAVIX) 75 MG tablet Take 1 tablet (75 mg) by mouth daily 6/8/2018 at Unknown time Yes Dian Tian MD   Coenzyme Q10 (CO Q 10) 100 MG CAPS Take 200 mg by mouth daily 6/8/2018 at Unknown time Yes Unknown, Entered By History   cyanocobalamin (VITAMIN B12) 1000 MCG/ML injection Inject 1 mL into the muscle every 30 days 6/7/2018 at Unknown time Yes Reported, Patient   hydrochlorothiazide 12.5 MG TABS tablet Take 1 tablet (12.5 mg) by mouth daily 6/8/2018 at Unknown time Yes Dian Tian MD   Magnesium 400 MG CAPS Take 1 tablet by mouth daily 6/8/2018 at Unknown time Yes Reported, Patient   omega 3 1000 MG CAPS Take 3 grams daily 6/8/2018 at Unknown time Yes Dian Tian MD   omeprazole (PRILOSEC) 20 MG CR capsule Take 1 capsule (20 mg) by mouth daily 6/8/2018 at Unknown time Yes Dian Tian MD   pyridoxine (VITAMIN B-6) 50 MG TABS Take 100 mg by mouth daily 6/8/2018 at Unknown time Yes Reported, Patient   venlafaxine (EFFEXOR-XR) 75 MG 24 hr capsule Take 2 capsules (150 mg) by mouth daily 6/8/2018 at Unknown time Yes Sharon Alonzo APRN CNP   fenofibrate 48 MG tablet Take 1 tablet (48 mg) by mouth daily   Dian Tian MD

## 2018-06-08 NOTE — IP AVS SNAPSHOT
` ` Patient Information     Patient Name Sex     Tina Powell ([id number]) Female 10/7/1932       [facility]      Patient Demographics     Address Phone    [address]  [facility] [address] [phone] (Home)  NONE (Work)  [phone] (Mobile)      Patient Ethnicity & Race     Ethnic Group Patient Race    American White      Emergency Contact(s)     Name Relation Home Work Mobile    Rosario Raymond Daughter [phone] NONE [phone]    Briana Reese Grandchild [phone]    Saroj Vargas Grandchild [phone] NONE [phone]    MELONY LINTON Daughter [phone] NONE [phone]      Documents on File        Status Date Received Description       Documents for the Patient    Consent for Services - [facility] Received () 11     Privacy Notice - [facility] Received () 11     Insurance Card Received () 11 Medica    Patient ID Received () 13 10    Insurance Card Received () 18 [facility]-MEDICARE    Insurance Card Received () 12 [facility]-MEDICA    Consent for Services - [facility]MG Received () 12 RMG-CON    Privacy Notice - [facility] Received 12 [facility]-PRIVACY    HIM ADA Authorization   12..ADA[facility]...C&M Info Spec    External Medication Information Consent Accepted () 05/15/12 RMG-EXT    Consent for Services - Hospital/Clinic Received ()  2012 ABN [facility]    Consent for EHR Access  13 Copied from existing Consent for services - [facility] collected on 2012    Consent for Services - [facility] Received () 07/10/13 RMG-CONSENT    Insurance Card Received () 07/10/13 [facility]-MEDICA    External Medication Information Consent Accepted 07/10/13 RMG-EXTERNAL    Privacy Notice - [place] Received 13     [facility] Specified Other       Consent for Services - Hospital/Clinic Received () 13     Insurance Card Received () 14 [facility]-MEDICA      Consent for Services - [facility]MG Received () 14 RMG-CONSENT    Insurance Card Received () 01/26/15 R[facility]-Medica    Consent for Services - Hospital/Clinic Received () 03/09/15     Advance Directives and Living Will Received 08/07/15 HEALTH CARE DIRECTIVE 2015    Advance Directives and Living Will Not Received  VALIDATION OF AD 2015    Advance Directives and Living Will Not Received  INVALID DIRECTIVE DATED 2014 RECEIVED    Consent for Services - [facility] Received () 09/02/15 RMG-CONSENT    Insurance Card Received () 16 [facility]-Medica    Insurance Card Received () 17 [facility]-MEDICA    Consent for Services/Privacy Notice - Hospital/Clinic Received 10/25/17     Consent for Services/Privacy Notice - Hospital/Clinic Received () 16     Consent for Services - [facility] Received () 16 RMG-CONSENT    Insurance Card Received () 16     Consent for Services - [facility] Received 10/03/17 RMG-CONSENT    Care Everywhere Prospective Auth Received 10/25/17     Patient ID       Insurance Card Received 10/25/17 BCBS    Insurance Card       Consent for Services - Hospital and Clinic Received 18     HIE Auth Received 18     Consent for Services - [facility] Received (Deleted) 12 MEDICA    HIM ADA Authorization  (Deleted)  ADA...2012.....[facility]    Advance Directives and Living Will Received (Deleted) 07     Advance Directives and Living Will Not Received (Deleted)  HEALTHCARE DIRECTIVE 14    Advance Directives and Living Will Not Received (Deleted)  VAILDATION OF AD 14    Advance Directives and Living Will Not Received (Deleted)  duplicate scan- to be deleted    Advance Directives and Living Will Not Received (Deleted)  duplicate- to be deleted       Documents for the Encounter    CMS IM for Patient Signature Received 18     Discharge Attachment   URINARY TRACT INFECTIONS (UTIS), UNDERSTANDING (ENGLISH)     Discharge Attachment   (S) UNDERSTANDING DELIRIUM (ENGLISH)    Discharge Attachment   ANTIBIOTICS, WHEN TO USE (ENGLISH)    CE Point of Care Auth Received      Discharge Attachment  (Deleted)  (S) CONCUSSION DISCHARGE INSTRUCTIONS (ENGLISH)      Admission Information     Attending Provider Admitting Provider Admission Type Admission Date/Time    Moreno Carlson MD Foehrenbacher, Matthew Henry, MD Emergency 06/08/18  1202    Discharge Date Hospital Service Auth/Cert Status Service Area     General Medicine [facility]    Unit Room/Bed Admission Status        3 MEDICAL SURGICAL 0311/0311-01 Admission (Confirmed)       Admission     Complaint    Encephalopathy      Hospital Account     Name Acct ID Class Status Primary Coverage    Tina Powell [id number] Inpatient Open MEDICARE - MEDICARE FOR HB SUPPLEMENT            Guarantor Account (for Hospital Account #[id number])     Name Relation to Pt Service Area Active? Acct Type    Tina Powell Self FCS Yes Personal/Family    Address Phone          [address]  [place] [address] [phone](H)  NONE(O)              Coverage Information (for Hospital Account #[id number])     1. MEDICARE/MEDICARE FOR HB SUPPLEMENT     F/O Payor/Plan Precert #    MEDICARE/MEDICARE FOR HB SUPPLEMENT     Subscriber Subscriber #    PowellTina knowles Yanna [id number]    Address Phone    [employer]  [address]  [place] [address] [phone]          2. BCBS/BCBS PLATINUM BLUE     F/O Payor/Plan Precert #    BCBS/BCBS PLATINUM BLUE     Subscriber Subscriber #    PowellSharifa knowlessteven Raines [id number]    Address Phone    [address] [address]  [place] [address] [phone]

## 2018-06-08 NOTE — ED TRIAGE NOTES
"Per daughter, pt today was complaining of vision troubles, took a nap, and woke up with altered mental status and confused.  Pt currently has UTI, per daughter, that is being treated with Cipro.  Pt reports she has a headache that is \"just irritating.\"  ABCs intact.  "

## 2018-06-08 NOTE — PROGRESS NOTES
Pt arrived to room  311 at shift change.  Daughter present.  Profile being completed by support staff.  BP (!) 149/103  Temp 97.6  F (36.4  C) (Oral)  Resp 18  Wt 74.8 kg (165 lb)  SpO2 95%  BMI 29.23 kg/m2.  No acute distress.  Has been pleasantly confused.  JAYNE backside at this time.  Skin intact on front, with BLE psoriasis.  Daughter states pt sees dermatology for it.  No open or weeping areas noted.  Denies pain.  Orders released and reviewed.  On coming shift updated.  Continue to monitor.

## 2018-06-08 NOTE — ED PROVIDER NOTES
"  History     Chief Complaint:  Altered Mental Status, Headache, and Eye Problem    HPI   Tina Powell is a 85 year old female, currently on Plavix, with history of previous CVA, hypertension, hyperlipidemia, possible dementia, amongst others as noted below, who presents with her daughter to the emergency department for evaluation of altered mental status, headache and eye problem, with recent diagnosis of UTI and currently being treated with Cipro, having taken three doses (two last night and one this morning). Daughter reports that patient was upset yesterday as she was not supposed to be smoking in her apartment, but also seemed a little confused, prompting daughter to present patient to her PCP and was found to have a UTI and started on Cipro. Daughter notes that patient also has had a poor appetite and daughter frequently has to present to patient's apartment, where she lives alone, to make sure patient has food. Daughter left patient last night and patient seemed baseline, but this morning, daughter was sitting next to patient when patient asked \"where did you go?\", had to physically turn to see the patient.  It is unclear if she had vision changes or just wasn't aware of her daughter. Patient finished breakfast and this seemed to resolve, but when daughter attempted to walk patient, but had an unsteady gait. Patient also seemed to be more confused asking \"where are we? What is all this?\" and endorsed a left sided headache.     Here in the ED, the patient is unable to report the month, year or current president. Daughter notes that patient is typically confused whenever she has a UTI. She denies patient experiencing any speech difficulty and patient denies any dysuria, abdominal pain or chest pain.     UA Results from Clinic Visit 6/7/18:  Component Value Flag Ref Range Units   Color Urine Yellow      pH Urine 5.5  5 - 9 pH   Specific Gravity Urine 1.025  1.005 - 1.025    Protein Urine 1+ (A) 0.01 " mg/dL   Glucose Urine 0      Ketones Urine Trace (A)     Leukocyte Esterase Urine 3+ (A)     Blood Urine 2+ (A)     Nitrite Urine Pos (A) NEG    Bilirubin Urine Dip 2+ (A)     Urobilinogen Urine 1  (A) 0.2 - 1.0 EU/dL   WBC Urine Packed  0 - 3    RBC Urine Packed  0 - 3    Epithelial Cells Few      Crystal Urine 0      Bacteria Urine Many      Mucous Urine 0      Casts/LPF 0        Allergies:  Sulfa drugs  Hmg-Coa-R inhibitors     Medications:    Tylenol  Humira  Xanax  Vitamin D  Cipro  Temovate  Plavix  CoQ  Vitamin B12  Fenofibrate  Hydrochlorothiazide  Magnesium  Omega  Omeprazole  Vitamin B6  Effexor-xr     Past Medical History:    Depressed   Dyslipidemia  CVA  Hypertension  Hyperlipidemia  Psoriases  Risk for falls  Urosepsis    Past Surgical History:    Hysterectomy - fibroids    Family History:    Heart disease  Cerebrovascular disease    Social History:  The patient was accompanied to the ED by daughter.  Smoking Status: Yes  Smokeless Tobacco: No  Alcohol Use: Yes   Marital Status:   [4]     Review of Systems   Reason unable to perform ROS: confusion.     Physical Exam   Vitals:  Patient Vitals for the past 24 hrs:   BP Temp Temp src Heart Rate Resp SpO2 Weight   06/08/18 1330 137/75 - - - - 92 % -   06/08/18 1323 - - - - - 93 % -   06/08/18 1322 (!) 145/95 - - - - - -   06/08/18 1300 148/87 - - - - - -   06/08/18 1230 142/74 - - - - - -   06/08/18 1214 - 97.6  F (36.4  C) Oral - - - 74.8 kg (165 lb)   06/08/18 1210 154/86 - - 73 18 93 % -      Physical Exam  Constitutional: Alert, inattentive, GCS 14 (E4, V4, M6), sitting in bed in no acute distress  HENT: normocephalic, atraumatic   Eyes: Normal conjunctiva. Pupils are equal, round, and reactive to light. No visual field cuts.    CV: regular rate and rhythm  Chest: Effort normal and breath sounds normal. No wheezing, rhonchi, or rales.   GI:  There is no tenderness to deep palpation. No distension. Normal bowel sounds  MSK: Normal range of  motion.   Neurological: Alert, oriented x1 (person only), strength 5/5 in all extremities, sensation intact, CN 3-12 intact, able to identify a pen   Skin: Skin is warm and dry.   Emergency Department Course     ECG:  ECG taken at 1211, ECG read at 1211 by Dr. Fleming  Normal sinus rhythm  Left axis deviation  Nonspecific T wave abnormality  Abnormal ECG  Increased HR artifact presentation  Rate 76 bpm. MN interval 178. QRS duration 112. QT/QTc 394/443. P-R-T axes 8 -61 91.     Imaging:  Radiology findings were communicated with the family who voiced understanding of the findings.  CT Head w/o Contrast:  IMPRESSION:  Diffuse cerebral volume loss and cerebral white matter  changes consistent with chronic small vessel ischemic disease. No  evidence for acute intracranial pathology.   Reading per radiology.     Laboratory:  Laboratory findings were communicated with the family who voiced understanding of the findings.  CBC: AWNL (WBC 10.5, HGB 14.2, )  BMP: GFR Estimate 53 (L) o/w WNL (Creatinine 1.00)  Glucose by Meter (Collected 1210): 68 (L)  Glucose by Meter (Collected 1300): 129 (H)    UA with Microscopic: Bacteria Few (A), Leukocyte Esterase Urine Trace (A), Mucous Urine Present (A), Amorphous Crystals Moderate (A) o/w WNL     Interventions:  1325 Rocephin 1 g IV     Emergency Department Course:  Nursing notes and vitals reviewed.  IV was inserted and blood was drawn for laboratory testing, results above.  The patient provided a urine sample here in the emergency department. This was sent for laboratory testing, findings above.  The patient was sent for a CT Head w/o Contrast while in the emergency department, results above.      12:08 PM: I performed an exam of the patient as documented above. History obtained from daughter.  12:57 PM: Updated daughter.   1:29 PM: I spoke with Dr. Carlson of the Hospitalist service regarding patient's presentation, findings, and plan of care. They have accepted patient  for further care.   2:11 PM: Updated daughter pertaining results.     I discussed the treatment plan with the patient. They expressed understanding of this plan and consented to admission. I discussed the patient with Dr. Carlson, who will admit the patient to a monitored bed for further evaluation and treatment.     I personally reviewed the laboratory results with the daughter and answered all related questions prior to admission.    Impression & Plan      Medical Decision Making:  Tina Powell is a 85 year old female who presents for evaluation of altered mental status.  She looks impaired here on initial exam and during course in ED the mental status has remained stable. She is disoriented and only knows her name, which is different from her baseline according to her daughter.  A broad differential was considered including infection, metabolic derangement, electrolyte abnormality (hyponatremia, etc), hypotension or shock, CVA, intracranial hemorrage or tumor, thyroid abnormality.  The most likely etiology of the altered mental state is UTI, given infected appearing UA yesterday.  UA today was obtained after Cipro was already started, and her urine culture from yesterday should be pending.  She has no focal neurologic deficits that would suggest new CVA.  She possibly had vision change earlier today, but she has no field cuts on my exam and she reports her vision appears at baseline.  Based on confusion would admit to medicine for further cares.  Interventions in the ED include starting ceftriaxone (had previously been started on PO Cipro yesterday). Detailed physical exam for occult sources of infection does not demonstrate any hidden infections like perirectal abscess or cellulitis, cellulitis, open wounds, etc.   Further cares per the inpatient team.  If mental status not improving with UTI treatment, she may need MRI brain.      Diagnosis:    ICD-10-CM    1. Confusion R41.0 UA with Microscopic      Glucose by meter     Glucose by meter   2. Acute cystitis without hematuria N30.00         Disposition:   Admission.    Scribe Disclosure:  I, Merly Tapia, am serving as a scribe at 12:08 PM on 6/8/2018 to document services personally performed by Jany Fleming MD, based on my observations and the provider's statements to me.  6/8/2018   [facility] EMERGENCY DEPARTMENT       Jany Fleming MD  06/08/18 2494

## 2018-06-08 NOTE — IP AVS SNAPSHOT
[place] [address] [facility]    [address]    [facility] [address]    Phone:  [phone]    Fax:  [phone]                                       After Visit Summary   6/8/2018    Tina Powell    MRN: [id number]           After Visit Summary Signature Page     I have received my discharge instructions, and my questions have been answered. I have discussed any challenges I see with this plan with the nurse or doctor.    ..........................................................................................................................................  Patient/Patient Representative Signature      ..........................................................................................................................................  Patient Representative Print Name and Relationship to Patient    ..................................................               ................................................  Date                                            Time    ..........................................................................................................................................  Reviewed by Signature/Title    ...................................................              ..............................................  Date                                                            Time

## 2018-06-08 NOTE — IP AVS SNAPSHOT
"` `           [place] [address] MEDICAL SURGICAL: [phone]                                              INTERAGENCY TRANSFER FORM - NURSING   2018                    Hospital Admission Date: 2018  JAHAIRA STONE   : 10/7/1932  Sex: Female        Attending Provider: Moreno Carlson MD     Allergies:  Sulfa Drugs, Hmg-coa-r Inhibitors    Infection:  None   Service:  GENERAL MEDI    Ht:  1.676 m (5' 6\")   Wt:  75.4 kg (166 lb 4.8 oz)   Admission Wt:  74.8 kg (165 lb)    BMI:  26.84 kg/m 2   BSA:  1.87 m 2            Patient PCP Information     Provider PCP Type    Dian Tian MD General      Current Code Status     Date Active Code Status Order ID Comments User Context       Prior      Code Status History     Date Active Date Inactive Code Status Order ID Comments User Context    2018 11:49 AM  Full Code [id number]  Arturo Jauregui,  Outpatient    2018  3:14 PM 2018 11:49 AM Full Code [id number]  Moreno Carlson MD Inpatient    2015  2:30 AM 8/10/2015  6:19 PM Full Code [id number]  Ryder Macedo MD Inpatient    2013  5:40 PM 2013  1:19 PM Full Code [id number]  Rommel Velasco Inpatient      Advance Directives        Scanned docmt in ACP Activity?           Yes, scanned ACP docmt        Hospital Problems as of 2018              Priority Class Noted POA    Encephalopathy Medium  2018 Yes      Non-Hospital Problems as of 2018              Priority Class Noted    Psoriasis Medium  2011    Dyslipidemia Medium  Unknown    Health Care Home Medium  2013    H/O: CVA (cerebrovascular accident) Medium  Unknown    Cerebral infarction (H) Medium  2013    Advance Care Planning Medium  2014    Risk for falls Medium  Unknown    Sepsis (H) Medium  2015    Major depressive disorder, recurrent episode, moderate (H) Medium  2016    Alcoholism (H) Medium  10/13/2016    Tobacco abuse Medium  10/13/2016    "   Immunizations     Name Date      Flu, Unspecified 10/28/04     Influenza (H1N1) 01/25/10     Influenza (High Dose) 3 valent vaccine 10/03/17     Influenza (High Dose) 3 valent vaccine 10/13/16     Influenza (High Dose) 3 valent vaccine 10/06/15     Influenza (IIV3) PF 10/14/13     Influenza (IIV3) PF 11/08/12     Influenza (IIV3) PF 10/05/11     Influenza (IIV3) PF 11/01/10     Influenza (IIV3) PF 09/30/09     Mantoux Tuberculin Skin Test 10/03/17     Mantoux Tuberculin Skin Test 11/15/16     Mantoux Tuberculin Skin Test 11/10/15     Mantoux Tuberculin Skin Test 09/10/14     Mantoux Tuberculin Skin Test 10/14/13     Mantoux Tuberculin Skin Test 05/15/12     Pneumo Conj 13-V (2010&after) 12/22/15     Pneumococcal 23 valent 07/27/10     TD (ADULT, 7+) 07/27/10     Zoster vaccine, live 10/12/07          END      ASSESSMENT     Discharge Profile Flowsheet     EXPECTED DISCHARGE     FINAL RESOURCES      Expected Discharge Date  06/13/18 (2.0 d > /apt) 06/12/18 5235   Resources List  Skilled Nursing Facility 06/11/18 1119    DISCHARGE NEEDS ASSESSMENT     Skilled Nursing Facility  [facility]) [phone], Fax: [phone] 06/13/18 1157    Concerns To Be Addressed  adjustment to diagnosis/illness concerns 12/22/15 1513   PAS Number  -- ([id number] 08/10/15) 08/10/15 1607    Patient/family verbalizes understanding of discharge plan recommendations?  Yes 06/11/18 1119   SKIN      Medical Team notified of plan?  yes 06/11/18 1119   Inspection of bony prominences  Full 06/13/18 0903    Anticipated Changes Related to Illness  none 06/11/18 1119   Full except areas not inspected   Buttock, left;Buttock, right;Sacrum;Coccyx 06/12/18 1908    Equipment Currently Used at Home  -- (life line ) 06/11/18 1119   Inspection under devices  Full 06/12/18 1908    Transportation Available  family or friend will provide 06/11/18 1119   Skin WDL  ex 06/13/18 0903    Primary Care MD Name  Diamond  "08/06/15 1532   Skin Color/Characteristics  bruised (ecchymotic) 06/13/18 0903    Equipment Used at Home  shower chair 08/07/15 0927   Skin Temperature  warm 06/13/18 0903    GASTROINTESTINAL (ADULT,PEDIATRIC,OB)     Skin Moisture  dry 06/13/18 0903    GI WDL  WDL 06/13/18 0903   Skin Elasticity  quick return to original state 06/13/18 0555    Abdominal Appearance  obese 06/13/18 0903   Skin Integrity  bruise(s);scab(s) 06/13/18 0903    Last Bowel Movement  06/12/18 06/13/18 0903   SAFETY      Passing flatus  yes 06/13/18 1004   Safety WDL  WDL 06/13/18 0903    COMMUNICATION ASSESSMENT     Safety Factors  ID band on;upper side rails raised x 2;call light in reach;wheels locked;bed in low position 06/13/18 0903    Patient's communication style  spoken language (English or Bilingual) 06/08/18 1208   All Alarms  alarm(s) activated and audible 06/13/18 0903                 Assessment WDL (Within Defined Limits) Definitions           Safety WDL     Effective: 09/28/15    Row Information: <b>WDL Definition:</b> Bed in low position, wheels locked; call light in reach; upper side rails up x 2; ID band on<br> <font color=\"gray\"><i>Item=AS safety wdl>>List=AS safety wdl>>Version=F14</i></font>      Skin WDL     Effective: 09/28/15    Row Information: <b>WDL Definition:</b> Warm; dry; intact; elastic; without discoloration; pressure points without redness<br> <font color=\"gray\"><i>Item=AS skin wdl>>List=AS skin wdl>>Version=F14</i></font>      Vitals     Vital Signs Flowsheet     VITAL SIGNS     Weight  75.4 kg (166 lb 4.8 oz) 06/13/18 0651    Temp  98.1  F (36.7  C) 06/13/18 0741   Weight Method  Bed scale 06/13/18 0651    Temp src  Oral 06/13/18 0741   Bed Scale  Standard (fitted sheet, draw sheet/ pad, cover/flat sheet, blanket, two pillows);Call light 06/10/18 0609    Resp  18 06/13/18 0741   ANNELISE COMA SCALE      Pulse  71 06/12/18 1107   Best Eye Response  4-->(E4) spontaneous 06/10/18 1549    Heart Rate  65 " "06/13/18 0741   Best Motor Response  6-->(M6) obeys commands 06/10/18 1549    Pulse/Heart Rate Source  Monitor 06/13/18 0741   Best Verbal Response  4-->(V4) confused 06/10/18 1549    BP  154/78 06/13/18 0741   Stephanie Coma Scale Score  14 06/10/18 1549    BP Location  Left arm 06/13/18 0741   DAILY CARE      OXYGEN THERAPY     Activity Management  activity adjusted per tolerance 06/13/18 0555    SpO2  94 % 06/13/18 0741   Activity Assistance Provided  assistance, 1 person 06/13/18 0555    O2 Device  None (Room air) 06/13/18 0741   Assistive Device Utilized  walker 06/13/18 0555    PAIN/COMFORT     POSITIONING      Patient Currently in Pain  denies 06/13/18 0741   Body Position  independently positioning 06/13/18 0903    Preferred Pain Scale  number (Numeric Rating Pain Scale) 06/10/18 0119   Head of Bed (HOB)  HOB at 15 degrees 06/13/18 0216    Patient's Stated Pain Goal  No pain 06/10/18 0119   Positioning/Transfer Devices  pillows;in use 06/12/18 2011    0-10 Pain Scale  0 06/12/18 1554   Chair  Upright in chair 06/12/18 1908    HEIGHT AND WEIGHT     POINT OF CARE TESTING      Height  1.676 m (5' 6\") 06/08/18 1525   Puncture Site  fingertip 06/09/18 1345    Height Method  Actual 06/08/18 1732   Bedside Glucose (mg/dl )   116 mg/dl 06/09/18 1345            Patient Lines/Drains/Airways Status    Active LINES/DRAINS/AIRWAYS     Name: Placement date: Placement time: Site: Days: Last dressing change:    Wound Left Elbow       Elbow                Patient Lines/Drains/Airways Status    Active PICC/CVC     None            Intake/Output Detail Report     Date Intake     Output    Shift P.O. I.V. IV Piggyback Total Total       Noc 06/11/18 2300 - 06/12/18 0659 -- -- -- -- -- 0    Day 06/12/18 0700 - 06/12/18 1459 640 -- -- 640 -- 640    Yenifer 06/12/18 1500 - 06/12/18 2259 200 -- -- 200 -- 200    Noc 06/12/18 2300 - 06/13/18 0659 -- -- -- -- -- 0    Day 06/13/18 0700 - 06/13/18 1459 -- -- -- -- -- 0      Last Void/BM  "      Most Recent Value    Urine Occurrence 1 at 06/13/2018 0913    Stool Occurrence 1 at 06/12/2018 1900      Case Management/Discharge Planning     Case Management/Discharge Planning Flowsheet     REFERRAL INFORMATION     Expected Discharge Date  06/13/18 (2.0 d > /apt) 06/12/18 0754    Did the Initial Social Work Assessment result in a Social Work Case?  Yes 06/11/18 1119   ASSESSMENT/CONCERNS TO BE ADDRESSED      Admission Type  inpatient 06/11/18 1119   Concerns To Be Addressed  adjustment to diagnosis/illness concerns 12/22/15 1513    Arrived From  home or self-care 06/11/18 1119   DISCHARGE PLANNING      Referral Source  physician 06/11/18 1119   Patient/family verbalizes understanding of discharge plan recommendations?  Yes 06/11/18 1119    Reason For Consult  discharge planning 06/11/18 1119   Medical Team notified of plan?  yes 06/11/18 1119    Record Reviewed  history and physical;medical record 06/11/18 1119   Anticipated Changes Related to Illness  none 06/11/18 1119    CTS Assigned to Case  Corinne  06/11/18 1119   Transportation Available  family or friend will provide 06/11/18 1119    Primary Care MD Name  Diamond 08/06/15 1532   Equipment Used at Home  shower chair 08/07/15 0927    LIVING ENVIRONMENT     FINAL RESOURCES      Lives With  alone 06/11/18 1119   Equipment Currently Used at Home  -- (life line ) 06/11/18 1119    Living Arrangements  apartment 06/11/18 1119   Resources List  Skilled Nursing Facility 06/11/18 1119    Provides Primary Care For  no one 06/11/18 1119   Skilled Nursing Facility  [facility]) [phone], Fax: [phone] 06/13/18 1151    HOME SAFETY     PAS Number  -- ([id number] 08/10/15) 08/10/15 1607    Patient Feels Safe Living in Home?  yes 06/11/18 1119   ABUSE RISK SCREEN      ASSESSMENT OF FAMILY/SOCIAL SUPPORT     QUESTION TO PATIENT:  Has a member of your family or a partner(now or in the past) intimidated, hurt,  manipulated, or controlled you in any way?  no 06/08/18 1214    Marital Status   06/11/18 1119   QUESTION TO PATIENT: Do you feel safe going back to the place where you are living?  yes 06/08/18 1214    Who is your support system?  Children 06/11/18 1119   OBSERVATION: Is there reason to believe there has been maltreatment of a vulnerable adult (ie. Physical/Sexual/Emotional abuse, self neglect, lack of adequate food, shelter, medical care, or financial exploitation)?  no 06/08/18 1214    Description of Support System  Supportive;Involved 06/11/18 1119   OTHER      Support Assessment  Adequate family and caregiver support 06/11/18 1119   Are you depressed or being treated for depression?  Yes 06/08/18 1523    COPING/STRESS     HOMICIDE RISK      Major Change/Loss/Stressor  denies 06/08/18 1523   Feels Like Hurting Others  no 06/08/18 1214    EXPECTED DISCHARGE

## 2018-06-08 NOTE — IP AVS SNAPSHOT
"[place] [address] MEDICAL SURGICAL: [phone]                                              INTERAGENCY TRANSFER FORM - LAB / IMAGING / EKG / EMG RESULTS   2018                    Hospital Admission Date: 2018  JAHAIRA STONE   : 10/7/1932  Sex: Female        Attending Provider: Moreno Carlson MD     Allergies:  Sulfa Drugs, Hmg-coa-r Inhibitors    Infection:  None   Service:  GENERAL MEDI    Ht:  1.676 m (5' 6\")   Wt:  75.4 kg (166 lb 4.8 oz)   Admission Wt:  74.8 kg (165 lb)    BMI:  26.84 kg/m 2   BSA:  1.87 m 2            Patient PCP Information     Provider PCP Type    Dian Tian MD General         Lab Results - 3 Days      Glucose by meter [[id number]]  Resulted: 18 0833, Result status: Final result    Ordering provider: Moreno Carlson MD  18 0745 Resulting lab: POINT OF CARE TEST, GLUCOSE    Specimen Information    Type Source Collected On     18 0745          Components       Value Reference Range Flag Lab   Glucose 91 70 - 99 mg/dL  170            Glucose by meter [[id number]]  Resulted: 18 0237, Result status: Final result    Ordering provider: Moreno Carlson MD  18 Resulting lab: POINT OF CARE TEST, GLUCOSE    Specimen Information    Type Source Collected On     18          Components       Value Reference Range Flag Lab   Glucose 96 70 - 99 mg/dL  170            Glucose by meter [[id number]]  Resulted: 18, Result status: Final result    Ordering provider: Moreno Carlson MD  18 Resulting lab: POINT OF CARE TEST, GLUCOSE    Specimen Information    Type Source Collected On     18          Components       Value Reference Range Flag Lab   Glucose 84 70 - 99 mg/dL  170            Glucose by meter [[id number]] (Abnormal)  Resulted: 18, Result status: Final result    Ordering provider: Moreno Carlson MD  18 180 " Resulting lab: POINT OF CARE TEST, GLUCOSE    Specimen Information    Type Source Collected On     06/12/18 1802          Components       Value Reference Range Flag Lab   Glucose 116 70 - 99 mg/dL H 170            Glucose by meter [[id number]]  Resulted: 06/12/18 1141, Result status: Final result    Ordering provider: Moreno Carlson MD  06/12/18 1113 Resulting lab: POINT OF CARE TEST, GLUCOSE    Specimen Information    Type Source Collected On     06/12/18 1113          Components       Value Reference Range Flag Lab   Glucose 92 70 - 99 mg/dL  170            Glucose by meter [[id number]]  Resulted: 06/12/18 0758, Result status: Final result    Ordering provider: Moreno Carlson MD  06/12/18 0742 Resulting lab: POINT OF CARE TEST, GLUCOSE    Specimen Information    Type Source Collected On     06/12/18 0742          Components       Value Reference Range Flag Lab   Glucose 95 70 - 99 mg/dL  170            Glucose by meter [[id number]] (Abnormal)  Resulted: 06/12/18 0204, Result status: Final result    Ordering provider: Moreno Carlson MD  06/12/18 0152 Resulting lab: POINT OF CARE TEST, GLUCOSE    Specimen Information    Type Source Collected On     06/12/18 0152          Components       Value Reference Range Flag Lab   Glucose 104 70 - 99 mg/dL H 170            Glucose by meter [[id number]] (Abnormal)  Resulted: 06/11/18 2221, Result status: Final result    Ordering provider: Moreno Carlson MD  06/11/18 2207 Resulting lab: POINT OF CARE TEST, GLUCOSE    Specimen Information    Type Source Collected On     06/11/18 2207          Components       Value Reference Range Flag Lab   Glucose 122 70 - 99 mg/dL H 170            Glucose by meter [[id number]]  Resulted: 06/11/18 1732, Result status: Final result    Ordering provider: Moreno Carlson MD  06/11/18 1721 Resulting lab: POINT OF CARE TEST, GLUCOSE    Specimen Information    Type Source Collected  On     06/11/18 1721          Components       Value Reference Range Flag Lab   Glucose 95 70 - 99 mg/dL  170            Magnesium [[id number]] (Abnormal)  Resulted: 06/11/18 1108, Result status: Final result    Ordering provider: Arturo Jauregui, DO  06/11/18 0817 Resulting lab: [facility]    Specimen Information    Type Source Collected On   Blood  06/11/18 1012          Components       Value Reference Range Flag Lab   Magnesium 2.4 1.6 - 2.3 mg/dL H FrRdHs            Potassium [[id number]]  Resulted: 06/11/18 1108, Result status: Final result    Ordering provider: Arturo Jauregui, DO  06/11/18 0817 Resulting lab: [facility]    Specimen Information    Type Source Collected On   Blood  06/11/18 1012          Components       Value Reference Range Flag Lab   Potassium 4.0 3.4 - 5.3 mmol/L  FrRd            Glucose by meter [[id number]] (Abnormal)  Resulted: 06/11/18 0910, Result status: Final result    Ordering provider: Moreno Carlson MD  06/11/18 0851 Resulting lab: POINT OF CARE TEST, GLUCOSE    Specimen Information    Type Source Collected On     06/11/18 0851          Components       Value Reference Range Flag Lab   Glucose 108 70 - 99 mg/dL H 170            Glucose by meter [[id number]]  Resulted: 06/10/18 1820, Result status: Final result    Ordering provider: Moreno Carlson MD  06/10/18 1805 Resulting lab: POINT OF CARE TEST, GLUCOSE    Specimen Information    Type Source Collected On     06/10/18 1805          Components       Value Reference Range Flag Lab   Glucose 88 70 - 99 mg/dL  170            Urine Culture  Routine ([facility]) [[id number]] (Abnormal)  Resulted: 06/10/18 1606, Result status: Final result    Ordering provider: Sharon Alonzo APRN CNP  06/07/18 1040 Resulting lab: [facility]    Narrative:       Performed at:  01 - [facility] [address], [place], [place]  [id number]  : Noe Madrigal MD, Phone:  [phone]     Specimen Information    Type Source Collected On   Urine  06/07/18 1134          Components       Value Reference Range Flag Lab   Urine Culture Final report  A 01   Result 1 Klebsiella pneumoniae  A 01   Comment:         Greater than 100,000 colony forming units per mL  Cefazolin <=4 ug/mL  Cefazolin with an NATA <=16 predicts susceptibility to the oral agents  cefaclor, cefdinir, cefpodoxime, cefprozil, cefuroxime, cephalexin,  and loracarbef when used for therapy of uncomplicated urinary tract  infections due to E. coli, Klebsiella pneumoniae, and Proteus  mirabilis.     Result 2 Comment  A 01   Comment:         Beta hemolytic Streptococcus, group B  50,000-100,000 colony forming units per mL  Penicillin and ampicillin are drugs of choice for treatment of  beta-hemolytic streptococcal infections. Susceptibility testing of  penicillins and other beta-lactam agents approved by the FDA for  treatment of beta-hemolytic streptococcal infections need not be  performed routinely because nonsusceptible isolates are extremely  rare in any beta-hemolytic streptococcus and have not been reported  for Streptococcus pyogenes (group A). (CLSI 2011)     Antimicrobial Susceptibility Comment   01   Comment:               ** S = Susceptible; I = Intermediate; R = Resistant **                     P = Positive; N = Negative              MICS are expressed in micrograms per mL     Antibiotic                 RSLT#1    RSLT#2    RSLT#3    RSLT#4  Amoxicillin/Clavulanic Acid    S  Ampicillin                     R  Cefepime                       S  Ceftriaxone                    S  Cefuroxime                     S  Cephalothin                    S  Ciprofloxacin                  S  Ertapenem                      S  Gentamicin                     S  Imipenem                       S  Levofloxacin                   S  Nitrofurantoin                 S  Piperacillin                   R  Tetracycline                   S  Tobramycin                      S  Trimethoprim/Sulfa             S              Glucose by meter [[id number]]  Resulted: 06/10/18 0814, Result status: Final result    Ordering provider: Moreno Carlson MD  06/10/18 0802 Resulting lab: POINT OF CARE TEST, GLUCOSE    Specimen Information    Type Source Collected On     06/10/18 0802          Components       Value Reference Range Flag Lab   Glucose 98 70 - 99 mg/dL  170            Comprehensive metabolic panel [[id number]] (Abnormal)  Resulted: 06/10/18 0640, Result status: Final result    Ordering provider: Arturo Jauregui,   06/10/18 0000 Resulting lab: [facility]    Specimen Information    Type Source Collected On   Blood  06/10/18 0606          Components       Value Reference Range Flag Lab   Sodium 141 133 - 144 mmol/L  FrRdHs   Potassium 3.6 3.4 - 5.3 mmol/L  FrRdHs   Chloride 107 94 - 109 mmol/L  FrRdHs   Carbon Dioxide 28 20 - 32 mmol/L  FrRdHs   Anion Gap 6 3 - 14 mmol/L  FrRdHs   Glucose 95 70 - 99 mg/dL  FrRdHs   Urea Nitrogen 12 7 - 30 mg/dL  FrRdHs   Creatinine 0.89 0.52 - 1.04 mg/dL  FrRdHs   GFR Estimate 60 >60 mL/min/1.7m2 L FrRdHs   Comment:  Non  GFR Calc   GFR Estimate If Black 72 >60 mL/min/1.7m2  FrRdHs   Comment:  African American GFR Calc   Calcium 8.6 8.5 - 10.1 mg/dL  FrRdHs   Bilirubin Total 0.9 0.2 - 1.3 mg/dL  FrRdHs   Albumin 3.0 3.4 - 5.0 g/dL L FrRdHs   Protein Total 6.4 6.8 - 8.8 g/dL L FrRdHs   Alkaline Phosphatase 42 40 - 150 U/L  FrRdHs   ALT 27 0 - 50 U/L  FrRdHs   AST 19 0 - 45 U/L  FrRdHs            CBC with platelets [[id number]]  Resulted: 06/10/18 0623, Result status: Final result    Ordering provider: Arturo Jauregui,   06/10/18 0000 Resulting lab: [facility]    Specimen Information    Type Source Collected On   Blood  06/10/18 0606          Components       Value Reference Range Flag Lab   WBC 7.6 4.0 - 11.0 10e9/L  FrRdHs   RBC Count 4.62 3.8 - 5.2 10e12/L  FrRd   Hemoglobin  12.5 11.7 - 15.7 g/dL  FrRdHs   Hematocrit 39.1 35.0 - 47.0 %  FrRdHs   MCV 85 78 - 100 fl  FrRdHs   MCH 27.1 26.5 - 33.0 pg  FrRdHs   MCHC 32.0 31.5 - 36.5 g/dL  FrRdHs   RDW 14.4 10.0 - 15.0 %  FrRdHs   Platelet Count 285 150 - 450 10e9/L  FrRdHs            Testing Performed By     Lab - Abbreviation Name Director Address Valid Date Range    12 - FrRdHs [facility] Unknown [address] [facility] [address] 05/08/15 1057 - Present    142 - Unknown [facility] Unknown Unknown 12/29/10 1424 - Present    152 - 01 [facility] 01 Unknown Unknown 04/29/11 1340 - Present    170 - Unknown POINT OF CARE TEST, GLUCOSE Unknown Unknown 10/31/11 1114 - Present            Unresulted Labs     None      Encounter-Level Documents:     There are no encounter-level documents.      Order-Level Documents:     There are no order-level documents.

## 2018-06-08 NOTE — IP AVS SNAPSHOT
MRN:[id number]                      After Visit Summary   6/8/2018    Tina Powell    MRN: [id number]           Thank you!     Thank you for choosing [facility] for your care. Our goal is always to provide you with excellent care. Hearing back from our patients is one way we can continue to improve our services. Please take a few minutes to complete the written survey that you may receive in the mail after you visit. If you would like to speak to someone directly about your visit please contact Patient Relations at [phone]. Thank you!          Patient Information     Date Of Birth          10/7/1932        Designated Caregiver       Most Recent Value    Caregiver    Will someone help with your care after discharge? yes    Name of designated caregiver Brittanie    Phone number of caregiver [phone]    Caregiver address On file      About your hospital stay     You were admitted on:  June 8, 2018 You last received care in the:  [place] [address] [facility]    You were discharged on:  June 13, 2018       Who to Call     For medical emergencies, please call 911.  For non-urgent questions about your medical care, please call your primary care provider or clinic, [phone]          Attending Provider     Provider Specialty    [facility]    [facility], Moreno Lange MD Internal Medicine       Primary Care Provider Office Phone # Fax #    Dian Janette Tian [phone]      After Care Instructions     Activity - Up with nursing assistance           Advance Diet as Tolerated       Follow this diet upon discharge: Regular            General info for SNF       Length of Stay Estimate: Short Term Care: Estimated # of Days <30  Condition at Discharge: Improving  Level of care:skilled   Rehabilitation Potential: Good  Admission H&P remains valid and up-to-date: Yes  Recent Chemotherapy: N/A  Use Nursing Home Standing  "Orders: Yes            Mantoux instructions       Give two-step Mantoux (PPD) Per Facility Policy Yes                  Follow-up Appointments     Follow Up and recommended labs and tests       Follow up with primary care provider in 1 week.  The following labs/tests are recommended: BMP and CBC in 7 days.  Follow up with Neurology in 3 weeks.                  Additional Services     Occupational Therapy Adult Consult       Evaluate and treat as clinically indicated.    Reason:  Weakness            Physical Therapy Adult Consult       Evaluate and treat as clinically indicated.    Reason:  Weakness                  Pending Results     No orders found from 6/6/2018 to 6/9/2018.            Statement of Approval     Ordered          06/13/18 1149  I have reviewed and agree with all the recommendations and orders detailed in this document.  EFFECTIVE NOW     Approved and electronically signed by:  Arturo Jauregui DO             Admission Information     Date & Time Provider Department Dept. Phone    6/8/2018 Moreno Carlson MD [place] [address] [facility] [phone]      Your Vitals Were     Blood Pressure Pulse Temperature Respirations Height Weight    154/78 (BP Location: Left arm) 71 98.1  F (36.7  C) (Oral) 18 1.676 m (5' 6\") 75.4 kg (166 lb 4.8 oz)    Pulse Oximetry BMI (Body Mass Index)                94% 26.84 kg/m2          Care EveryWhere ID     This is your Care EveryWhere ID. This could be used by other organizations to access your [facility] medical records  [id number]        Equal Access to Services     JOSE CAMP AH: Hadii aad ku hadasho Somahin, waaxda luqadaha, qaybta kaalmada adeegyada, cele owen. So [facility] [phone].    ATENCIÓN: Si habla español, tiene a marquez disposición servicios gratuitos de asistencia lingüística. Llame al [phone].    We comply with applicable federal civil rights laws and [place] laws. We do not discriminate on the " basis of race, color, national origin, age, disability, sex, sexual orientation, or gender identity.               Review of your medicines      START taking        Dose / Directions    cefuroxime 500 MG tablet   Commonly known as:  CEFTIN   Indication:  Urinary Tract Infection   Used for:  Acute cystitis without hematuria        Dose:  500 mg   Take 1 tablet (500 mg) by mouth every 12 hours for 2 days   Quantity:  4 tablet   Refills:  0         CONTINUE these medicines which have NOT CHANGED        Dose / Directions    acetaminophen 325 MG tablet   Commonly known as:  TYLENOL   Used for:  Sepsis (H)        Dose:  325 mg   Take 1 tablet (325 mg) by mouth every 6 hours as needed for mild pain   Quantity:  20 tablet   Refills:  0       cholecalciferol 1000 UNIT tablet   Commonly known as:  vitamin D3   Used for:  Low vitamin D level        Take  3 tablets daily.   Quantity:  100 tablet   Refills:  3       clobetasol 0.05 % ointment   Commonly known as:  TEMOVATE        Dose:  1 Application   Apply 1 Application topically daily as needed Applies 2-3 times a week   Refills:  1       clopidogrel 75 MG tablet   Commonly known as:  PLAVIX   Used for:  Encounter for medication refill        Dose:  75 mg   Take 1 tablet (75 mg) by mouth daily   Quantity:  90 tablet   Refills:  3       cyanocobalamin 1000 MCG/ML injection   Commonly known as:  VITAMIN B12        Dose:  1 mL   Inject 1 mL into the muscle every 30 days   Refills:  0       fenofibrate 48 MG tablet   Used for:  Hypercholesteremia        Dose:  48 mg   Take 1 tablet (48 mg) by mouth daily   Quantity:  90 tablet   Refills:  0       Magnesium 400 MG Caps   Used for:  Major depressive disorder, recurrent episode, moderate (H)        Dose:  1 tablet   Take 1 tablet by mouth daily   Refills:  0       omega 3 1000 MG Caps   Used for:  Mixed hyperlipidemia        Take 3 grams daily   Quantity:  90 capsule   Refills:  0       omeprazole 20 MG CR capsule   Commonly known  as:  priLOSEC   Used for:  Chronic cough, Dyspepsia        Dose:  20 mg   Take 1 capsule (20 mg) by mouth daily   Quantity:  30 capsule   Refills:  3       pyridoxine 50 MG Tabs   Commonly known as:  VITAMIN B-6   Used for:  Major depressive disorder, recurrent episode, moderate (H)        Dose:  100 mg   Take 100 mg by mouth daily   Refills:  0       venlafaxine 75 MG 24 hr capsule   Commonly known as:  EFFEXOR-XR   Used for:  Major depressive disorder, recurrent episode, moderate (H)        Dose:  150 mg   Take 2 capsules (150 mg) by mouth daily   Quantity:  90 capsule   Refills:  3         STOP taking     ALPRAZolam 0.5 MG tablet   Commonly known as:  XANAX           ciprofloxacin 250 MG tablet   Commonly known as:  CIPRO           Co Q 10 100 MG Caps           HUMIRA 20 MG/0.4ML Kit   Generic drug:  adalimumab           hydrochlorothiazide 12.5 MG Tabs tablet                Where to get your medicines      These medications were sent to [facility]/pharmacy [address] [place], [place] - [address]  [id number], [place] [address]     Phone:  [phone]     omeprazole 20 MG CR capsule         Some of these will need a paper prescription and others can be bought over the counter. Ask your nurse if you have questions.     You don't need a prescription for these medications     cefuroxime 500 MG tablet                Protect others around you: Learn how to safely use, store and throw away your medicines at www.disposemymeds.org.        ANTIBIOTIC INSTRUCTION     You've Been Prescribed an Antibiotic - Now What?  Your healthcare team thinks that you or your loved one might have an infection. Some infections can be treated with antibiotics, which are powerful, life-saving drugs. Like all medications, antibiotics have side effects and should only be used when necessary. There are some important things you should know about your antibiotic treatment.      Your healthcare team may run tests before you start  taking an antibiotic.    Your team may take samples (e.g., from your blood, urine or other areas) to run tests to look for bacteria. These test can be important to determine if you need an antibiotic at all and, if you do, which antibiotic will work best.      Within a few days, your healthcare team might change or even stop your antibiotic.    Your team may start you on an antibiotic while they are working to find out what is making you sick.    Your team might change your antibiotic because test results show that a different antibiotic would be better to treat your infection.    In some cases, once your team has more information, they learn that you do not need an antibiotic at all. They may find out that you don't have an infection, or that the antibiotic you're taking won't work against your infection. For example, an infection caused by a virus can't be treated with antibiotics. Staying on an antibiotic when you don't need it is more likely to be harmful than helpful.      You may experience side effects from your antibiotic.    Like all medications, antibiotics have side effects. Some of these can be serious.    Let you healthcare team know if you have any known allergies when you are admitted to the hospital.    One significant side effect of nearly all antibiotics is the risk of severe and sometimes deadly diarrhea caused by Clostridium difficile (C. Difficile). This occurs when a person takes antibiotics because some good germs are destroyed. Antibiotic use allows C. diificile to take over, putting patients at high risk for this serious infection.    As a patient or caregiver, it is important to understand your or your loved one's antibiotic treatment. It is especially important for caregivers to speak up when patients can't speak for themselves. Here are some important questions to ask your healthcare team.    What infection is this antibiotic treating and how do you know I have that infection?    What  side effects might occur from this antibiotic?    How long will I need to take this antibiotic?    Is it safe to take this antibiotic with other medications or supplements (e.g., vitamins) that I am taking?     Are there any special directions I need to know about taking this antibiotic? For example, should I take it with food?    How will I be monitored to know whether my infection is responding to the antibiotic?    What tests may help to make sure the right antibiotic is prescribed for me?      Information provided by:  www.cdc.gov/getsmart  U.S. Department of Health and Human Services  Centers for disease Control and Prevention  National Center for Emerging and Zoonotic Infectious Diseases  Division of Healthcare Quality Promotion             Medication List: This is a list of all your medications and when to take them. Check marks below indicate your daily home schedule. Keep this list as a reference.      Medications           Morning Afternoon Evening Bedtime As Needed    acetaminophen 325 MG tablet   Commonly known as:  TYLENOL   Take 1 tablet (325 mg) by mouth every 6 hours as needed for mild pain                                cefuroxime 500 MG tablet   Commonly known as:  CEFTIN   Take 1 tablet (500 mg) by mouth every 12 hours for 2 days                                cholecalciferol 1000 UNIT tablet   Commonly known as:  vitamin D3   Take  3 tablets daily.                                clobetasol 0.05 % ointment   Commonly known as:  TEMOVATE   Apply 1 Application topically daily as needed Applies 2-3 times a week                                clopidogrel 75 MG tablet   Commonly known as:  PLAVIX   Take 1 tablet (75 mg) by mouth daily   Last time this was given:  75 mg on 6/13/2018  7:58 AM                                cyanocobalamin 1000 MCG/ML injection   Commonly known as:  VITAMIN B12   Inject 1 mL into the muscle every 30 days                                fenofibrate 48 MG tablet   Take 1  tablet (48 mg) by mouth daily   Last time this was given:  54 mg on 6/13/2018  7:58 AM                                Magnesium 400 MG Caps   Take 1 tablet by mouth daily                                omega 3 1000 MG Caps   Take 3 grams daily                                omeprazole 20 MG CR capsule   Commonly known as:  priLOSEC   Take 1 capsule (20 mg) by mouth daily   Last time this was given:  20 mg on 6/13/2018  7:58 AM                                pyridoxine 50 MG Tabs   Commonly known as:  VITAMIN B-6   Take 100 mg by mouth daily                                venlafaxine 75 MG 24 hr capsule   Commonly known as:  EFFEXOR-XR   Take 2 capsules (150 mg) by mouth daily                                          More Information        Understanding Urinary Tract Infections (UTIs)  Most UTIs are caused by bacteria, although they may also be caused by viruses or fungi. Bacteria from the bowel are the most common source of infection. The infection may start because of any of the following:    Sexual activity. During sex, bacteria can travel from the penis, vagina, or rectum into the urethra.     Bacteria on the skin outside the rectum may travel into the urethra. This is more common in women since the rectum and urethra are closer to each other than in men. Wiping from front to back after using the toilet and keeping the area clean can help prevent germs from getting to the urethra.    Blockage of urine flow through the urinary tract. If urine sits too long, germs may start to grow out of control.      Parts of the urinary tract  The infection can occur in any part of the urinary tract.    The kidneys collect and store urine.    The ureters carry urine from the kidneys to the bladder.    The bladder holds urine until you are ready to let it out.    The urethra carries urine from the bladder out of the body. It is shorter in women, so bacteria can move through it more easily. The urethra is longer in men, so a UTI  is less likely to reach the bladder or kidneys in men.  Date Last Reviewed: 1/1/2017 2000-2017 [employer]. [address] [place] [address], [place] [address]. All rights reserved. This information is not intended as a substitute for professional medical care. Always follow your healthcare professional's instructions.              How to Prevent and Treat Delirium  What is delirium?   Delirium is confusion that comes on quickly and lasts for hours or weeks. It may get better or worse throughout the day. People with delirium:    Can't think clearly, focus or understand language    Can't remember what just happened    Don't know what is going on around them    See or hear things that aren't there    Feel anxious, fearful or restless    Have sleep problems    May seem unusually quiet or withdrawn  Is delirium the same as dementia?   No. Dementia comes on slowly, over months or years. In most cases, it is lasting.   Delirium usually clears up after a few days or weeks.   What causes it?   Delirium is caused by:    Certain medicines, such as: antihistamines (Benadryl); benzodiazepines (Ativan, Valium) and anticholinergics (prochlorperazine)    High-risk medicines:  ? narcotics (morphine)  ? sedatives and hypnotics (Ambien, Restoril)  ? tricyclics (Elavil or amitriptyline)  ? hydroxyzine (Vistaril)  ? Lithium or Meclizine    Illegal drugs and poisons; withdrawal from drugs or alcohol    Illness (diabetes) or infection    Mental illness    Medical treatment (surgery)    Severe pain    Lack of sleep for a long time    Dehydration or constipation  How is delirium prevented and treated?  Our goal is to prevent delirium while in the hospital. We try to do this by:  1. Avoiding high-risk medicines or prescribing a lower dose.  2. Giving patients their glasses, hearing aids and dentures.  3. Managing pain when needed.  4. Promoting physical activity, even while in the hospital. Patients should walk or sit up in the chair  during daylight hours.  5. Preventing dehydration.  6. Screening high-risk patients for early signs of delirium.  It is not always possible to prevent delirium. If your loved one has delirium, we will try to find out what is causing it. We may need to change medicines or treat an infection.  What can I do to help my loved one?   If your loved one has delirium, please:     Tell us if your loved one has ever been confused before. Was it due to illness, medicines or something else?    Remind your loved one often of the date and time. Talk about where they are and why.    We may ask you to help calm your loved one when restless or upset.    Tell us if your loved one is acting odd (while in the hospital or at home).  What should I expect back at home?  Your loved one may feel weak or nervous after returning home. These feelings are common. It may take time to feel normal again. A person may:    Be more forgetful, irritable or depressed.    Have trouble balancing a checkbook, planning a meal or going shopping.    Have trouble focusing at work or juggling the usual tasks.  If your loved one has any of the above problems, talk to your doctor. We can treat them with:     Talk or cognitive therapy.    Physical therapy.    Medicines.  Most of these problems will go away in a few months to a year.  For informational purposes only. Not to replace the advice of your health care provider.   Courtesy of [facility]. Published 2009 by [facility]. All rights reserved. [employer] 018962 - 04/17.            When to Use Antibiotics   Antibiotics are medicines used to treat infections caused by bacteria. They don t work for illnesses caused by viruses or an allergic reaction. In fact, taking antibiotics for reasons other than a bacterial infection can cause problems. For example, you may have side effects from the medicine. And if you really need an antibiotic, it may not work  well.                                                                                                                                              When antibiotics won t help  Your healthcare provider won t usually prescribe antibiotics for the following conditions. You can help by not asking for them if you have:     A cold. This type of illness is caused by a virus. It can cause a runny nose, stuffed-up nose, sneezing, coughing, headache, mild body aches, and low fever. A cold gets better on its own in a few days to a week.    The flu (influenza). This is a respiratory illness caused by a virus. The flu usually goes away on its own in a week or so. It can cause fever, body aches, sore throat, and fatigue.    Bronchitis. This is an infection in the lungs most often caused by a virus. You may have coughing, phlegm, body aches, and a low fever. A common type of bronchitis is known as a chest cold (acute bronchitis). This often happens after you have a respiratory infection like a common cold. Bronchitis can take weeks to go away, but antibiotics usually don t help.    Most sore throats. Sore throats are most often caused by viruses. Your throat may feel scratchy or achy, and it may hurt to swallow. You may also have a low fever and body aches. A sore throat usually gets better in a few days.    Most ear infections. An ear infection may be caused by a virus or bacteria. It causes pain in the ear. Antibiotics usually don t help, and the infection goes away on its own.    Most sinus infections (sinusitis). This kind of infection causes sinus pain and swelling, and a runny nose. In most cases, sinusitis goes away on its own, and antibiotics don t make recovery quicker.    Allergic rhinitis. This is a set of symptoms caused by an allergic reaction. You may have sneezing, a runny nose, itchy or watery eyes, or a sore throat. Allergies are not treated with antibiotics.    Low fever. A mild fever that s less than 100.4 F  (38 C) most likely doesn t need treatment with antibiotics.   When antibiotics can help   Antibiotics can be used to treat:                                                       Strep throat. This is a throat infectioncaused by a certain type of bacteria. Symptoms of strep throat include a sore throat, white patches on the tonsils, red spots on the roof of the mouth, fever, body aches, and nausea and vomiting.    Urinary tract infection (UTI). This is a bacterial infection of the bladder and the tube that takes urine out of the body. It can cause burning pain and urine that s cloudy or tinted with blood. UTIs are very common. Antibiotics usually help treat these infections.    Some ear infections. In some cases, a healthcare provider may prescribe antibiotics for an ear infection. You may need a test to show what s causing the ear infection.    Some sinus infections. In some cases, yourhealthcare provider may give you antibiotics. He or she may first need to make sure your symptoms aren t caused by a virus, fungus, allergies, or air pollutants such as smoke.   Your doctor may also recommend antibiotics if you have a condition that can affect your immune system, such as diabetes or cancer.   Self-care at home   If your infection can t be treated with antibiotics, you can take other steps to feel better. Try the remedies below. In general:     Rest and sleep as much as needed.    Drink water and other clear fluids.    Don t smoke, and avoid smoke from other people.    Use over-the-counter medicine such as acetaminophen to ease pain or fever, as directed by your healthcare provider.   To treat sinus pain or nasal congestion:     Put a warm, moist washcloth on your face where you feel sinus pain or pressure.    Use a nasal spray with medicine or saline, as directed by your healthcare provider.    Breathe in steam from a hot shower.    Use a humidifier or cool mist vaporizer.   To quiet a cough:     Use a humidifier or  cool mist vaporizer.    Breathe in steam from a hot shower.    Use cough lozenges.   To sooth a sore throat:     Suck on ice chips, popsicles, or lozenges.    Use a sore throat spray.    Use a humidifier or cool mist vaporizer.    Gargle with saltwater.    Drink warm liquids.   To ease ear pain:     Hold a warm, moist washcloth on the ear for 10 minutes at a time.  Date Last Reviewed: 9/1/2016 2000-2017 [employer]. [address] [place] [address], [place] [address]. All rights reserved. This information is not intended as a substitute for professional medical care. Always follow your healthcare professional's instructions.

## 2018-06-08 NOTE — ED NOTES
"[facility]  ED Nurse Handoff Report    Tina Powell is a 85 year old female   ED Chief complaint: Altered Mental Status; Headache; and Eye Problem  . ED Diagnosis:   Final diagnoses:   Confusion   Acute cystitis without hematuria     Allergies:   Allergies   Allergen Reactions     Sulfa Drugs Swelling     Hmg-Coa-R Inhibitors Other (See Comments)     Weakness, tingling  Zocor         Code Status: Not on file  Activity level - Baseline/Home:  Independent. Activity Level - Current:   Stand with Assist. Lift room needed: No. Bariatric: No   Needed: No   Isolation: No. Infection: Not Applicable.     Vital Signs:   Vitals:    06/08/18 1230 06/08/18 1300 06/08/18 1322 06/08/18 1323   BP: 142/74 148/87 (!) 145/95    Resp:       Temp:       TempSrc:       SpO2:    93%   Weight:           Cardiac Rhythm:  ,      Pain level:    Patient confused: Yes. Patient Falls Risk: Yes.   Elimination Status: Straight cathed for urine sample   Patient Report - Initial Complaint: Altered mental status, headache. Focused Assessment:  Pt was diagnosed with a UTI yesterday and currently taking cipro.  She was complaining to her daughter earlier today that she was having vision changes, took a nap, and woke up confused, per daughter.  Pt does have history of a stroke which she currently is anticoagulated with Plavix for.  Pt also stated that her \"head was irritating\" her.  Daughter also reported pt having an unsteady gait when this all started as well.  Pt was forgetting where she was and who her daughter was.  Daughter says that pt is usually alert and orientated, although they do believe she is becoming forgetful.  Pt lives alone and still drives.  Upon arrival has been unable to recall date or president.  ABCs intact.    Tests Performed:   Labs Ordered and Resulted from Time of ED Arrival Up to the Time of Departure from the ED   CBC WITH PLATELETS DIFFERENTIAL - Abnormal; Notable for the following:        " Result Value    RBC Count 5.24 (*)     All other components within normal limits   BASIC METABOLIC PANEL - Abnormal; Notable for the following:     GFR Estimate 53 (*)     All other components within normal limits   GLUCOSE BY METER - Abnormal; Notable for the following:     Glucose 68 (*)     All other components within normal limits   GLUCOSE BY METER - Abnormal; Notable for the following:     Glucose 129 (*)     All other components within normal limits   ROUTINE UA WITH MICROSCOPIC   GLUCOSE MONITOR NURSING POCT     CT Head w/o Contrast   Preliminary Result   IMPRESSION:  Diffuse cerebral volume loss and cerebral white matter   changes consistent with chronic small vessel ischemic disease. No   evidence for acute intracranial pathology.          Radiation dose for this scan was reduced using automated exposure   control, adjustment of the mA and/or kV according to patient size, or   iterative reconstruction technique.          Treatments provided: See MAR  Family Comments: Daughter at bedside, very knowledgeable of pts health status  OBS brochure/video discussed/provided to patient:  Yes  ED Medications:   Medications   cefTRIAXone (ROCEPHIN) 1 g vial to attach to  mL bag for ADULTS or NS 50 mL bag for PEDS (1 g Intravenous New Bag 6/8/18 1325)     Drips infusing:  No  For the majority of the shift, the patient's behavior Green. Interventions performed were Rounding.     Severe Sepsis OR Septic Shock Diagnosis Present: No      ED Nurse Name/Phone Number: Marlene Zuñiga,   1:31 PM    RECEIVING UNIT ED HANDOFF REVIEW    Above ED Nurse Handoff Report was reviewed: Yes  Reviewed by: Brandy Chavez on June 8, 2018 at 2:08 PM

## 2018-06-09 ENCOUNTER — APPOINTMENT (OUTPATIENT)
Dept: OCCUPATIONAL THERAPY | Facility: CLINIC | Age: 83
DRG: 689 | End: 2018-06-09
Attending: INTERNAL MEDICINE
Payer: MEDICARE

## 2018-06-09 ENCOUNTER — APPOINTMENT (OUTPATIENT)
Dept: PHYSICAL THERAPY | Facility: CLINIC | Age: 83
DRG: 689 | End: 2018-06-09
Attending: INTERNAL MEDICINE
Payer: MEDICARE

## 2018-06-09 LAB
GLUCOSE BLDC GLUCOMTR-MCNC: 116 MG/DL (ref 70–99)
GLUCOSE BLDC GLUCOMTR-MCNC: 89 MG/DL (ref 70–99)
INTERPRETATION ECG - MUSE: NORMAL

## 2018-06-09 PROCEDURE — 99232 SBSQ HOSP IP/OBS MODERATE 35: CPT | Performed by: INTERNAL MEDICINE

## 2018-06-09 PROCEDURE — 40000193 ZZH STATISTIC PT WARD VISIT: Performed by: PHYSICAL THERAPIST

## 2018-06-09 PROCEDURE — 40000915 ZZH STATISTIC SITTER, EVENING HOURS

## 2018-06-09 PROCEDURE — 25000132 ZZH RX MED GY IP 250 OP 250 PS 637: Mod: GY | Performed by: INTERNAL MEDICINE

## 2018-06-09 PROCEDURE — 25000128 H RX IP 250 OP 636: Performed by: INTERNAL MEDICINE

## 2018-06-09 PROCEDURE — 97535 SELF CARE MNGMENT TRAINING: CPT | Mod: GO | Performed by: STUDENT IN AN ORGANIZED HEALTH CARE EDUCATION/TRAINING PROGRAM

## 2018-06-09 PROCEDURE — 12000007 ZZH R&B INTERMEDIATE

## 2018-06-09 PROCEDURE — A9270 NON-COVERED ITEM OR SERVICE: HCPCS | Mod: GY | Performed by: INTERNAL MEDICINE

## 2018-06-09 PROCEDURE — 97165 OT EVAL LOW COMPLEX 30 MIN: CPT | Mod: GO | Performed by: STUDENT IN AN ORGANIZED HEALTH CARE EDUCATION/TRAINING PROGRAM

## 2018-06-09 PROCEDURE — 97161 PT EVAL LOW COMPLEX 20 MIN: CPT | Mod: GP | Performed by: PHYSICAL THERAPIST

## 2018-06-09 PROCEDURE — 97116 GAIT TRAINING THERAPY: CPT | Mod: GP | Performed by: PHYSICAL THERAPIST

## 2018-06-09 PROCEDURE — 00000146 ZZHCL STATISTIC GLUCOSE BY METER IP

## 2018-06-09 PROCEDURE — 97530 THERAPEUTIC ACTIVITIES: CPT | Mod: GP | Performed by: PHYSICAL THERAPIST

## 2018-06-09 PROCEDURE — 40000914 ZZH STATISTIC SITTER, DAY HOURS

## 2018-06-09 PROCEDURE — 40000133 ZZH STATISTIC OT WARD VISIT: Performed by: STUDENT IN AN ORGANIZED HEALTH CARE EDUCATION/TRAINING PROGRAM

## 2018-06-09 RX ORDER — HYDRALAZINE HYDROCHLORIDE 20 MG/ML
10 INJECTION INTRAMUSCULAR; INTRAVENOUS EVERY 4 HOURS PRN
Status: DISCONTINUED | OUTPATIENT
Start: 2018-06-09 | End: 2018-06-13 | Stop reason: HOSPADM

## 2018-06-09 RX ORDER — FENOFIBRATE 54 MG/1
54 TABLET ORAL DAILY
Status: DISCONTINUED | OUTPATIENT
Start: 2018-06-09 | End: 2018-06-13 | Stop reason: HOSPADM

## 2018-06-09 RX ADMIN — CLOPIDOGREL 75 MG: 75 TABLET, FILM COATED ORAL at 08:28

## 2018-06-09 RX ADMIN — FENOFIBRATE 54 MG: 54 TABLET ORAL at 16:12

## 2018-06-09 RX ADMIN — VENLAFAXINE HYDROCHLORIDE 150 MG: 150 TABLET, EXTENDED RELEASE ORAL at 08:28

## 2018-06-09 RX ADMIN — CEFTRIAXONE SODIUM 1 G: 1 INJECTION, POWDER, FOR SOLUTION INTRAMUSCULAR; INTRAVENOUS at 13:02

## 2018-06-09 RX ADMIN — OMEPRAZOLE 20 MG: 20 CAPSULE, DELAYED RELEASE ORAL at 08:28

## 2018-06-09 RX ADMIN — SODIUM CHLORIDE: 9 INJECTION, SOLUTION INTRAVENOUS at 01:50

## 2018-06-09 RX ADMIN — SODIUM CHLORIDE: 9 INJECTION, SOLUTION INTRAVENOUS at 10:35

## 2018-06-09 ASSESSMENT — ACTIVITIES OF DAILY LIVING (ADL)
ADLS_ACUITY_SCORE: 21
ADLS_ACUITY_SCORE: 19
ADLS_ACUITY_SCORE: 21
ADLS_ACUITY_SCORE: 19

## 2018-06-09 NOTE — PLAN OF CARE
Problem: Patient Care Overview  Goal: Plan of Care/Patient Progress Review  Outcome: No Change  VSS.  Pt continues to be disoriented X4.  She cannot tell writer her name or birth date.  She moves with 1 assist but has difficulty following instructions.  Bedside care attendant present for safety measures/fall risk.  IVF running at 100 ml/hr per orders.  She is incontinent at times of urine.  Brief on.  Positive bowel sounds.  JAYNE last BM.  Was up all night per previous nurse.  Pt occasionally dosing today.  Lighting in room adjusted as well as minimized noise to promote calm environment.  Daughter visited and updated by writer on POC.  SR with BBB on tele.  No new skin issues noted this shift.  Continue to monitor.  Per MD, likely stay the weekend and possible discharge Monday.  Per therapy and MD may need TCU.  Continue with hydration and abx.  PRN hydralazine if needed for BP.

## 2018-06-09 NOTE — PROGRESS NOTES
"[facility]  Hospitalist Progress Note  Arturo Jauregui, DO 06/09/2018    Reason for Stay (Diagnosis): UTI.         Assessment and Plan:      Summary of Stay: Tina Powell is a 85 year old female with a past medical history significant for stroke, hypertension, and possible dementia who was admitted on 6/8/2018 with urinary tract infection.  Problem List:   1. Urinary tract infection.  Continue IV ceftriaxone.  Stop continuous IV fluid.  2. Acute encephalopathy.  With suspected underlying chronic dementia.  Acute issues likely due to UTI.  Also continue to hold alprazolam.  3. Previous stroke.  Continue clopidogrel and restart fenofibrate.  4. Hypertension.  Hold hydrochlorothiazide.  Start continuous IV fluids.  IV hydralazine if needed.  5. GERD.  Omeprazole.  6. Depression.  Continue venlafaxine.  7. Chronic kidney disease.  Recheck metabolic panel tomorrow.  Avoid nephrotoxins as able.  8. Deconditioning.  Physical and Occupational Therapy consults.  DVT Prophylaxis: Pneumatic Compression Devices  Code Status: Full Code  Discharge Dispo: Assisted living versus TCU depending on progress by time of discharge.  Estimated Disch Date / # of Days until Disch: 1-3.        Interval History (Subjective):      Not sure why she is at the hospital today.  Initially, does not even know that she is at the hospital.  Denies chest pain, shortness of breath, fevers, chills, nausea, vomiting, or diarrhea.                  Physical Exam:      Last Vital Signs:  /86 (BP Location: Left arm)  Pulse 72  Temp 97.3  F (36.3  C) (Axillary)  Resp 20  Ht 1.676 m (5' 6\")  Wt 76.2 kg (167 lb 15.9 oz)  SpO2 94%  BMI 27.11 kg/m2    Gen:  NAD, A&Ox1 to self.  Not oriented to place or time.  Eyes:  PERRL, sclera anicteric.  OP:  MMM, no lesions.  Neck:  Supple.  CV:  Regular, no murmurs.  Lung:  CTA b/l, normal effort.  Ab:  +BS, soft.  Skin:  Warm, dry to touch.  No rash.  Ext:  No pitting edema LE " b/l.           Medications:      All current medications were reviewed with changes reflected in problem list.         Data:      All new lab and imaging data was reviewed.   Labs:    Recent Labs  Lab 06/08/18  1213      POTASSIUM 3.8   CHLORIDE 107   CO2 26   ANIONGAP 8   GLC 74   BUN 15   CR 1.00   GFRESTIMATED 53*   GFRESTBLACK 64   MICHELLE 9.4       Recent Labs  Lab 06/08/18  1213   WBC 10.5   HGB 14.2   HCT 43.7   MCV 83         Imaging:   No results found for this or any previous visit (from the past 24 hour(s)).

## 2018-06-09 NOTE — PLAN OF CARE
Problem: Patient Care Overview  Goal: Plan of Care/Patient Progress Review  OT: Evaluation completed, treatment initiated. Pt admitted with altered mental status, had been on medications for UTI prior to admission. Family not present during session, per chart pt resides in a senior facility and relatively independent with ADL/IADLs; at this time pt is not a reliable historian and demonstrates illogical speech when asked open ended questions    Discharge Planner OT   Patient plan for discharge: none stated  Current status: Pt completed sit>stand from chair, FWW, verbal cues, CGA, pt not following cues for safety and demonstrated poor problem solving with table in front of pt and pt still attempting to stand; completed functional mobility in room from chair>bathroom>chair, FWW, CGA; pt required cues for toilet transfer, FWW, CGA including management of clothing, use of toilet paper and how to locate and obtain paper; pt completed one standing task at sink, FWW, Min A to prevent fall with loss of balance, pt required cues to locate items at sink; pt oriented to self only with first and middle name; pt provided with orientation information however unable to retain and recall within one minute time span  Barriers to return to prior living situation: cognition, assist for transfers/mobility, unsafe judgment  Recommendations for discharge: at this time pt appears below baseline, would recommend TCU to attempt pt to return to baseline, will continue to monitor  Rationale for recommendations: demonstrates ability to benefit from OT services to assess safety and ability to return home       Entered by: Sharon Palomino 06/09/2018 2:23 PM

## 2018-06-09 NOTE — PROGRESS NOTES
06/09/18 1200   Quick Adds   Type of Visit Initial PT Evaluation   Living Environment   Lives With facility resident   Living Arrangements assisted living   Home Accessibility no concerns   Transportation Available (according to daughter pt still drives)   Self-Care   Usual Activity Tolerance moderate   Current Activity Tolerance fair   Functional Level Prior   Ambulation 1-->assistive equipment   Transferring 1-->assistive equipment   Toileting 1-->assistive equipment   Fall history within last six months yes   Number of times patient has fallen within last six months 2   General Information   Onset of Illness/Injury or Date of Surgery - Date 06/08/18   Referring Physician Dr Carlson   Patient/Family Goals Statement unable to state   Pertinent History of Current Problem (include personal factors and/or comorbidities that impact the POC) Pt is a 85 year old female with PMH including CVA, hypertension, possible dementia who presents with altered mental status in the setting of a recently diagnosed UTI.   Precautions/Limitations fall precautions   Cognitive Status Examination   Orientation person   Level of Consciousness alert;confused   Follows Commands and Answers Questions able to follow single-step instructions   Personal Safety and Judgment at risk behaviors demonstrated   Memory impaired   Pain Assessment   Patient Currently in Pain No   Range of Motion (ROM)   ROM Comment WFL   Strength   Strength Comments WFL   Bed Mobility   Bed Mobility Comments PT: supine<sit EOB SBA with verbal cues using bed rail   Transfer Skills   Transfer Comments sit<>stand SBA with FWW   Gait   Gait Comments Amb with FWW, 100ft, Koko x1 (+sitter for IV pole)   Balance   Balance Comments Balance impaired with FWW   General Therapy Interventions   Planned Therapy Interventions gait training;transfer training   Clinical Impression   Criteria for Skilled Therapeutic Intervention yes, treatment indicated   PT Diagnosis Impaired  "mobility skills   Influenced by the following impairments balance deficits, cognitive deficits   Functional limitations due to impairments gait   Clinical Presentation Stable/Uncomplicated   Clinical Presentation Rationale PT eval, chart hx   Clinical Decision Making (Complexity) Low complexity   Therapy Frequency` 5 times/week   Predicted Duration of Therapy Intervention (days/wks) 3 days   Anticipated Discharge Disposition Home with Assist   Risk & Benefits of therapy have been explained Yes   Patient, Family & other staff in agreement with plan of care Yes   [facility] AM-[facility] TM \"6 Clicks\"   2016, Trustees of [facility] [facility] [employer], under license to [employer].  All rights reserved.   6 Clicks Short Forms Basic Mobility Inpatient Short Form   [facility] AM-[facility]  \"6 Clicks\" V.2 Basic Mobility Inpatient Short Form   1. Turning from your back to your side while in a flat bed without using bedrails? 4 - None   2. Moving from lying on your back to sitting on the side of a flat bed without using bedrails? 4 - None   3. Moving to and from a bed to a chair (including a wheelchair)? 3 - A Little   4. Standing up from a chair using your arms (e.g., wheelchair, or bedside chair)? 3 - A Little   5. To walk in hospital room? 3 - A Little   6. Climbing 3-5 steps with a railing? 3 - A Little   Basic Mobility Raw Score (Score out of 24.Lower scores equate to lower levels of function) 20   Total Evaluation Time   Total Evaluation Time (Minutes) 15     "

## 2018-06-09 NOTE — PLAN OF CARE
Problem: Patient Care Overview  Goal: Plan of Care/Patient Progress Review  PT eval completed and treatment initiated.  Pt at baseline lives in a senior [facility] and has been quite active and driving until recently daughter reports pt has been more sedentary in her apt.  Pt admitted with AMS in the scope of UTI.    Discharge Planner PT   Patient plan for discharge: pt unable to respond when asked  Current status: Pt confused to place/time, transferred out of bed to standing with verbal cues for safety, amb with FWW, close SBA, shuffling gait with very short steps, difficulty initiating steps, difficulty navigating FWW, balance deficits  Barriers to return to prior living situation: confusion, unsteady balance, fall risk for gait  Recommendations for discharge: return to [facility] with increased services as needed if confusion clears otherwise TCU  Rationale for recommendations: Pt will benefit from skilled PT to work on mobility skills including gait and balance to decrease fall risk in her env and return to PLOF       Entered by: Fabiana Pedersen 06/09/2018 1:23 PM

## 2018-06-09 NOTE — PROGRESS NOTES
06/09/18 1404   Quick Adds   Type of Visit Initial Occupational Therapy Evaluation   Living Environment   Lives With facility resident   Living Arrangements assisted living   Home Accessibility no concerns   Living Environment Comment pt reports living in a house with people who are close to her   Self-Care   Usual Activity Tolerance moderate   Current Activity Tolerance fair   Functional Level Prior   Ambulation 1-->assistive equipment   Transferring 1-->assistive equipment   Toileting 1-->assistive equipment   Bathing 0-->independent   Dressing 0-->independent   Prior Functional Level Comment pt unable to provide background information, declines having a walker at home   General Information   Onset of Illness/Injury or Date of Surgery - Date 06/09/18   Referring Physician Juventino REINA   Patient/Family Goals Statement get her head better   Additional Occupational Profile Info/Pertinent History of Current Problem Pt admitted from a facility when dtr noticed pt more confused, forgetful, unable to determine who she was and had unsteady gait; pt recently began treatment for UTI prior to admission; PMHx CVA, depression   Precautions/Limitations fall precautions   Cognitive Status Examination   Orientation person   Level of Consciousness alert  (sleepy)   Cognitive Comment pt oriented to self only by first and middle name; pt alert however yawning frequently throughout session   Pain Assessment   Patient Currently in Pain No   Range of Motion (ROM)   ROM Comment B UE AROM intact   Strength   Strength Comments B UE grossly 4/5   Mobility   Bed Mobility Comments not assessed   Transfer Skills   Transfer Comments FWW, CGA, verbal cues   Transfer Skill: Sit to Stand   Level of [place]: Sit/Stand contact guard   Physical Assist/Nonphysical Assist: Sit/Stand verbal cues   Assistive Device for Transfer: Sit/Stand rolling walker   Transfer Skill: Toilet Transfer   Level of [place]: Toilet contact guard   Physical  "Assist/Nonphysical Assist: Toilet verbal cues   Assistive Device rolling walker   Balance   Balance Comments  impaired despite use of FWW, pt unsteady when walking and standing   Toileting   Level of [place]: Toilet contact guard  (toilet, FWW)   Physical Assist/Nonphysical Assist: Toilet verbal cues   Grooming   Level of [place]: Grooming minimum assist (75% patients effort)  (1 standing task at sink, FWW)   Physical Assist/Nonphysical Assist: Grooming verbal cues   Instrumental Activities of Daily Living (IADL)   IADL Comments per chart pt is relatively independent with ADL/IADLs in her apartment including driving, pt not able to provide information this date due to impaired cognition with illogical speaking at times   Activities of Daily Living Analysis   Impairments Contributing to Impaired Activities of Daily Living balance impaired;cognition impaired;strength decreased   General Therapy Interventions   Planned Therapy Interventions ADL retraining;IADL retraining;transfer training   Clinical Impression   Criteria for Skilled Therapeutic Interventions Met yes, treatment indicated   OT Diagnosis impaired ability to manage basic self cares   Influenced by the following impairments impaired cognition, balance and strength   Assessment of Occupational Performance 3-5 Performance Deficits   Identified Performance Deficits impaired ability to manage dressing, bathing, toilet, med management   Clinical Decision Making (Complexity) Low complexity   Therapy Frequency daily   Predicted Duration of Therapy Intervention (days/wks) 4 days   Anticipated Discharge Disposition Transitional Care Facility   Risks and Benefits of Treatment have been explained. Yes   Patient, Family & other staff in agreement with plan of care Yes   Clinical Impression Comments pt demonstrates ability to benefit from skilled OT services   [facility] AM-[facility]AC TM \"6 Clicks\"   2016, Trustees of [facility], under license to " "[employer].  All rights reserved.   6 Clicks Short Forms Daily Activity Inpatient Short Form   [facility] AM-[facility]  \"6 Clicks\" Daily Activity Inpatient Short Form   1. Putting on and taking off regular lower body clothing? 3 - A Little   2. Bathing (including washing, rinsing, drying)? 2 - A Lot   3. Toileting, which includes using toilet, bedpan or urinal? 3 - A Little   4. Putting on and taking off regular upper body clothing? 3 - A Little   5. Taking care of personal grooming such as brushing teeth? 3 - A Little   6. Eating meals? 3 - A Little   Daily Activity Raw Score (Score out of 24.Lower scores equate to lower levels of function) 17   Total Evaluation Time   Total Evaluation Time (Minutes) 8     "

## 2018-06-09 NOTE — PLAN OF CARE
Problem: Patient Care Overview  Goal: Plan of Care/Patient Progress Review  Outcome: Improving  A/O to self, Disorientated to time, place, situation. Does not use call light, frequently self transferring from bed to recliner, attempted non-pharmacological and pharmacological interventions with little result (melatonin, snacks, music therapy, toileting, walking, lavender essential oils, etc).  Asst x1 with walker and gait belt, NS at 100ml/hr, Tele SR,  Regular diet, IV rocephin, PT consult, lives at a senior assisted living. Continue with plan of care.

## 2018-06-09 NOTE — PLAN OF CARE
Problem: Patient Care Overview  Goal: Plan of Care/Patient Progress Review  RN- VSS, afeb. Denies pain. Disoriented X4. Up in room with SBA. Does not use call light. Bed alarm in place. Plan for IV ABX and PT consult. Continue current POC.

## 2018-06-10 ENCOUNTER — APPOINTMENT (OUTPATIENT)
Dept: PHYSICAL THERAPY | Facility: CLINIC | Age: 83
DRG: 689 | End: 2018-06-10
Payer: MEDICARE

## 2018-06-10 LAB
ALBUMIN SERPL-MCNC: 3 G/DL (ref 3.4–5)
ALP SERPL-CCNC: 42 U/L (ref 40–150)
ALT SERPL W P-5'-P-CCNC: 27 U/L (ref 0–50)
ANION GAP SERPL CALCULATED.3IONS-SCNC: 6 MMOL/L (ref 3–14)
ANTIMICROBIAL SUSCEPTIBILITY: ABNORMAL
AST SERPL W P-5'-P-CCNC: 19 U/L (ref 0–45)
BILIRUB SERPL-MCNC: 0.9 MG/DL (ref 0.2–1.3)
BUN SERPL-MCNC: 12 MG/DL (ref 7–30)
CALCIUM SERPL-MCNC: 8.6 MG/DL (ref 8.5–10.1)
CHLORIDE SERPL-SCNC: 107 MMOL/L (ref 94–109)
CO2 SERPL-SCNC: 28 MMOL/L (ref 20–32)
CREAT SERPL-MCNC: 0.89 MG/DL (ref 0.52–1.04)
ERYTHROCYTE [DISTWIDTH] IN BLOOD BY AUTOMATED COUNT: 14.4 % (ref 10–15)
GFR SERPL CREATININE-BSD FRML MDRD: 60 ML/MIN/1.7M2
GLUCOSE BLDC GLUCOMTR-MCNC: 88 MG/DL (ref 70–99)
GLUCOSE BLDC GLUCOMTR-MCNC: 98 MG/DL (ref 70–99)
GLUCOSE SERPL-MCNC: 95 MG/DL (ref 70–99)
HCT VFR BLD AUTO: 39.1 % (ref 35–47)
HGB BLD-MCNC: 12.5 G/DL (ref 11.7–15.7)
Lab: ABNORMAL
Lab: ABNORMAL
MCH RBC QN AUTO: 27.1 PG (ref 26.5–33)
MCHC RBC AUTO-ENTMCNC: 32 G/DL (ref 31.5–36.5)
MCV RBC AUTO: 85 FL (ref 78–100)
PLATELET # BLD AUTO: 285 10E9/L (ref 150–450)
POTASSIUM SERPL-SCNC: 3.6 MMOL/L (ref 3.4–5.3)
PROT SERPL-MCNC: 6.4 G/DL (ref 6.8–8.8)
RBC # BLD AUTO: 4.62 10E12/L (ref 3.8–5.2)
SODIUM SERPL-SCNC: 141 MMOL/L (ref 133–144)
URINE CULTURE: ABNORMAL
WBC # BLD AUTO: 7.6 10E9/L (ref 4–11)

## 2018-06-10 PROCEDURE — 80053 COMPREHEN METABOLIC PANEL: CPT | Performed by: INTERNAL MEDICINE

## 2018-06-10 PROCEDURE — A9270 NON-COVERED ITEM OR SERVICE: HCPCS | Mod: GY | Performed by: INTERNAL MEDICINE

## 2018-06-10 PROCEDURE — 97116 GAIT TRAINING THERAPY: CPT | Mod: GP | Performed by: PHYSICAL THERAPIST

## 2018-06-10 PROCEDURE — 00000146 ZZHCL STATISTIC GLUCOSE BY METER IP

## 2018-06-10 PROCEDURE — 25000132 ZZH RX MED GY IP 250 OP 250 PS 637: Mod: GY | Performed by: INTERNAL MEDICINE

## 2018-06-10 PROCEDURE — 40000914 ZZH STATISTIC SITTER, DAY HOURS

## 2018-06-10 PROCEDURE — 40000193 ZZH STATISTIC PT WARD VISIT: Performed by: PHYSICAL THERAPIST

## 2018-06-10 PROCEDURE — 25000128 H RX IP 250 OP 636: Performed by: INTERNAL MEDICINE

## 2018-06-10 PROCEDURE — 40000916 ZZH STATISTIC SITTER, NIGHT HOURS

## 2018-06-10 PROCEDURE — 12000007 ZZH R&B INTERMEDIATE

## 2018-06-10 PROCEDURE — 36415 COLL VENOUS BLD VENIPUNCTURE: CPT | Performed by: INTERNAL MEDICINE

## 2018-06-10 PROCEDURE — 99232 SBSQ HOSP IP/OBS MODERATE 35: CPT | Performed by: INTERNAL MEDICINE

## 2018-06-10 PROCEDURE — 85027 COMPLETE CBC AUTOMATED: CPT | Performed by: INTERNAL MEDICINE

## 2018-06-10 PROCEDURE — 97530 THERAPEUTIC ACTIVITIES: CPT | Mod: GP | Performed by: PHYSICAL THERAPIST

## 2018-06-10 RX ADMIN — CLOPIDOGREL 75 MG: 75 TABLET, FILM COATED ORAL at 07:58

## 2018-06-10 RX ADMIN — CEFTRIAXONE SODIUM 1 G: 1 INJECTION, POWDER, FOR SOLUTION INTRAMUSCULAR; INTRAVENOUS at 12:57

## 2018-06-10 RX ADMIN — FENOFIBRATE 54 MG: 54 TABLET ORAL at 07:58

## 2018-06-10 RX ADMIN — OMEPRAZOLE 20 MG: 20 CAPSULE, DELAYED RELEASE ORAL at 07:59

## 2018-06-10 RX ADMIN — VENLAFAXINE HYDROCHLORIDE 150 MG: 150 TABLET, EXTENDED RELEASE ORAL at 07:58

## 2018-06-10 ASSESSMENT — ACTIVITIES OF DAILY LIVING (ADL)
ADLS_ACUITY_SCORE: 19

## 2018-06-10 NOTE — PLAN OF CARE
Problem: Patient Care Overview  Goal: Plan of Care/Patient Progress Review  Outcome: No Change  Pt Up in chair most of this shift. Assist of one in room, PSC at bed side. Hearing aid in place both ear. Awake disoriented x4. VSS afebrile, LS clear. Tele SR. PIV saline locked. No c/o chest pain or difficulty breathing. On IV Abx Zosyn. Will monitor and continue POC.

## 2018-06-10 NOTE — PLAN OF CARE
Problem: Patient Care Overview  Goal: Plan of Care/Patient Progress Review  Outcome: No Change  VSS.  Continues to be disoriented X4.  Up with 1/FWW/gait belt.  Incontinent of urine.  Needs encouragement to eat/drink.  IV SL, abx.  No new skin issues noted this shift.  Denies pain.  Bedside  discontinued.  Bed alarm and fall precautions continue.  Pt has not tried climbing out of bed or pulling at lines at all this shift.  SR with BBB and inverted t waves on tele.  Family had asked about urine culture.  No results seen yet from clinic sample.  Not aware of one being drawn this admission.  Note left for provider about discharge plan and contacting daughter Brittanie with updates when available.  Continue to monitor.  Plan is possible discharge to TCU early this week per notes.

## 2018-06-10 NOTE — PLAN OF CARE
Problem: Patient Care Overview  Goal: Plan of Care/Patient Progress Review  Discharge Planner PT   Patient plan for discharge: pt unable to respond when asked  Current status: Pt continues to have poor cognition, not oriented to place, time, situation. Pt educated on PT role. Pt cued for supine to sit needing min/mod A. Pt was cued for safe FWW skills.  Pt needing min A from toilet. Cued for each step- wiping, standing, remembering that she already went to the bathroom. Pt was cued for 50 feet of ambulation. Pt needing reinforcement constantly for FWW safety throughout sessopm. PT even attempting some ambulation without FWW as pt having a hard time with body position in FWW. Very poor tolerance with this. PT attempting to re-oreint her to situation throughout session. Continued confusion. Very mild if any retention.   Barriers to return to prior living situation: confusion, unsteady balance, fall risk for gait  Recommendations for discharge:TCU  Rationale for recommendations: Pt will benefit from skilled PT to work on mobility skills including gait and balance to decrease fall risk in her env and return to PLOF       Entered by: Gabbi Erazo 06/10/2018 3:54 PM

## 2018-06-10 NOTE — PLAN OF CARE
Problem: Patient Care Overview  Goal: Plan of Care/Patient Progress Review  Outcome: Improving  Disorientated x4, asst x1 with walker, sitter present at bedside,  Redirectable at times, easily agitated and irritable, distractions utilized (snacks, music, beverages, walking etc), Tele SR,  Regular diet, IV rocephin, slept well this shift, PT and OT following, lives at a senior assisted living. Continue with plan of care.

## 2018-06-10 NOTE — PROGRESS NOTES
"[facility]  Hospitalist Progress Note  Arturo Jauregui, DO 06/10/2018    Reason for Stay (Diagnosis): UTI         Assessment and Plan:      Summary of Stay: Tina Powell is a 85 year old female with a past medical history significant for stroke, hypertension, and possible dementia who was admitted on 6/8/2018 with urinary tract infection.  Problem List:   1. Urinary tract infection.  Continue IV ceftriaxone.    2. Acute encephalopathy.  With suspected underlying chronic dementia.  Acute issues likely due to UTI.  Also continue to hold alprazolam.  3. Previous stroke.  Continue clopidogrel and restart fenofibrate.  4. Hypertension.  Hold hydrochlorothiazide.  IV hydralazine if needed.  5. GERD.  Omeprazole.  6. Depression.  Continue venlafaxine.  7. Chronic kidney disease.  Recheck metabolic panel tomorrow.  Avoid nephrotoxins as able.  8. Deconditioning.  Physical and Occupational Therapy consults.  DVT Prophylaxis: Pneumatic Compression Devices  Code Status: Full Code  Discharge Dispo: Assisted living versus TCU depending on progress by time of discharge.  Estimated Disch Date / # of Days until Disch: 1-2.        Interval History (Subjective):      Denies CP, SOB, F/C, N/V, or diarrhea.                  Physical Exam:      Last Vital Signs:  /79 (BP Location: Right arm)  Pulse 72  Temp 97.7  F (36.5  C) (Oral)  Resp 18  Ht 1.676 m (5' 6\")  Wt 77.7 kg (171 lb 3.2 oz)  SpO2 92%  BMI 27.63 kg/m2    Gen:  NAD, A&Ox1 to self only.  Not oriented to place or time.  Eyes:  PERRL, sclera anicteric.  OP:  MMM, no lesions.  Neck:  Supple.  CV:  Regular, no murmurs.  Lung:  CTA b/l, normal effort.  Ab:  +BS, soft.  Skin:  Warm, dry to touch.  No rash.  Ext:  No pitting edema LE b/l.           Medications:      All current medications were reviewed with changes reflected in problem list.         Data:      All new lab and imaging data was reviewed.   Labs:    Recent Labs  Lab " 06/10/18  0606      POTASSIUM 3.6   CHLORIDE 107   CO2 28   ANIONGAP 6   GLC 95   BUN 12   CR 0.89   GFRESTIMATED 60*   GFRESTBLACK 72   MICHELLE 8.6       Recent Labs  Lab 06/10/18  0606   WBC 7.6   HGB 12.5   HCT 39.1   MCV 85         Imaging:   No results found for this or any previous visit (from the past 24 hour(s)).

## 2018-06-10 NOTE — PLAN OF CARE
Problem: Patient Care Overview  Goal: Plan of Care/Patient Progress Review  OT: Attempted session x2 this am, pt sleeping and sitter requested pt rest as had uneasy night; when re-attempting this pm noted pt with scheduled PT session, will reschedule OT and continue to follow for discharge needs

## 2018-06-11 ENCOUNTER — APPOINTMENT (OUTPATIENT)
Dept: PHYSICAL THERAPY | Facility: CLINIC | Age: 83
DRG: 689 | End: 2018-06-11
Payer: MEDICARE

## 2018-06-11 ENCOUNTER — APPOINTMENT (OUTPATIENT)
Dept: OCCUPATIONAL THERAPY | Facility: CLINIC | Age: 83
DRG: 689 | End: 2018-06-11
Attending: INTERNAL MEDICINE
Payer: MEDICARE

## 2018-06-11 DIAGNOSIS — R10.13 DYSPEPSIA: ICD-10-CM

## 2018-06-11 DIAGNOSIS — R05.3 CHRONIC COUGH: ICD-10-CM

## 2018-06-11 LAB
GLUCOSE BLDC GLUCOMTR-MCNC: 108 MG/DL (ref 70–99)
GLUCOSE BLDC GLUCOMTR-MCNC: 122 MG/DL (ref 70–99)
GLUCOSE BLDC GLUCOMTR-MCNC: 95 MG/DL (ref 70–99)
MAGNESIUM SERPL-MCNC: 2.4 MG/DL (ref 1.6–2.3)
POTASSIUM SERPL-SCNC: 4 MMOL/L (ref 3.4–5.3)

## 2018-06-11 PROCEDURE — A9270 NON-COVERED ITEM OR SERVICE: HCPCS | Mod: GY | Performed by: INTERNAL MEDICINE

## 2018-06-11 PROCEDURE — 25000132 ZZH RX MED GY IP 250 OP 250 PS 637: Mod: GY | Performed by: INTERNAL MEDICINE

## 2018-06-11 PROCEDURE — 83735 ASSAY OF MAGNESIUM: CPT | Performed by: INTERNAL MEDICINE

## 2018-06-11 PROCEDURE — 97116 GAIT TRAINING THERAPY: CPT | Mod: GP | Performed by: PHYSICAL THERAPY ASSISTANT

## 2018-06-11 PROCEDURE — 97110 THERAPEUTIC EXERCISES: CPT | Mod: GP | Performed by: PHYSICAL THERAPY ASSISTANT

## 2018-06-11 PROCEDURE — 36415 COLL VENOUS BLD VENIPUNCTURE: CPT | Performed by: INTERNAL MEDICINE

## 2018-06-11 PROCEDURE — 97535 SELF CARE MNGMENT TRAINING: CPT | Mod: GO

## 2018-06-11 PROCEDURE — 40000193 ZZH STATISTIC PT WARD VISIT: Performed by: PHYSICAL THERAPY ASSISTANT

## 2018-06-11 PROCEDURE — 25000128 H RX IP 250 OP 636: Performed by: INTERNAL MEDICINE

## 2018-06-11 PROCEDURE — 97530 THERAPEUTIC ACTIVITIES: CPT | Mod: GP | Performed by: PHYSICAL THERAPY ASSISTANT

## 2018-06-11 PROCEDURE — 99232 SBSQ HOSP IP/OBS MODERATE 35: CPT | Performed by: INTERNAL MEDICINE

## 2018-06-11 PROCEDURE — 12000007 ZZH R&B INTERMEDIATE

## 2018-06-11 PROCEDURE — 00000146 ZZHCL STATISTIC GLUCOSE BY METER IP

## 2018-06-11 PROCEDURE — 84132 ASSAY OF SERUM POTASSIUM: CPT | Performed by: INTERNAL MEDICINE

## 2018-06-11 PROCEDURE — 40000133 ZZH STATISTIC OT WARD VISIT

## 2018-06-11 RX ADMIN — FENOFIBRATE 54 MG: 54 TABLET ORAL at 08:25

## 2018-06-11 RX ADMIN — OMEPRAZOLE 20 MG: 20 CAPSULE, DELAYED RELEASE ORAL at 08:25

## 2018-06-11 RX ADMIN — CEFTRIAXONE SODIUM 1 G: 1 INJECTION, POWDER, FOR SOLUTION INTRAMUSCULAR; INTRAVENOUS at 12:50

## 2018-06-11 RX ADMIN — CLOPIDOGREL 75 MG: 75 TABLET, FILM COATED ORAL at 08:25

## 2018-06-11 RX ADMIN — VENLAFAXINE HYDROCHLORIDE 150 MG: 150 TABLET, EXTENDED RELEASE ORAL at 08:25

## 2018-06-11 ASSESSMENT — ACTIVITIES OF DAILY LIVING (ADL)
ADLS_ACUITY_SCORE: 19

## 2018-06-11 NOTE — PROGRESS NOTES
Clinic Care Coordination Contact    Situation: Patient chart reviewed by care coordinator.    Background: Referral received referral for resource navigation.     Assessment: Pt is now receiving care at [facility] with plans to transition to a TCU.     Plan/Recommendations: PRAFUL TORRES will continue to follow.    BRENDAN Gallagher  Clinic Care Coordinator  [phone]  [email or web address]

## 2018-06-11 NOTE — PLAN OF CARE
Problem: Patient Care Overview  Goal: Plan of Care/Patient Progress Review    Discharge Planner OT   Patient plan for discharge: none stated  Current status: Pt seated in chair upon OT arrival, agreeable to session, daughter present. Pt A&O to self only, provided orientation cuing. Pt in agreement with plan to ambulate to bathroom, sit>stand CGA with heavy cuing for safe hand placement, pt with fair balance in stance, stating weakness, returned to sit Min A. Trialed again Min A, reporting dizziness, returned to sit in chair again Min A. Pt stating need for BM, BSC provided, Min Ax2 for safety from chair to commode; heavy cuing needed for safety and A provided to steer 2ww; stand>sit BSC Min Ax2. Direct handoff to RN at end of session.  Barriers to return to prior living situation: cognition, assist for transfers/mobility, unsafe judgment  Recommendations for discharge: TCU as pt is unsafe to return home at this time  Rationale for recommendations: Pt to benefit from continued IP OT services to maximize safety and independence in ADL and functional mobility tasks prior to safe d/c home       Entered by: Mary Anne Bains 06/11/2018 12:11 PM

## 2018-06-11 NOTE — PROGRESS NOTES
Clinic Care Coordination Contact  [facility]/Voicemail       Clinical Data: Care Coordinator Outreach  Outreach attempted x 1.  Left message on voicemail with call back information and requested return call.  Plan:  Care Coordinator will try to reach patient again in 3-5 business days.    Jennifer Baumann [facility]  Clinic Care Coordinator  [phone]  [email or web address]

## 2018-06-11 NOTE — CONSULTS
Care Transition Initial Assessment -   Reason For Consult: discharge planning  Met with: Patient and Family    Active Problems:    Encephalopathy       DATA  Lives With: alone  Living Arrangements: apartment- Pt lives in a senior apt that does NOT offer any services   Description of Support System: Supportive, Involved  Who is your support system?: Children  Support Assessment: Adequate family and caregiver support. Pt has not outside support services, has still been driving.  Identified issues/concerns regarding health management: Pt being treated for UTI has more confusion then normal      Resources List: Skilled Nursing Facility  Pt has been to TCU in the past.         Transportation Available: family or friend will provide  Family would prefer to transport   ASSESSMENT  Cognitive Status:  awake  Concerns to be addressed: PT lives alone, has been doing her own cooking,cleaning and driving prior to admission. [facility]. This would be 1st choice for placement. PT would be open to either shared or private room    PT at this time is using a walker but at baseline does not use and DME other than her life line    PLAN  Following for TCU for Wed

## 2018-06-11 NOTE — PLAN OF CARE
Problem: Patient Care Overview  Goal: Plan of Care/Patient Progress Review  Outcome: No Change  VSS, Alert, pt partially oriented to self, unsure of .  Assist x1 with walker and gait belt, at times assist x2.  PT following.  Tele- SR with BBB.  Pt on BG checks.  IV rocephon q24, new IV started today, previous IV appears to have been removed by pt.  Discharge 1-2 days, long term vs TCU.  Daughter at bedside part of morning.

## 2018-06-11 NOTE — PLAN OF CARE
Problem: Patient Care Overview  Goal: Plan of Care/Patient Progress Review  Outcome: No Change  Pt disoriented x4. Mood changes often, uncooperative at times refuse cares. Daughter at bed side, reorientation provided. Pt awake up in chair, ambulated in room SBA with walker and gait belt. Safety alarm in place. VSS afebrile. LS clear O2 Sat 93% RA. Denies chest pain or sob. Tele SR with BBB and inverted T's. PIV saline locked. On IV Abx Rocephine. Per report Pt has Psoriasis, bilateral LE skin dry and flaky. PT and OT following. Will monitor and continue POC.

## 2018-06-11 NOTE — PLAN OF CARE
Problem: Patient Care Overview  Goal: Plan of Care/Patient Progress Review  Discharge Planner PT   Patient plan for discharge: pt unable to respond when asked  Current status: Pt transfers supine to sit with min assist and min assist with sit to/from stand with vcs for hand placement for safety. Pt transfers bed to chair to/from bathroom with ww with min assist along with vcs for direction. Assisted pt with doffing and donning attends and pericares. Pt amb 15' & 20' with ww with min assist with vcs for direction.  Barriers to return to prior living situation: confusion, unsteady balance, fall risk for gait  Recommendations for discharge:TCU  Rationale for recommendations: Pt will benefit from skilled PT to work on mobility skills including gait and balance to decrease fall risk in her env and return to PLOF       Entered by: Shazia Tucker 06/11/2018 4:05 PM

## 2018-06-11 NOTE — PLAN OF CARE
"Problem: Patient Care Overview  Goal: Plan of Care/Patient Progress Review  Outcome: Improving  Vitals: /88  Pulse 72  Temp 98.2  F (36.8  C) (Oral)  Resp 18  Ht 1.676 m (5' 6\")  Wt 77.7 kg (171 lb 3.2 oz)  SpO2 93%  BMI 27.63 kg/m2  Tele: SR with BBB and 1st degree heart block  Pain: Pt denies pain at this time  LOC: Oriented to self.   Mobility:Pt is up with SBA, walker and gait belt.  Lungs:Lung sounds clear  : Incontinent at times. Voiding adequate amounts this shift  GI: Bowel sounds active.  IV:Saline locked  Other: PT/OT following. Assisted living vs TCU?          "

## 2018-06-11 NOTE — PLAN OF CARE
Problem: Patient Care Overview  Goal: Plan of Care/Patient Progress Review  PT - Pt declined PT d/t patient just recv'ing lunch.  Second attempt was made and pt back in bed.

## 2018-06-11 NOTE — PROGRESS NOTES
"[facility]  Hospitalist Progress Note  Arturo Jauregui, DO 06/11/2018    Reason for Stay (Diagnosis): UTI         Assessment and Plan:      Summary of Stay: Tina Powell is a 85 year old female with a past medical history significant for stroke, hypertension, and possible dementia who was admitted on 6/8/2018 with urinary tract infection.  Problem List:   1. Urinary tract infection.  Continue IV ceftriaxone.  Urine culture from outside facility with Klebsiella and group B strep.  2. Acute encephalopathy.  With suspected underlying chronic dementia.  Acute issues likely due to UTI.  Slowly improving.  Also continue to hold alprazolam.  3. Previous stroke.  Continue clopidogrel and fenofibrate.  4. Hypertension.  Continue to hold hydrochlorothiazide.  IV hydralazine if needed.  5. GERD.  Omeprazole.  6. Depression.  Continue venlafaxine.  7. Chronic kidney disease.  Creatinine has been stable.  Avoid nephrotoxins as able.  8. Deconditioning.  Physical and Occupational Therapy consults.  DVT Prophylaxis: Pneumatic Compression Devices  Code Status: Full Code  Discharge Dispo: TCU.  Estimated Disch Date / # of Days until Disch: 2.        Interval History (Subjective):      Denies chest pain, shortness of breath, fevers, chills, nausea, vomiting, or diarrhea.                  Physical Exam:      Last Vital Signs:  /88 (BP Location: Left arm)  Pulse 72  Temp 98  F (36.7  C) (Oral)  Resp 18  Ht 1.676 m (5' 6\")  Wt 77.7 kg (171 lb 3.2 oz)  SpO2 93%  BMI 27.63 kg/m2    Gen:  NAD, A&Ox 1 to self and family.  Not oriented to place or time.  Eyes:  PERRL, sclera anicteric.  OP:  MMM, no lesions.  Neck:  Supple.  CV:  Regular, no murmurs.  Lung:  CTA b/l, normal effort.  Ab:  +BS, soft.  Skin:  Warm, dry to touch.  No rash.  Ext:  No pitting edema LE b/l.           Medications:      All current medications were reviewed with changes reflected in problem list.         Data:      All new lab " and imaging data was reviewed.   Labs:    Recent Labs  Lab 06/11/18  1012 06/10/18  0606   NA  --  141   POTASSIUM 4.0 3.6   CHLORIDE  --  107   CO2  --  28   ANIONGAP  --  6   GLC  --  95   BUN  --  12   CR  --  0.89   GFRESTIMATED  --  60*   GFRESTBLACK  --  72   MICHELLE  --  8.6       Recent Labs  Lab 06/10/18  0606   WBC 7.6   HGB 12.5   HCT 39.1   MCV 85         Imaging:   No results found for this or any previous visit (from the past 24 hour(s)).

## 2018-06-12 ENCOUNTER — APPOINTMENT (OUTPATIENT)
Dept: OCCUPATIONAL THERAPY | Facility: CLINIC | Age: 83
DRG: 689 | End: 2018-06-12
Payer: MEDICARE

## 2018-06-12 ENCOUNTER — APPOINTMENT (OUTPATIENT)
Dept: PHYSICAL THERAPY | Facility: CLINIC | Age: 83
DRG: 689 | End: 2018-06-12
Payer: MEDICARE

## 2018-06-12 LAB
GLUCOSE BLDC GLUCOMTR-MCNC: 104 MG/DL (ref 70–99)
GLUCOSE BLDC GLUCOMTR-MCNC: 116 MG/DL (ref 70–99)
GLUCOSE BLDC GLUCOMTR-MCNC: 84 MG/DL (ref 70–99)
GLUCOSE BLDC GLUCOMTR-MCNC: 92 MG/DL (ref 70–99)
GLUCOSE BLDC GLUCOMTR-MCNC: 95 MG/DL (ref 70–99)

## 2018-06-12 PROCEDURE — 97530 THERAPEUTIC ACTIVITIES: CPT | Mod: GP | Performed by: PHYSICAL THERAPIST

## 2018-06-12 PROCEDURE — 25000132 ZZH RX MED GY IP 250 OP 250 PS 637: Mod: GY | Performed by: INTERNAL MEDICINE

## 2018-06-12 PROCEDURE — A9270 NON-COVERED ITEM OR SERVICE: HCPCS | Mod: GY | Performed by: INTERNAL MEDICINE

## 2018-06-12 PROCEDURE — 12000007 ZZH R&B INTERMEDIATE

## 2018-06-12 PROCEDURE — 40000133 ZZH STATISTIC OT WARD VISIT

## 2018-06-12 PROCEDURE — 97116 GAIT TRAINING THERAPY: CPT | Mod: GP | Performed by: PHYSICAL THERAPIST

## 2018-06-12 PROCEDURE — 25000128 H RX IP 250 OP 636: Performed by: INTERNAL MEDICINE

## 2018-06-12 PROCEDURE — 97535 SELF CARE MNGMENT TRAINING: CPT | Mod: GO

## 2018-06-12 PROCEDURE — 99232 SBSQ HOSP IP/OBS MODERATE 35: CPT | Performed by: INTERNAL MEDICINE

## 2018-06-12 PROCEDURE — 97530 THERAPEUTIC ACTIVITIES: CPT | Mod: GO

## 2018-06-12 PROCEDURE — 40000193 ZZH STATISTIC PT WARD VISIT: Performed by: PHYSICAL THERAPIST

## 2018-06-12 PROCEDURE — 00000146 ZZHCL STATISTIC GLUCOSE BY METER IP

## 2018-06-12 RX ADMIN — VENLAFAXINE HYDROCHLORIDE 150 MG: 150 TABLET, EXTENDED RELEASE ORAL at 08:51

## 2018-06-12 RX ADMIN — CLOPIDOGREL 75 MG: 75 TABLET, FILM COATED ORAL at 08:51

## 2018-06-12 RX ADMIN — OMEPRAZOLE 20 MG: 20 CAPSULE, DELAYED RELEASE ORAL at 08:51

## 2018-06-12 RX ADMIN — FENOFIBRATE 54 MG: 54 TABLET ORAL at 08:51

## 2018-06-12 RX ADMIN — CEFTRIAXONE SODIUM 1 G: 1 INJECTION, POWDER, FOR SOLUTION INTRAMUSCULAR; INTRAVENOUS at 11:44

## 2018-06-12 ASSESSMENT — ACTIVITIES OF DAILY LIVING (ADL)
ADLS_ACUITY_SCORE: 19

## 2018-06-12 NOTE — PLAN OF CARE
Problem: Patient Care Overview  Goal: Plan of Care/Patient Progress Review  Discharge Planner PT   Patient plan for discharge: pt unable to respond when asked  Current status: Pt continues to need redirection for BR tasks, still very confused, unsteady but A x 1.   Barriers to return to prior living situation: confusion, unsteady balance, fall risk for gait  Recommendations for discharge:TCU  Rationale for recommendations: Pt will benefit from skilled PT to work on mobility skills including gait and balance to decrease fall risk in her env and return to PLOF       Entered by: Gabbi Erazo 06/12/2018 4:29 PM

## 2018-06-12 NOTE — PROGRESS NOTES
CM: Spoke with [facility] about TCU placement. They would need pt's Humara injection to be placed on hold while in TCU.. Cost of injection would be $5,000. TCU only able to take pt if MD can write on DC orders that Injection will be placed on hold until after DC from TCU.  Spoke with Daughter and MD,. Both are comfortable with placing this medication injection on hold until out of TCU  I/P Anticipate dc tomorrow to TCU. Family prefer to transport

## 2018-06-12 NOTE — PLAN OF CARE
Problem: Patient Care Overview  Goal: Plan of Care/Patient Progress Review    Discharge Planner OT   Patient plan for discharge: none stated  Current status: Pt sleeping soundly upon OT arrival, agreeable to session after waking with moderate verbal and tactile stimuli. Pt slow moving this AM; Min Ax2 from side-lying to sit EOB. Heavy cuing needed to put hands on 2ww for safety, MinAx2 to initiate sit>stand transfer; pt able to complete Min Ax1. Ambulated to bathroom Min Ax1 with 2ww, cuing needed for safe 2ww use and navigation. Sit<>stand comfort height toilet Min A. Mod A to don briefs following toileting. With cuing, pt stood sinkside to complete hand hygiene; noted pt able to initiate hand hygiene but unable to finish task w/o cuing (pt placed hands under the faucet, but unable to sequence next step in hand hygiene). Pt discarded used paper towel in sink rather than waste basket. Noted difficulty in sequencing multi-step tasks and following 2+ step commands. Once bathroom ADLs completed, ambulated back to sit in bedside chair Min A with 2ww; pt noncompliant with 2ww safety cuing. Alarm armed at end of session.  Barriers to return to prior living situation: cognition, assist for transfers/mobility, unsafe judgment  Recommendations for discharge: TCU as pt is unsafe to return home at this time  Rationale for recommendations: Pt to benefit from continued IP OT services to maximize safety and independence in ADL and functional mobility tasks prior to safe d/c       Entered by: Mary Anne Bains 06/12/2018 8:28 AM

## 2018-06-12 NOTE — PLAN OF CARE
All VSS, LS clear and satting well on RA.  , tele: SR w/BBB.  Pt disoriented x4 but cooperative.  Receiving IV Rocephin for UTI.  No complaints of pain or nausea, no PRN's given.  PT and OT  Following.  Plan to d/c in 1-2 days.  Will continue with POC.

## 2018-06-12 NOTE — PROGRESS NOTES
"[facility]  Hospitalist Progress Note  Arturo Jauregui, DO 06/12/2018    Reason for Stay (Diagnosis): UTI.         Assessment and Plan:      Summary of Stay: Tina Powell is a 85 year old female with a past medical history significant for stroke, hypertension, and possible dementia who was admitted on 6/8/2018 with urinary tract infection.  Problem List:   1. Urinary tract infection.  Continue IV ceftriaxone.  Urine culture from outside facility with Klebsiella and group B strep.  2. Acute encephalopathy.  With suspected underlying chronic dementia.  Acute issues likely due to UTI.  Had been slowly improving.  Seems to have stabilized at this point with significant residual memory deficits.  Would recommend Neurology follow up in the outpatient setting in 7-10 days to further evaluate.  3. Previous stroke.  Continue clopidogrel and fenofibrate.  4. Hypertension.  Continue to hold hydrochlorothiazide.  IV hydralazine if needed.  5. GERD.  Omeprazole.  6. Depression.  Continue venlafaxine.  7. Chronic kidney disease.  Creatinine has been stable.  Avoid nephrotoxins as able.  8. Deconditioning.  Physical and Occupational Therapy consults.  DVT Prophylaxis: Pneumatic Compression Devices  Code Status: Full Code  Discharge Dispo: TCU.  Estimated Disch Date / # of Days until Disch: 1-2.        Interval History (Subjective):      Denies CP, SOB, F/C, N/V, or diarrhea.                  Physical Exam:      Last Vital Signs:  /73 (BP Location: Left arm)  Pulse 71  Temp 96.3  F (35.7  C) (Oral)  Resp 20  Ht 1.676 m (5' 6\")  Wt 77.7 kg (171 lb 3.2 oz)  SpO2 92%  BMI 27.63 kg/m2    Gen:  NAD, A&Ox1 to self.  Not oriented to place or time.  Eyes:  PERRL, sclera anicteric.  OP:  MMM, no lesions.  Neck:  Supple.  CV:  Regular, no murmurs.  Lung:  CTA b/l, normal effort.  Ab:  +BS, soft.  Skin:  Warm, dry to touch.  No rash.  Ext:  No pitting edema LE b/l.           Medications:      All " current medications were reviewed with changes reflected in problem list.         Data:      All new lab and imaging data was reviewed.   Labs:    Recent Labs  Lab 06/11/18  1012 06/10/18  0606   NA  --  141   POTASSIUM 4.0 3.6   CHLORIDE  --  107   CO2  --  28   ANIONGAP  --  6   GLC  --  95   BUN  --  12   CR  --  0.89   GFRESTIMATED  --  60*   GFRESTBLACK  --  72   MICHELLE  --  8.6       Recent Labs  Lab 06/10/18  0606   WBC 7.6   HGB 12.5   HCT 39.1   MCV 85         Imaging:   No results found for this or any previous visit (from the past 24 hour(s)).

## 2018-06-12 NOTE — PLAN OF CARE
Problem: Patient Care Overview  Goal: Plan of Care/Patient Progress Review  Outcome: Improving  Oriented to self only. Pleasant. Follows commands. Hearing aids x2. Glucoses 95 & 92. PO intake good, voiding w/o difficulty. Ambulates with staff & PT in halls, cues needed w/walker/gait belt. Tele NSR. IVAB. SW following for placement. ID following.     Addendum: Per PRAFUL, there is a bed available at [facility] for dc tomorrow if pt continues to be medically stable/cleared by MDs. Daughter updated and she will provide transport at discharge.

## 2018-06-12 NOTE — PLAN OF CARE
Problem: Patient Care Overview  Goal: Plan of Care/Patient Progress Review  Outcome: No Change  VSS, afebrile, tele SR; BG 95 and 122 this shift; alert and disoriented x 4; up with gaitbelt and walker, impulsive-bed alarm on for safety; PCDs off-on Plavix; Rocephin IV; plan to TCU at d/c

## 2018-06-13 VITALS
RESPIRATION RATE: 18 BRPM | TEMPERATURE: 98.1 F | SYSTOLIC BLOOD PRESSURE: 154 MMHG | HEART RATE: 71 BPM | OXYGEN SATURATION: 94 % | WEIGHT: 166.3 LBS | DIASTOLIC BLOOD PRESSURE: 78 MMHG | BODY MASS INDEX: 26.73 KG/M2 | HEIGHT: 66 IN

## 2018-06-13 LAB
GLUCOSE BLDC GLUCOMTR-MCNC: 91 MG/DL (ref 70–99)
GLUCOSE BLDC GLUCOMTR-MCNC: 96 MG/DL (ref 70–99)

## 2018-06-13 PROCEDURE — 99239 HOSP IP/OBS DSCHRG MGMT >30: CPT | Performed by: INTERNAL MEDICINE

## 2018-06-13 PROCEDURE — 25000132 ZZH RX MED GY IP 250 OP 250 PS 637: Mod: GY | Performed by: INTERNAL MEDICINE

## 2018-06-13 PROCEDURE — A9270 NON-COVERED ITEM OR SERVICE: HCPCS | Mod: GY | Performed by: INTERNAL MEDICINE

## 2018-06-13 PROCEDURE — 00000146 ZZHCL STATISTIC GLUCOSE BY METER IP

## 2018-06-13 RX ORDER — CEFUROXIME AXETIL 250 MG/1
500 TABLET ORAL EVERY 12 HOURS SCHEDULED
Status: DISCONTINUED | OUTPATIENT
Start: 2018-06-13 | End: 2018-06-13 | Stop reason: HOSPADM

## 2018-06-13 RX ORDER — CEFUROXIME AXETIL 500 MG/1
500 TABLET ORAL EVERY 12 HOURS
Qty: 4 TABLET | Refills: 0 | DISCHARGE
Start: 2018-06-13 | End: 2018-06-15

## 2018-06-13 RX ADMIN — FENOFIBRATE 54 MG: 54 TABLET ORAL at 07:58

## 2018-06-13 RX ADMIN — CEFUROXIME AXETIL 500 MG: 250 TABLET ORAL at 12:25

## 2018-06-13 RX ADMIN — VENLAFAXINE HYDROCHLORIDE 150 MG: 150 TABLET, EXTENDED RELEASE ORAL at 07:58

## 2018-06-13 RX ADMIN — OMEPRAZOLE 20 MG: 20 CAPSULE, DELAYED RELEASE ORAL at 07:58

## 2018-06-13 RX ADMIN — CLOPIDOGREL 75 MG: 75 TABLET, FILM COATED ORAL at 07:58

## 2018-06-13 ASSESSMENT — ACTIVITIES OF DAILY LIVING (ADL)
ADLS_ACUITY_SCORE: 19

## 2018-06-13 NOTE — PLAN OF CARE
Problem: Patient Care Overview  Goal: Plan of Care/Patient Progress Review  Outcome: No Change  VS stable. A & O x 1, disoriented to time, place and situation.   Tele: sinus rhythm.   Diet: regular.   Ambulates: with 1 and a walker.   B, 84  Pain:denies.   Abnormal assessments: incontinent at times. Patient is very forgetful and confused this shift.   Discharge plan: possible discharge tomorrow back to SNF. Will continue to monitor and review plan of care.

## 2018-06-13 NOTE — PROGRESS NOTES
Your information has been submitted on June 13th, 2018 at 03:03:48 PM CDT. The confirmation number is [id number]

## 2018-06-13 NOTE — DISCHARGE SUMMARY
Discharge Summary  Hospitalist Service      Tina Powell MRN# [id number]   YOB: 1932 Age: 85 year old     Date of Admission:  6/8/2018  Date of Discharge:  6/13/2018  Admitting Physician:  Moreno Carlson MD  Discharge Physician: Arturo Jauregui DO   Discharging Service: Hospitalist Service     Primary Provider: Dian Tian  Primary Care Physician Phone Number: [phone]         Discharge Diagnoses/Problem Oriented Hospital Course (Providers):    Tina Powell was admitted on 6/8/2018 by Moreno Carlson MD and I would refer you to their history and physical.  The following problems were addressed during her hospitalization:  1. Urinary tract infection.  Has been on IV ceftriaxone.  Urine culture from outside facility with Klebsiella and group B strep.  Now changed to Ceftin 500 mg twice a day for the next 2 days to complete 7 day course.  2. Acute encephalopathy.  With suspected underlying chronic dementia.  Acute issues likely due to UTI.  Had been slowly improving.  Seems to have stabilized at this point with significant residual memory deficits.  Would recommend Neurology follow up in the outpatient setting in 3 weeks to further evaluate.  3. Previous stroke.  Continue clopidogrel and fenofibrate.  4. Hypertension.  Continue to hold hydrochlorothiazide.  Primary care physician will need to reevaluate in the outpatient setting.  5. GERD.  Omeprazole.  6. Depression.  Continue venlafaxine.  7. Chronic kidney disease.  Creatinine has been stable.  Avoid nephrotoxins as able.  8. Deconditioning.  Physical and Occupational Therapy consults.  Discharge to transitional care unit.    # Discharge Pain Plan:   - Patient currently has NO PAIN and is not being prescribed pain medications on discharge.            Code Status:      Full Code         Pending Results:        Unresulted Labs Ordered in the Past 30 Days of this Admission     No orders  found from 4/9/2018 to 6/9/2018.               Discharge Instructions and Follow-Up:      Follow-up Appointments     Follow Up and recommended labs and tests       Follow up with primary care provider in 1 week.  The following labs/tests   are recommended: BMP and CBC in 7 days.  Follow up with Neurology in 3   weeks.                         Discharge Disposition:      Discharged to rehabilitation facility          Discharge Medications:        Current Discharge Medication List      START taking these medications    Details   cefuroxime (CEFTIN) 500 MG tablet Take 1 tablet (500 mg) by mouth every 12 hours for 2 days  Qty: 4 tablet, Refills: 0    Associated Diagnoses: Acute cystitis without hematuria         CONTINUE these medications which have NOT CHANGED    Details   acetaminophen (TYLENOL) 325 MG tablet Take 1 tablet (325 mg) by mouth every 6 hours as needed for mild pain  Qty: 20 tablet, Refills: 0    Associated Diagnoses: Sepsis (H)      cholecalciferol (VITAMIN D) 1000 UNIT tablet Take  3 tablets daily.  Qty: 100 tablet, Refills: 3    Associated Diagnoses: Low vitamin D level      clobetasol (TEMOVATE) 0.05 % ointment Apply 1 Application topically daily as needed Applies 2-3 times a week   Refills: 1      clopidogrel (PLAVIX) 75 MG tablet Take 1 tablet (75 mg) by mouth daily  Qty: 90 tablet, Refills: 3    Associated Diagnoses: Encounter for medication refill      cyanocobalamin (VITAMIN B12) 1000 MCG/ML injection Inject 1 mL into the muscle every 30 days      Magnesium 400 MG CAPS Take 1 tablet by mouth daily    Associated Diagnoses: Major depressive disorder, recurrent episode, moderate (H)      omega 3 1000 MG CAPS Take 3 grams daily  Qty: 90 capsule    Associated Diagnoses: Mixed hyperlipidemia      pyridoxine (VITAMIN B-6) 50 MG TABS Take 100 mg by mouth daily    Associated Diagnoses: Major depressive disorder, recurrent episode, moderate (H)      venlafaxine (EFFEXOR-XR) 75 MG 24 hr capsule Take 2  "capsules (150 mg) by mouth daily  Qty: 90 capsule, Refills: 3    Associated Diagnoses: Major depressive disorder, recurrent episode, moderate (H)      fenofibrate 48 MG tablet Take 1 tablet (48 mg) by mouth daily  Qty: 90 tablet, Refills: 0    Comments: Pharmacy,  If 48 mg is not covered please see if 54 mg would be covered under insurance and let us know.  Thanks.  Associated Diagnoses: Hypercholesteremia      omeprazole (PRILOSEC) 20 MG CR capsule Take 1 capsule (20 mg) by mouth daily  Qty: 30 capsule, Refills: 3    Associated Diagnoses: Chronic cough; Dyspepsia         STOP taking these medications       adalimumab (HUMIRA) 20 MG/0.4ML KIT Comments:   Reason for Stopping:         ALPRAZolam (XANAX) 0.5 MG tablet Comments:   Reason for Stopping:         ciprofloxacin (CIPRO) 250 MG tablet Comments:   Reason for Stopping:         Coenzyme Q10 (CO Q 10) 100 MG CAPS Comments:   Reason for Stopping:         hydrochlorothiazide 12.5 MG TABS tablet Comments:   Reason for Stopping:                    Allergies:         Allergies   Allergen Reactions     Sulfa Drugs Swelling     Hmg-Coa-R Inhibitors Other (See Comments)     Weakness, tingling  Zocor              Condition and Physical Exam on Discharge:      Discharge condition: Stable   Discharge vitals: Blood pressure 154/78, pulse 71, temperature 98.1  F (36.7  C), temperature source Oral, resp. rate 18, height 1.676 m (5' 6\"), weight 75.4 kg (166 lb 4.8 oz), SpO2 94 %, not currently breastfeeding.   Gen:  NAD, A&Ox1 to self.  Not oriented to place or time.  Eyes:  PERRL, sclera anicteric.  OP:  MMM, no lesions.  Neck:  Supple.  CV:  Regular, no murmurs.  Lung:  CTA b/l, normal effort.  Ab:  +BS, soft.  Skin:  Warm, dry to touch.  No rash.  Ext:  No pitting edema LE b/l.          Discharge Orders for Skilled Facility (from Discharge Orders):        After Care Instructions     Activity - Up with nursing assistance           Advance Diet as Tolerated       Follow this " diet upon discharge: Regular            General info for SNF       Length of Stay Estimate: Short Term Care: Estimated # of Days <30  Condition at Discharge: Improving  Level of care:skilled   Rehabilitation Potential: Good  Admission H&P remains valid and up-to-date: Yes  Recent Chemotherapy: N/A  Use Nursing Home Standing Orders: Yes            Mantoux instructions       Give two-step Mantoux (PPD) Per Facility Policy Yes                           Rehab orders for Skilled Facility (from Discharge Orders):      Referrals     Future Labs/Procedures    Occupational Therapy Adult Consult     Comments:    Evaluate and treat as clinically indicated.    Reason:  Weakness    Physical Therapy Adult Consult     Comments:    Evaluate and treat as clinically indicated.    Reason:  Weakness             Discharge Time:      I have spent 35 minutes with Ms. Powell and working on discharge on 6/13/18.        Image Results From This Hospital Stay (For Non-EPIC Providers):        Results for orders placed or performed during the hospital encounter of 06/08/18   CT Head w/o Contrast    Narrative    CT OF THE HEAD WITHOUT CONTRAST 6/8/2018 12:50 PM     COMPARISON: Head CT 8/5/2015.    HISTORY: History of CVA, mild headache, confusion, evaluate for bleed  or large new stroke.     TECHNIQUE: 5 mm thick axial CT images of the head were acquired  without IV contrast material.    FINDINGS:  There is moderate diffuse cerebral volume loss. There are  extensive confluent areas of decreased density in the cerebral white  matter bilaterally that are consistent with sequela of chronic small  vessel ischemic disease.     The ventricles and basal cisterns are within normal limits in  configuration given the degree of cerebral volume loss.  There is no  midline shift. There are no extra-axial fluid collections.     No intracranial hemorrhage, mass or recent infarct.    The visualized paranasal sinuses are well-aerated. There is no  mastoiditis.  There are no fractures of the visualized bones.       Impression    IMPRESSION:  Diffuse cerebral volume loss and cerebral white matter  changes consistent with chronic small vessel ischemic disease. No  evidence for acute intracranial pathology.       Radiation dose for this scan was reduced using automated exposure  control, adjustment of the mA and/or kV according to patient size, or  iterative reconstruction technique.    MILA SWEET MD           Most Recent Lab Results In EPIC (For Non-EPIC Providers):    Most Recent 3 CBC's:  Recent Labs   Lab Test  06/10/18   0606  06/08/18   1213  10/03/17   1122   WBC  7.6  10.5  9.2   HGB  12.5  14.2  14.5   MCV  85  83  86.9   PLT  285  383  269      Most Recent 3 BMP's:  Recent Labs   Lab Test  06/11/18   1012  06/10/18   0606  06/08/18   1213  10/03/17   1120   08/09/15   0748   NA   --   141  141  138   < >  138   POTASSIUM  4.0  3.6  3.8  4.5   < >  3.8   CHLORIDE   --   107  107  100   < >  105   CO2   --   28  26   --    --   27   BUN   --   12  15  12  14   < >  14   CR   --   0.89  1.00  0.84   < >  0.85   ANIONGAP   --   6  8   --    --   6   MICHELLE   --   8.6  9.4  9.4   < >  8.4*   GLC   --   95  74  101*   < >  96    < > = values in this interval not displayed.     Most Recent 3 Troponin's:No lab results found.  Most Recent 3 INR's:  Recent Labs   Lab Test  08/05/15   2315  12/26/13   1324   INR  1.18*  0.98     Most Recent 2 LFT's:  Recent Labs   Lab Test  06/10/18   0606  10/03/17   1120   AST  19  29   ALT  27  18   ALKPHOS  42  73   BILITOTAL  0.9  0.5     Most Recent Cholesterol Panel:  Recent Labs   Lab Test  12/14/17   1332   CHOL  251*   LDL  164*   HDL  32*   TRIG  277*     Most Recent 6 Bacteria Isolates From Any Culture (See EPIC Reports for Culture Details):  Recent Labs   Lab Test  08/06/15   0015  08/05/15   2317   CULT  Cultured on the 1st day of incubation: Escherichia coli Susceptibility testing   done on previous specimen  Critical  Value/Significant Value, preliminary result only, called to and read   back by Kenia Guillory RN at 1638 on 8.6.15. KD  *  Cultured on the 1st day of incubation: Escherichia coli  Critical Value/Significant Value, preliminary result only, called to and read   back by Kenia Guillory, @1550 on 8/6/15 HM  (Note)  POSITIVE for E. COLI by Sociusigene multiplex nucleic acid test. Final  identification and antimicrobial susceptibility testing will be  verified by standard methods.    Specimen tested with Verigene multiplex, gram-negative blood culture  nucleic acid test for the following targets: Acinetobacter sp.,  Citrobacter sp., Enterobacter sp., Proteus sp., E. coli, K.  pneumoniae/oxytoca, P. aeruginosa, and the following resistance  markers: CTXM, KPC, NDM, VIM, IMP and OXA.    Critical Value/Significant Value called to and read back by Kenia Guillory RN 5th floor 8/6/15 1850 dg  *  >100,000 colonies/mL Escherichia coli*     Most Recent TSH, T4 and HgbA1c:  Recent Labs   Lab Test  08/12/14   1125   T4  0.98

## 2018-06-13 NOTE — PLAN OF CARE
Problem: Patient Care Overview  Goal: Plan of Care/Patient Progress Review  Physical Therapy Discharge Summary    Reason for therapy discharge:    Discharged to transitional care facility.    Progress towards therapy goal(s). See goals on Care Plan in [facility] electronic health record for goal details.  Goals partially met.  Barriers to achieving goals:   needing min A for transfers and gait due to balance issues.    Therapy recommendation(s):    Continued therapy is recommended.  Rationale/Recommendations:  .. Pt is below baseline for functional mobility and strength. Pt would benefit from continued skilled therapy to address these deficits.

## 2018-06-13 NOTE — PLAN OF CARE
Problem: Confusion, Acute (Adult)  Goal: Identify Related Risk Factors and Signs and Symptoms  Related risk factors and signs and symptoms are identified upon initiation of Human Response Clinical Practice Guideline (CPG).   Outcome: No Change  All VSS, LS dim and satting well on RA.  Tele: SR, BG 96.  Pt disoriented x4 but redirectable. Receiving IV Rocephin for UTI.  PT and OT following.  Plan is to d/c in 1-2 days to TCU.  Will continue with POC.

## 2018-06-13 NOTE — PROGRESS NOTES
Discharge Planner   Discharge Plans in progress: Spoke with daughter. She plans to trasnport at 1330 to TCU   Barriers to discharge plan: None  Follow up plan:      06/13/18 1100   [facility] ([place]) [phone], Fax: [phone]          Entered by: Corinne C. White 06/13/2018 11:57 AM

## 2018-06-13 NOTE — PLAN OF CARE
Problem: Patient Care Overview  Goal: Plan of Care/Patient Progress Review  Occupational Therapy Discharge Summary    Reason for therapy discharge:    Discharged to transitional care facility.    Progress towards therapy goal(s). See goals on Care Plan in [facility] electronic health record for goal details.  Goals not met.  Barriers to achieving goals:   discharge from facility.    Therapy recommendation(s):    Continued OT services in TCU setting, to maximize safety and independence in ADLs and functional mobility

## 2018-06-13 NOTE — PROGRESS NOTES
Pt discharge to TCU after MD talked to daughter in room. Daughter transported pt after dc via w/c volunteer escort.

## 2018-06-14 ENCOUNTER — PATIENT OUTREACH (OUTPATIENT)
Dept: CARE COORDINATION | Facility: CLINIC | Age: 83
End: 2018-06-14

## 2018-06-14 NOTE — PROGRESS NOTES
Clinic Care Coordination Contact    Situation: Patient chart reviewed by care coordinator.    Background: [facility] received a referral to provide resources due to concerns related to confusion and weakness related to a UTI.     Assessment: Pt was hospitalized at [facility] from 6/8/2018-6/13/2018 and discharged to [facility] for restorative services.     Plan/Recommendations: No further outreaches will be made at this time unless a new referral is made or a change in the pt's status occurs. Pt is not in a shared savings program. Clinic staff to follow-up following discharge from TCU. [facility] CC will be available to answer any questions or concerns.    BRENDAN Gallagher  Clinic Care Coordinator  [phone]  [email or web address]

## 2018-06-15 ENCOUNTER — RECORDS - HEALTHEAST (OUTPATIENT)
Dept: LAB | Facility: CLINIC | Age: 83
End: 2018-06-15

## 2018-06-15 LAB
ALBUMIN UR-MCNC: ABNORMAL MG/DL
AMORPH CRY #/AREA URNS HPF: ABNORMAL /[HPF]
APPEARANCE UR: ABNORMAL
BACTERIA #/AREA URNS HPF: ABNORMAL HPF
BILIRUB UR QL STRIP: NEGATIVE
COLOR UR AUTO: YELLOW
GLUCOSE UR STRIP-MCNC: NEGATIVE MG/DL
HGB UR QL STRIP: NEGATIVE
KETONES UR STRIP-MCNC: NEGATIVE MG/DL
LEUKOCYTE ESTERASE UR QL STRIP: ABNORMAL
MUCOUS THREADS #/AREA URNS LPF: ABNORMAL LPF
NITRATE UR QL: NEGATIVE
PH UR STRIP: 6 [PH] (ref 4.5–8)
RBC #/AREA URNS AUTO: ABNORMAL HPF
SP GR UR STRIP: 1.02 (ref 1–1.03)
SQUAMOUS #/AREA URNS AUTO: ABNORMAL LPF
UROBILINOGEN UR STRIP-ACNC: ABNORMAL
WBC #/AREA URNS AUTO: ABNORMAL HPF

## 2018-06-16 LAB — BACTERIA SPEC CULT: NO GROWTH

## 2018-06-21 ENCOUNTER — RECORDS - HEALTHEAST (OUTPATIENT)
Dept: LAB | Facility: CLINIC | Age: 83
End: 2018-06-21

## 2018-06-21 ENCOUNTER — OFFICE VISIT (OUTPATIENT)
Dept: FAMILY MEDICINE | Facility: CLINIC | Age: 83
End: 2018-06-21

## 2018-06-21 VITALS
TEMPERATURE: 97.5 F | HEART RATE: 72 BPM | BODY MASS INDEX: 27.18 KG/M2 | WEIGHT: 168.4 LBS | RESPIRATION RATE: 12 BRPM | SYSTOLIC BLOOD PRESSURE: 104 MMHG | DIASTOLIC BLOOD PRESSURE: 66 MMHG

## 2018-06-21 DIAGNOSIS — F33.1 MAJOR DEPRESSIVE DISORDER, RECURRENT EPISODE, MODERATE (H): ICD-10-CM

## 2018-06-21 DIAGNOSIS — N30.00 ACUTE CYSTITIS WITHOUT HEMATURIA: ICD-10-CM

## 2018-06-21 DIAGNOSIS — R41.0 DISORIENTATION: Primary | ICD-10-CM

## 2018-06-21 DIAGNOSIS — Z86.73 H/O TIA (TRANSIENT ISCHEMIC ATTACK) AND STROKE: ICD-10-CM

## 2018-06-21 LAB
% GRANULOCYTES: 62.5 % (ref 42.2–75.2)
BACTERIA URINE: ABNORMAL
BILIRUB UR QL STRIP: ABNORMAL
BLOOD URINE DIP: ABNORMAL
CASTS/LPF: ABNORMAL
COLOR UR: YELLOW
CRYSTAL URINE: ABNORMAL
EPITHELIAL CELLS - QUEST: ABNORMAL
GLUCOSE UR STRIP-MCNC: ABNORMAL MG/DL
HCT VFR BLD AUTO: 41.1 % (ref 35–46)
HEMOGLOBIN: 13.3 G/DL (ref 11.8–15.5)
KETONES UR QL STRIP: ABNORMAL
LEUKOCYTE ESTERASE URINE DIP: ABNORMAL
LYMPHOCYTES NFR BLD AUTO: 29.1 % (ref 20.5–51.1)
MCH RBC QN AUTO: 26.7 PG (ref 27–31)
MCHC RBC AUTO-ENTMCNC: 32.3 G/DL (ref 33–37)
MCV RBC AUTO: 82.4 FL (ref 80–100)
MONOCYTES NFR BLD AUTO: 8.4 % (ref 1.7–9.3)
MUCOUS URINE: ABNORMAL
NITRITE UR QL STRIP: ABNORMAL
PH UR STRIP: 5.5 PH (ref 5–9)
PLATELET # BLD AUTO: 304 K/UL (ref 140–450)
PROT UR QL: ABNORMAL MG/DL (ref ?–0.01)
RBC # BLD AUTO: 4.99 X10/CMM (ref 3.7–5.2)
RBC URINE: ABNORMAL (ref 0–3)
SP GR UR STRIP: 1.02 (ref 1–1.02)
UROBILINOGEN UR QL STRIP: 0.2 EU/DL (ref 0.2–1)
WBC # BLD AUTO: 7.3 X10/CMM (ref 3.8–11)
WBC URINE: ABNORMAL (ref 0–3)

## 2018-06-21 PROCEDURE — 80048 BASIC METABOLIC PNL TOTAL CA: CPT | Mod: 90 | Performed by: NURSE PRACTITIONER

## 2018-06-21 PROCEDURE — 85025 COMPLETE CBC W/AUTO DIFF WBC: CPT | Performed by: NURSE PRACTITIONER

## 2018-06-21 PROCEDURE — 36415 COLL VENOUS BLD VENIPUNCTURE: CPT | Performed by: NURSE PRACTITIONER

## 2018-06-21 PROCEDURE — 81003 URINALYSIS AUTO W/O SCOPE: CPT | Performed by: NURSE PRACTITIONER

## 2018-06-21 PROCEDURE — 99214 OFFICE O/P EST MOD 30 MIN: CPT | Performed by: NURSE PRACTITIONER

## 2018-06-21 RX ORDER — VENLAFAXINE HYDROCHLORIDE 150 MG/1
150 CAPSULE, EXTENDED RELEASE ORAL DAILY
COMMUNITY
Start: 2018-06-08 | End: 2018-07-12 | Stop reason: SINTOL

## 2018-06-21 RX ORDER — HYDROCHLOROTHIAZIDE 12.5 MG/1
12.5 CAPSULE ORAL DAILY
COMMUNITY
Start: 2018-06-08 | End: 2018-08-08

## 2018-06-21 RX ORDER — CEPHALEXIN 500 MG/1
500 CAPSULE ORAL 2 TIMES DAILY
Qty: 20 CAPSULE | Refills: 0 | Status: SHIPPED | OUTPATIENT
Start: 2018-06-21 | End: 2018-07-10

## 2018-06-21 NOTE — MR AVS SNAPSHOT
After Visit Summary   6/21/2018    Tina Powell    MRN: [id number]           Patient Information     Date Of Birth          10/7/1932        Visit Information        Provider Department      6/21/2018 10:00 AM Sharon Alonzo APRN CNP [facility]        Today's Diagnoses     Disorientation    -  1    Major depressive disorder, recurrent episode, moderate (H)        H/O TIA (transient ischemic attack) and stroke        Acute cystitis without hematuria           Follow-ups after your visit        Additional Services     NEUROLOGY ADULT REFERRAL       Your provider has referred you for the following:   Consult at [facility]: [place] [facility] Neurology [facility] [phone]   [email or web address]    Please be aware that coverage of these services is subject to the terms and limitations of your health insurance plan.  Call member services at your health plan with any benefit or coverage questions.      Please bring the following with you to your appointment:    (1) Any X-Rays, CTs or MRIs which have been performed.  Contact the facility where they were done to arrange for  prior to your scheduled appointment.    (2) List of current medications  (3) This referral request   (4) Any documents/labs given to you for this referral                  Who to contact     If you have questions or need follow up information about today's clinic visit or your schedule please contact [facility] directly at [phone].  Normal or non-critical lab and imaging results will be communicated to you by MyChart, letter or phone within 4 business days after the clinic has received the results. If you do not hear from us within 7 days, please contact the clinic through MyChart or phone. If you have a critical or abnormal lab result, we will notify you by phone as soon as possible.  Submit refill requests through [employer] or  call your pharmacy and they will forward the refill request to us. Please allow 3 business days for your refill to be completed.          Additional Information About Your Visit        Care EveryWhere ID     This is your Care EveryWhere ID. This could be used by other organizations to access your [place] medical records  [id number]        Your Vitals Were     Pulse Temperature Respirations BMI (Body Mass Index)          72 97.5  F (36.4  C) (Oral) 12 27.18 kg/m2         Blood Pressure from Last 3 Encounters:   06/21/18 104/66   06/13/18 154/78   06/07/18 106/72    Weight from Last 3 Encounters:   06/21/18 76.4 kg (168 lb 6.4 oz)   06/13/18 75.4 kg (166 lb 4.8 oz)   06/07/18 75 kg (165 lb 6.4 oz)              We Performed the Following     Basic Metabolic Panel (8) ([facility])     CBC with Diff/Plt ([facility])     NEUROLOGY ADULT REFERRAL     Urinalysis w/reflex protein, bili ([facility])     Urine Culture  Routine ([facility])          Today's Medication Changes          These changes are accurate as of 6/21/18 11:12 AM.  If you have any questions, ask your nurse or doctor.               Start taking these medicines.        Dose/Directions    cephALEXin 500 MG capsule   Commonly known as:  KEFLEX   Used for:  Acute cystitis without hematuria   Started by:  Sharon Alonzo APRN CNP        Dose:  500 mg   Take 1 capsule (500 mg) by mouth 2 times daily   Quantity:  20 capsule   Refills:  0            Where to get your medicines      Some of these will need a paper prescription and others can be bought over the counter.  Ask your nurse if you have questions.     Bring a paper prescription for each of these medications     cephALEXin 500 MG capsule                Primary Care Provider Office Phone # Fax #    Dian Tian [phone] [address] [place] [address]        Equal Access to Services     JOSE CAMP : Mallika Evans, shi kingston, grace goldstein  cele soaresreal gelypita hydeaan ah. So [facility] [phone].    ATENCIÓN: Si corettala ana, tiene a marquez disposición servicios gratuitos de asistencia lingüística. Morris al [phone].    We comply with applicable federal civil rights laws and [place] laws. We do not discriminate on the basis of race, color, national origin, age, disability, sex, sexual orientation, or gender identity.            Thank you!     Thank you for choosing [facility]  for your care. Our goal is always to provide you with excellent care. Hearing back from our patients is one way we can continue to improve our services. Please take a few minutes to complete the written survey that you may receive in the mail after your visit with us. Thank you!             Your Updated Medication List - Protect others around you: Learn how to safely use, store and throw away your medicines at www.disposemymeds.org.          This list is accurate as of 6/21/18 11:12 AM.  Always use your most recent med list.                   Brand Name Dispense Instructions for use Diagnosis    acetaminophen 325 MG tablet    TYLENOL    20 tablet    Take 1 tablet (325 mg) by mouth every 6 hours as needed for mild pain    Sepsis (H)       adalimumab 20 MG/0.4ML prefilled syringe kit    HUMIRA     Inject 1 Syringe Subcutaneous        cephALEXin 500 MG capsule    KEFLEX    20 capsule    Take 1 capsule (500 mg) by mouth 2 times daily    Acute cystitis without hematuria       cholecalciferol 1000 UNIT tablet    vitamin D3    100 tablet    Take  3 tablets daily.    Low vitamin D level       clobetasol 0.05 % ointment    TEMOVATE     Apply 1 Application topically daily as needed Applies 2-3 times a week        clopidogrel 75 MG tablet    PLAVIX    90 tablet    Take 1 tablet (75 mg) by mouth daily    Encounter for medication refill       cyanocobalamin 1000 MCG/ML injection    VITAMIN B12     Inject 1 mL into the muscle every 30 days        fenofibrate  48 MG tablet     90 tablet    Take 1 tablet (48 mg) by mouth daily    Hypercholesteremia       hydrochlorothiazide 12.5 MG capsule    MICROZIDE     Take 12.5 mg by mouth daily        Magnesium 400 MG Caps      Take 1 tablet by mouth daily    Major depressive disorder, recurrent episode, moderate (H)       omega 3 1000 MG Caps     90 capsule    Take 3 grams daily    Mixed hyperlipidemia       omeprazole 20 MG CR capsule    priLOSEC    30 capsule    Take 1 capsule (20 mg) by mouth daily    Chronic cough, Dyspepsia       pyridoxine 50 MG Tabs    VITAMIN B-6     Take 100 mg by mouth daily    Major depressive disorder, recurrent episode, moderate (H)       * venlafaxine 75 MG 24 hr capsule    EFFEXOR-XR    90 capsule    Take 2 capsules (150 mg) by mouth daily    Major depressive disorder, recurrent episode, moderate (H)       * venlafaxine 150 MG 24 hr capsule    EFFEXOR-XR     Take 150 mg by mouth daily        * Notice:  This list has 2 medication(s) that are the same as other medications prescribed for you. Read the directions carefully, and ask your doctor or other care provider to review them with you.

## 2018-06-21 NOTE — PROGRESS NOTES
Problem(s) Oriented visit        SUBJECTIVE:                                                    Tina Powell is a 85 year old female who presents to clinic today for the following health issues :  Here with Daughter, 2 weeks ago admitted to ER for UTI and confusion, in hospital 5 days and D/Cd to TCU ([facility]). Had 5 days rocephin and cipro. Got some better than worse in last 4 days. Now acute disorientation, not know family members, last name, location. Cries all the time and tired.  Daughter thinks maybe infection. Urine checked last Friday and was normal. Daughter says Going backwards and getting worse but TCU wouldn't check.. Was completly independent 2 weeks ago. History of TIA. Effexor increased 6/7 to 150mg but even with increase dose, still increased crying episodes last 4 days.                 Problem list, Medication list, Allergies, and Medical/Social/Surgical histories reviewed in EPIC and updated as appropriate.   Additional history: as documented    ROS:  10 point ROS completed and negative except noted above, including Gen, HEENT, CV, Resp, GI, , MS, Neurologic, Psych    Histories:   Patient Active Problem List   Diagnosis     Psoriasis     Dyslipidemia     Health Care Home     Advance Care Planning     H/O: CVA (cerebrovascular accident)     Cerebral infarction (H)     Risk for falls     Sepsis (H)     Major depressive disorder, recurrent episode, moderate (H)     Alcoholism (H)     Tobacco abuse     Encephalopathy     Past Surgical History:   Procedure Laterality Date     HYSTERECTOMY  1950    fibroids       Social History   Substance Use Topics     Smoking status: Former Smoker     Quit date: 5/31/2018     Smokeless tobacco: Never Used      Comment: Quit last Thursday     Alcohol use 0.0 - 1.2 oz/week     0 - 2 Glasses of wine per week     Family History   Problem Relation Age of Onset     HEART DISEASE Mother      Cerebrovascular Disease Father            OBJECTIVE:                                                     /66  Pulse 72  Temp 97.5  F (36.4  C) (Oral)  Resp 12  Wt 76.4 kg (168 lb 6.4 oz)  BMI 27.18 kg/m2  Body mass index is 27.18 kg/(m^2).   APPEARANCE: = Relaxed and in no distress  Conj/Eyelids = noninjected and lids and lashes are without inflammation  PERRLA/Irises = Pupils Round Reactive to Light and Irisis without inflammation  Ears/Nose = External structures and Nares have usual shape and form  Neck = No anterior or posterior adenopathy appreciated.  Thyroid = Not enlarged and no masses felt  Resp effort = Calm regular breathing  Breath Sounds = Good air movement with no rales or rhonchi in any lung fields  Heart Rate, Rhythm, & sounds (no Murm)  = Regular rate and rhythm with no S3, S4, or murmur appreciated.  Abdomen = Soft, nontender, no masses, & bowel sounds in all quadrants  Ext (edema) = No pretibial edema noted or elsewhere  Musculsktl =  Strength and ROM of major joints are within normal limits  Recent/Remote Memory = alert, not oriented to place or time  Mood/Affect = Cooperative and interested  Mood/Affect =  affect normal/bright, confused and disoriented         Results for orders placed or performed in visit on 06/21/18   CBC with Diff/Plt ([facility])   Result Value Ref Range    WBC x10/cmm 7.3 3.8 - 11.0 x10/cmm    % Lymphocytes 29.1 20.5 - 51.1 %    % Monocytes 8.4 1.7 - 9.3 %    % Granulocytes 62.5 42.2 - 75.2 %    RBC x10/cmm 4.99 3.7 - 5.2 x10/cmm    Hemoglobin 13.3 11.8 - 15.5 g/dl    Hematocrit 41.1 35 - 46 %    MCV 82.4 80 - 100 fL    MCH 26.7 (A) 27.0 - 31.0 pg    MCHC 32.3 (A) 33.0 - 37.0 g/dL    Platelet Count 304 140 - 450 K/uL   Urinalysis w/reflex protein, bili ([facility])   Result Value Ref Range    Color Urine Yellow     pH Urine 5.5 5 - 9 pH    Specific Gravity Urine 1.025 1.005 - 1.025    Protein Urine + (A) 0.01 mg/dL    Glucose Urine neg     Ketones Urine trace (A)     Leukocyte Esterase Urine 1+ (A)     Blood Urine neg     Nitrite Urine Neg NEG     Bilirubin Urine Dip 1+ (A)     Urobilinogen Urine 0.2 0.2 - 1.0 EU/dL    WBC Urine 10-20 (A) 0 - 3    RBC Urine rare 0 - 3    Epithelial Cells some     Crystal Urine neg     Bacteria Urine some (A)     Mucous Urine neg     Casts/LPF few (A)        1. Disorientation  - CBC with Diff/Plt ([facility])- WBC normal  - Urinalysis w/reflex protein, bili ([facility])-treated with keflex 500 bid x 10d  UC pending.    Call daughter with results    [phone] -[facility]-check electrolytes  - NEUROLOGY ADULT REFERRAL    2. Major depressive disorder, recurrent episode, moderate (H)  Effexor was taking 150.  Decrease to 75 plus 37.5 for a week, then 75 if notices a difference.     3. H/O TIA (transient ischemic attack) and stroke  - NEUROLOGY ADULT REFERRAL    FUTURE APPOINTMENTS:       - Follow-up for annual visit or as needed    The following health maintenance items are reviewed in Epic and correct as of today:  Health Maintenance   Topic Date Due     DEPRESSION ACTION PLAN Q1 YR  07/26/2018     PHQ-9 Q6 MONTHS  12/07/2018     FALL RISK ASSESSMENT  06/07/2019     TETANUS IMMUNIZATION (SYSTEM ASSIGNED)  07/27/2020     ADVANCE DIRECTIVE PLANNING Q5 YRS  11/10/2020     PNEUMOCOCCAL  Completed     INFLUENZA VACCINE  Completed       MAURICE Bang CNP  [facility]  Family Practice  [facility]  [phone]    For any issues my office # is [phone]

## 2018-06-22 LAB
BUN SERPL-MCNC: 16 MG/DL (ref 8–27)
BUN/CREATININE RATIO: 18 (ref 12–28)
CALCIUM SERPL-MCNC: 9.8 MG/DL (ref 8.7–10.3)
CHLORIDE SERPLBLD-SCNC: 105 MMOL/L (ref 96–106)
CREAT SERPL-MCNC: 0.87 MG/DL (ref 0.57–1)
EGFR IF AFRICN AM: 70 ML/MIN/1.73
EGFR IF NONAFRICN AM: 61 ML/MIN/1.73
GLUCOSE SERPL-MCNC: 125 MG/DL (ref 65–99)
MAGNESIUM SERPL-MCNC: 1.7 MG/DL (ref 1.8–2.6)
POTASSIUM SERPL-SCNC: 4.1 MMOL/L (ref 3.5–5.2)
SODIUM SERPL-SCNC: 141 MMOL/L (ref 134–144)
TOTAL CO2: 23 MMOL/L (ref 20–29)
VIT B12 SERPL-MCNC: 926 PG/ML (ref 213–816)

## 2018-06-24 LAB
ANTIMICROBIAL SUSCEPTIBILITY: ABNORMAL
Lab: ABNORMAL
URINE CULTURE: ABNORMAL

## 2018-06-26 ENCOUNTER — TELEPHONE (OUTPATIENT)
Dept: FAMILY MEDICINE | Facility: CLINIC | Age: 83
End: 2018-06-26

## 2018-06-26 RX ORDER — NITROFURANTOIN 25; 75 MG/1; MG/1
100 CAPSULE ORAL 2 TIMES DAILY
Qty: 14 CAPSULE | Refills: 0 | Status: SHIPPED | OUTPATIENT
Start: 2018-06-26 | End: 2018-07-10

## 2018-06-26 NOTE — TELEPHONE ENCOUNTER
Pt is on keflex but UC only showed susceptibility to nitofurantoin and vancomycin. Can you please send in a script for Macrobid 100mg BID x 7 days and have her stop keflex.    Per last note about med change, Call daughter with results    [phone]   Per Daughter Rosario, patient is still at  [facility]  Fax hard copy prescription to [phone]    Spoke with Sharon RANGEL to print out prescription   Ashlyn Dowell MA June 26, 2018 9:52 AM

## 2018-06-26 NOTE — PROGRESS NOTES
"Results called  to patient\"s daughter, Rosario, today @  [phone]  Patient is at [facility]- faxed prescription- med change to [phone] brittany Dowell MA June 26, 2018    "

## 2018-06-27 ENCOUNTER — RECORDS - HEALTHEAST (OUTPATIENT)
Dept: LAB | Facility: CLINIC | Age: 83
End: 2018-06-27

## 2018-06-28 LAB
ANION GAP SERPL CALCULATED.3IONS-SCNC: 7 MMOL/L (ref 5–18)
BUN SERPL-MCNC: 13 MG/DL (ref 8–28)
CALCIUM SERPL-MCNC: 9.7 MG/DL (ref 8.5–10.5)
CHLORIDE BLD-SCNC: 109 MMOL/L (ref 98–107)
CO2 SERPL-SCNC: 27 MMOL/L (ref 22–31)
CREAT SERPL-MCNC: 0.97 MG/DL (ref 0.6–1.1)
ERYTHROCYTE [DISTWIDTH] IN BLOOD BY AUTOMATED COUNT: 14.1 % (ref 11–14.5)
GFR SERPL CREATININE-BSD FRML MDRD: 55 ML/MIN/1.73M2
GLUCOSE BLD-MCNC: 100 MG/DL (ref 70–125)
HCT VFR BLD AUTO: 42.6 % (ref 35–47)
HGB BLD-MCNC: 13.3 G/DL (ref 12–16)
MCH RBC QN AUTO: 26.9 PG (ref 27–34)
MCHC RBC AUTO-ENTMCNC: 31.2 G/DL (ref 32–36)
MCV RBC AUTO: 86 FL (ref 80–100)
PLATELET # BLD AUTO: 363 THOU/UL (ref 140–440)
PMV BLD AUTO: 11.1 FL (ref 8.5–12.5)
POTASSIUM BLD-SCNC: 4 MMOL/L (ref 3.5–5)
RBC # BLD AUTO: 4.94 MILL/UL (ref 3.8–5.4)
SODIUM SERPL-SCNC: 143 MMOL/L (ref 136–145)
WBC: 8.2 THOU/UL (ref 4–11)

## 2018-07-03 ENCOUNTER — PATIENT OUTREACH (OUTPATIENT)
Dept: CARE COORDINATION | Facility: CLINIC | Age: 83
End: 2018-07-03

## 2018-07-03 NOTE — PROGRESS NOTES
Clinic Care Coordination Contact  Care Team Conversations    SW received voicemail message from [facility]. Per voicemail, patient's daughter called the clinic and is concerned regarding her mother. [facility] and have stated to the daughter that they feel her mom requires a memory care placement. Daughter believes that patient is delirious or some other medical issue is going unresolved as three weeks ago her mom was driving and independent. Daughter was instructed by clinic to talk with the TCU  and discuss other placement options for her mom. Daughter stated that the  at the TCU has not been helpful and that she has arranged some other tours of places on her own.     SW contacted RN at [facility] and discussed the patient's situation. SW will outreach to daughter to discuss options.

## 2018-07-03 NOTE — PROGRESS NOTES
Clinic Care Coordination Contact  [facility]/Voicemail       Clinical Data: Care Coordinator Outreach   Outreach attempted x 1 to patient's daughter, Brittanie. Left message on voicemail with call back information and requested return call.  Plan: Care Coordinator will try to reach daughter again in 1-2 business days.    Lisy Blanc, [facility]  Social Work Care Coordinator   [facility] [place]  [phone]

## 2018-07-09 ENCOUNTER — RECORDS - HEALTHEAST (OUTPATIENT)
Dept: LAB | Facility: CLINIC | Age: 83
End: 2018-07-09

## 2018-07-09 LAB
ALBUMIN UR-MCNC: NEGATIVE MG/DL
AMORPH CRY #/AREA URNS HPF: ABNORMAL /[HPF]
APPEARANCE UR: ABNORMAL
BACTERIA #/AREA URNS HPF: ABNORMAL HPF
BILIRUB UR QL STRIP: NEGATIVE
COLOR UR AUTO: YELLOW
GLUCOSE UR STRIP-MCNC: NEGATIVE MG/DL
HGB UR QL STRIP: NEGATIVE
KETONES UR STRIP-MCNC: NEGATIVE MG/DL
LEUKOCYTE ESTERASE UR QL STRIP: NEGATIVE
NITRATE UR QL: NEGATIVE
PH UR STRIP: 7.5 [PH] (ref 4.5–8)
RBC #/AREA URNS AUTO: ABNORMAL HPF
SP GR UR STRIP: 1.01 (ref 1–1.03)
SQUAMOUS #/AREA URNS AUTO: ABNORMAL LPF
TRANS CELLS #/AREA URNS HPF: ABNORMAL LPF
UROBILINOGEN UR STRIP-ACNC: ABNORMAL
WBC #/AREA URNS AUTO: ABNORMAL HPF

## 2018-07-10 ENCOUNTER — TRANSFERRED RECORDS (OUTPATIENT)
Dept: FAMILY MEDICINE | Facility: CLINIC | Age: 83
End: 2018-07-10

## 2018-07-10 ENCOUNTER — TELEPHONE (OUTPATIENT)
Dept: FAMILY MEDICINE | Facility: CLINIC | Age: 83
End: 2018-07-10

## 2018-07-10 NOTE — TELEPHONE ENCOUNTER
Brittanie, daughter, calls reporting patient was discharged from [facility] today and moved into [facility] in [place].   Brittanie states med list [facility] is not up-to-date.   States current venlafaxine dose is 112.5mg QD (75mg+37.5mg). [facility] dose was increased to 150mg QD at 6/8/18 appt with Sharon Alonzo CNP, but patient did not tolerate so they went back to the 112.5mg QD.   Also questioning HCTZ 12.5mg QD - states this is not on her med list at [facility]. Wants this clarified.   Also questioning Omega-3. States order they have is 100mg sig: 3gm QD -- suspect should be 1000 mg sig: 3 po QD. On EPIC med list= 3gm QD.     Called [facility]  and left message for PRAFUL Houser to fax us discharge med list.   Leidy Sharma RN

## 2018-07-11 ENCOUNTER — MEDICAL CORRESPONDENCE (OUTPATIENT)
Dept: FAMILY MEDICINE | Facility: CLINIC | Age: 83
End: 2018-07-11

## 2018-07-12 ENCOUNTER — TRANSFERRED RECORDS (OUTPATIENT)
Dept: FAMILY MEDICINE | Facility: CLINIC | Age: 83
End: 2018-07-12

## 2018-07-12 ENCOUNTER — RECORDS - HEALTHEAST (OUTPATIENT)
Dept: LAB | Facility: CLINIC | Age: 83
End: 2018-07-12

## 2018-07-12 LAB
ALBUMIN UR-MCNC: NEGATIVE MG/DL
APPEARANCE UR: CLEAR
BACTERIA #/AREA URNS HPF: ABNORMAL HPF
BILIRUB UR QL STRIP: NEGATIVE
COLOR UR AUTO: YELLOW
GLUCOSE UR STRIP-MCNC: NEGATIVE MG/DL
HGB UR QL STRIP: NEGATIVE
KETONES UR STRIP-MCNC: NEGATIVE MG/DL
LEUKOCYTE ESTERASE UR QL STRIP: ABNORMAL
MUCOUS THREADS #/AREA URNS LPF: ABNORMAL LPF
NITRATE UR QL: NEGATIVE
PH UR STRIP: 6.5 [PH] (ref 4.5–8)
RBC #/AREA URNS AUTO: ABNORMAL HPF
SP GR UR STRIP: 1.02 (ref 1–1.03)
SQUAMOUS #/AREA URNS AUTO: ABNORMAL LPF
UROBILINOGEN UR STRIP-ACNC: ABNORMAL
WBC #/AREA URNS AUTO: ABNORMAL HPF

## 2018-07-13 ENCOUNTER — TELEPHONE (OUTPATIENT)
Dept: FAMILY MEDICINE | Facility: CLINIC | Age: 83
End: 2018-07-13

## 2018-07-13 DIAGNOSIS — R46.89 BEHAVIORAL CHANGE: Primary | ICD-10-CM

## 2018-07-13 DIAGNOSIS — R30.0 DYSURIA: ICD-10-CM

## 2018-07-13 LAB — BACTERIA SPEC CULT: ABNORMAL

## 2018-07-13 RX ORDER — NITROFURANTOIN 25; 75 MG/1; MG/1
100 CAPSULE ORAL 2 TIMES DAILY
Qty: 14 CAPSULE | Refills: 0 | Status: SHIPPED | OUTPATIENT
Start: 2018-07-13

## 2018-07-13 RX ORDER — QUETIAPINE FUMARATE 25 MG/1
12.5 TABLET, FILM COATED ORAL DAILY PRN
Qty: 15 TABLET | Refills: 1 | Status: SHIPPED | OUTPATIENT
Start: 2018-07-13 | End: 2018-07-17

## 2018-07-13 NOTE — TELEPHONE ENCOUNTER
Patient daughter called requesting prescription for Xanax for sundowning type behaviors at [facility].  Per Dr. Tian patient is OK to have PRN Seroquel 12.5mg daily.  This prescription is sent to pharmacy and daughter is notified.  Ruba Stokes   UA also receive and reviewed by MAURICE Johansen CNP.  Will treat with macrobid recent UC showed susceptibility to this.  Prescription sent to pharmacy and orders faxed to [facility].  Ruba Stokes

## 2018-07-14 LAB — BACTERIA SPEC CULT: NORMAL

## 2018-07-17 ENCOUNTER — MEDICAL CORRESPONDENCE (OUTPATIENT)
Dept: FAMILY MEDICINE | Facility: CLINIC | Age: 83
End: 2018-07-17

## 2018-07-17 ENCOUNTER — TELEPHONE (OUTPATIENT)
Dept: FAMILY MEDICINE | Facility: CLINIC | Age: 83
End: 2018-07-17

## 2018-07-17 DIAGNOSIS — F33.1 MAJOR DEPRESSIVE DISORDER, RECURRENT EPISODE, MODERATE (H): ICD-10-CM

## 2018-07-17 DIAGNOSIS — R46.89 BEHAVIORAL CHANGE: ICD-10-CM

## 2018-07-17 DIAGNOSIS — R45.1 AGITATION: Primary | ICD-10-CM

## 2018-07-17 DIAGNOSIS — I10 BENIGN ESSENTIAL HYPERTENSION: ICD-10-CM

## 2018-07-17 RX ORDER — HYDROCHLOROTHIAZIDE 12.5 MG/1
12.5 TABLET ORAL DAILY
Qty: 30 TABLET | Refills: 1 | Status: SHIPPED | OUTPATIENT
Start: 2018-07-17

## 2018-07-17 RX ORDER — VENLAFAXINE HYDROCHLORIDE 75 MG/1
75 CAPSULE, EXTENDED RELEASE ORAL DAILY
Qty: 30 CAPSULE | Refills: 0 | COMMUNITY
Start: 2018-07-12 | End: 2018-08-02

## 2018-07-17 RX ORDER — VENLAFAXINE HYDROCHLORIDE 37.5 MG/1
CAPSULE, EXTENDED RELEASE ORAL
Qty: 30 CAPSULE | Refills: 0 | COMMUNITY
Start: 2018-07-12 | End: 2018-08-02

## 2018-07-17 RX ORDER — QUETIAPINE FUMARATE 25 MG/1
12.5 TABLET, FILM COATED ORAL 2 TIMES DAILY PRN
Qty: 30 TABLET | Refills: 0 | Status: SHIPPED | OUTPATIENT
Start: 2018-07-17 | End: 2018-08-08

## 2018-07-17 NOTE — TELEPHONE ENCOUNTER
18 following message sent to Dr. Tian:   Patient moved from U to [facility] last week. Daughter had called yesterday regardin. Quetiapine 12.5mg QD prn ordered 18 for behavior/agitation issues. So far only used once on 18. Daughter worried regarding possibility of needing more than one per day - if they use it in afternoon with -type symptoms then during night has problems again and they don't have med order to help. She asks if Dr. Tian might order it BID prn?   2. Also reports BP's running 150-160/90. Not on any BP meds. She asks if HCTZ should be restarted? Or other med?   Looks like HCTZ 12.5mg QD was dc'd while in hospital last month. I called TCU last week on this med because daughter was sure she was getting in TCU, nurse there confirmed did not come to them with HCTZ on med list.     Today home care RN, Perez with [facility] , calls reporting b yesterday was 164/92 and today 160/80.   Per their current parameters, need to notify MD if BP >160/90.     Plan: per Dr. Tian -   1. ok for order to use quetiapine 12.5mg BID prn agitation   2. Restart HCTZ 12.5mg QD and check BMP in 1 month.   Signed orders faxed to nursing office at [facility] and also left message with verbal orders on home care RN's voicemail. [facility]  Leidy Sharma RN

## 2018-07-17 NOTE — PROGRESS NOTES
Clinic Care Coordination Contact  Care Team Conversations    SW spoke with patient's daughter who states she moved her mom into an detention on Friday 7/6. She denies any current needs or concerns. SW will do no further outreach at this time.    Lisy Blanc [facility]  Social Work Care Coordinator   [facility] [place]  [phone]

## 2018-07-21 ENCOUNTER — MEDICAL CORRESPONDENCE (OUTPATIENT)
Dept: FAMILY MEDICINE | Facility: CLINIC | Age: 83
End: 2018-07-21

## 2018-08-02 DIAGNOSIS — F33.1 MAJOR DEPRESSIVE DISORDER, RECURRENT EPISODE, MODERATE (H): ICD-10-CM

## 2018-08-02 RX ORDER — VENLAFAXINE HYDROCHLORIDE 75 MG/1
CAPSULE, EXTENDED RELEASE ORAL
Qty: 30 CAPSULE | Refills: 3 | Status: SHIPPED | OUTPATIENT
Start: 2018-08-02

## 2018-08-02 RX ORDER — VENLAFAXINE HYDROCHLORIDE 37.5 MG/1
CAPSULE, EXTENDED RELEASE ORAL
Qty: 30 CAPSULE | Refills: 3 | Status: SHIPPED | OUTPATIENT
Start: 2018-08-02

## 2018-08-07 ENCOUNTER — APPOINTMENT (OUTPATIENT)
Dept: CT IMAGING | Facility: CLINIC | Age: 83
End: 2018-08-07
Attending: EMERGENCY MEDICINE
Payer: MEDICARE

## 2018-08-07 ENCOUNTER — HOSPITAL ENCOUNTER (EMERGENCY)
Facility: CLINIC | Age: 83
Discharge: HOME | End: 2018-08-07
Attending: EMERGENCY MEDICINE | Admitting: EMERGENCY MEDICINE
Payer: MEDICARE

## 2018-08-07 VITALS
RESPIRATION RATE: 14 BRPM | TEMPERATURE: 98.3 F | OXYGEN SATURATION: 98 % | SYSTOLIC BLOOD PRESSURE: 178 MMHG | DIASTOLIC BLOOD PRESSURE: 102 MMHG
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
## 2018-08-07 DIAGNOSIS — S00.83XA TRAUMATIC HEMATOMA OF FOREHEAD, INITIAL ENCOUNTER: ICD-10-CM

## 2018-08-07 PROCEDURE — 70450 CT HEAD/BRAIN W/O DYE: CPT

## 2018-08-07 PROCEDURE — 99285 EMERGENCY DEPT VISIT HI MDM: CPT | Mod: 25

## 2018-08-07 PROCEDURE — 72125 CT NECK SPINE W/O DYE: CPT

## 2018-08-07 PROCEDURE — 25000128 H RX IP 250 OP 636: Performed by: EMERGENCY MEDICINE

## 2018-08-07 PROCEDURE — 96374 THER/PROPH/DIAG INJ IV PUSH: CPT

## 2018-08-07 PROCEDURE — 70486 CT MAXILLOFACIAL W/O DYE: CPT

## 2018-08-07 RX ORDER — FENTANYL CITRATE 50 UG/ML
50 INJECTION, SOLUTION INTRAMUSCULAR; INTRAVENOUS ONCE
Status: COMPLETED | OUTPATIENT
Start: 2018-08-07 | End: 2018-08-07

## 2018-08-07 RX ORDER — HYDROCODONE BITARTRATE AND ACETAMINOPHEN 5; 325 MG/1; MG/1
1 TABLET ORAL EVERY 6 HOURS PRN
Qty: 8 TABLET | Refills: 0 | Status: SHIPPED | OUTPATIENT
Start: 2018-08-07

## 2018-08-07 RX ADMIN — FENTANYL CITRATE 50 MCG: 50 INJECTION INTRAMUSCULAR; INTRAVENOUS at 14:38

## 2018-08-07 NOTE — ED PROVIDER NOTES
History     Chief Complaint:  Fall    HPI   Tina Powell is a 85 year old anticoagulated female who presents with family via EMS for evaluation after a fall. EMS reports that the patient was eating lunch with her family at a restaurant, when she attempted to get out of her zamora and reach her walker. She fell in the process of getting up and landed on her right knee and head. The patient developed a hematoma secondary to the fall on the right side of her head above her right eye socket. Additionally, she developed right sided head pain secondary to the fall. The patient is also complaining of knee pain, although she ambulated to the ambulance well with assistance. She denies all other concerns here in the ER today. Of note, the patient is anticoagulated on Plavix.      Allergies:  Sulfa drugs  HMG-coa-R    Medications:    Humira  Vitamin D  Temovate  Plavix  Microzide  Magnesium  Macrobid  Effexor  Seroquel  Prilosec    Past Medical History:    Depressed  Dyslipidemia  CVA  HTN  Hyperlipidemia  Psoriases  Risk for falls  Urosepsis  Cerebral infarction  Encephalopathy  Alcoholism    Past Surgical History:    Hysterectomy    Family History:    Heart disease  Cerebrovascular disease    Social History:  Marital Status:   [4]  Former smoker: quit three months ago  Alcohol: 2 glasses of wine per week      Review of Systems   Musculoskeletal:        Knee pain  Head pain   All other systems reviewed and are negative.      Physical Exam     Patient Vitals for the past 24 hrs:   BP Temp Heart Rate Resp SpO2   08/07/18 1626 - - 82 14 98 %   08/07/18 1434 (!) 178/102 98.3  F (36.8  C) 79 18 98 %         Physical Exam  Constitutional: Patient is well appearing. No distress. Head to toe trauma normal.  Head: Atraumatic. Silver dollar sized hematoma on the right frontal forehead above eyebrow. No ariane laceration of bleeding. Extra occular movements appear normal.   Mouth/Throat: Oropharynx is clear and moist.  No oropharyngeal exudate.  Eyes: Conjunctivae and EOM are normal. No scleral icterus.  Neck: Normal range of motion. Neck supple.   Cardiovascular: Normal rate, regular rhythm, normal heart sounds and intact distal pulses.   Pulmonary/Chest: Breath sounds normal. No respiratory distress.  Abdominal: Soft. Bowel sounds are normal. No distension. No tenderness. No rebound or guarding.   Musculoskeletal: Normal range of motion. No edema or tenderness.   Neurological: Alert and orientated to person, place, and time. No observable focal neuro deficit  Skin: Warm and dry. No rash noted. Not diaphoretic.     Emergency Department Course     Imaging:  Radiographic findings were communicated with the patient who voiced understanding of the findings.    CT Head without contrast:   Diffuse cerebral volume loss and cerebral white matter  changes consistent with chronic small vessel ischemic disease. No  evidence for acute intracranial pathology.     Radiation dose for this scan was reduced using automated exposure  control, adjustment of the mA and/or kV according to patient size, or  iterative reconstruction technique. as per radiology.    CT Cervical Spine without contrast:   Diffuse degenerative changes of the cervical spine. No  evidence for fracture or traumatic malalignment.      Radiation dose for this scan was reduced using automated exposure  control, adjustment of the mA and/or kV according to patient size, or  iterative reconstruction technique as per radiology.    CT facial bones without contrast:   There are no facial bone fractures. The paranasal sinuses  are well aerated. The orbits and globes bilaterally are unremarkable.  There are moderate degenerative changes of the TMJs bilaterally. as per radiology.    Interventions:  The patient's symptoms were improved with parenteral narcotics.  1438 Fentanyl 50 mcg IV    Emergency Department Course:  Nursing notes and vitals reviewed. (1429) I performed an exam of the  patient as documented above.     IV inserted. Medicine administered as documented above. Blood drawn. This was sent to the lab for further testing, results above.    The patient was sent for a head, face, and cervical spine CT while in the emergency department, findings above.     (1558) I rechecked the patient and discussed the results of her workup thus far.     Findings and plan explained to the Patient. Patient discharged home with instructions regarding supportive care, medications, and reasons to return. The importance of close follow-up was reviewed. The patient was prescribed Norco.    I personally reviewed the laboratory results with the Patient and answered all related questions prior to discharge.  Impression & Plan      Medical Decision Making:  The patient presents for evaluation of a head injury.  Due to the patient's anticoagulation, they were at risk of intracranial hemorrhage however clinically probably small considering only Plavix.  CT of the head was obtained and is negative for acute ICH or skull fracture.  The CT head result was discussed with the patient. The possibility of delayed onset bleeding was discussed with the patient and they be accompanied by a responsible person for the next 24 hours to observe for any neurologic changes.  Head injury precautions and reasons to return to the emergency department were discussed.     There was no evidence of deep structure injury, skull fracture or violation of the galea.  Pt was given wound care instructions and will follow up with PCP.   Based on a full exam, I did not have concern for any additional traumatic injuries.  The pt describes a mechanical accident and therefore no additional evaluation for metabolic or cardiac etiology was warranted at this time.  The patient was treated with fentanyl in the ED with good symptomatic relief.  The patient will follow up with her PCP within one week.      Diagnosis:    ICD-10-CM    1. Fall, initial  encounter W19.XXXA    2. Traumatic hematoma of forehead, initial encounter S00.83XA        Disposition:  discharged to home    Discharge Medications:  Discharge Medication List as of 8/7/2018  4:06 PM      START taking these medications    Details   HYDROcodone-acetaminophen (NORCO) 5-325 MG per tablet Take 1 tablet by mouth every 6 hours as needed for severe pain, Disp-8 tablet, R-0, Local Print           Scribe Disclosure:  I, Reymundo Herrera, am serving as a scribe on 8/7/2018 at 2:29 PM to personally document services performed by Nilesh Ignacio MD based on my observations and the provider's statements to me.       Reymundo Herrera  8/7/2018   [facility] EMERGENCY DEPARTMENT       Nilesh Ignacio MD  08/07/18 [id number]

## 2018-08-07 NOTE — DISCHARGE INSTRUCTIONS
"  Facial Contusion  A contusion is another word for a bruise. It happens when small blood vessels break open and leak blood into the nearby area. A facial contusion can result from a bump, hit, or fall. This may happen during sports or an accident. Symptoms of a contusion often include changes in skin color (bruising), swelling, and pain.   The swelling from the contusion should decrease in a few days. Bruising and pain may take several weeks to go away.   Home care    If you have been prescribed medicines for pain, take them as directed.    To help reduce swelling and pain, wrap a cold pack or bag of frozen peas in a thin towel. Put it on the injured area for up to 20 minutes. Do this a few times a day until the swelling goes down.     If you have scrapes or cuts on your face requiring stiches or other closures, care for them as directed.    For the next 24 hours (or longer if instructed):  ? Don t drink alcohol, or use sedatives or medicines that make you sleepy.  ? Don t drive or operate machinery.  ? Don't do anything strenuous. Don t lift or strain.  ? Don't return to sports or other activity that could result in another head injury.  Note about concussions  Because the injury was to your head, it is possible that a concussion (mild brain injury) could result. Symptoms of a concussion can show up later. Be alert for signs and symptoms of a concussion. Seek emergency medical care if any of these develop over the next hours to days:    Headache    Nausea or vomiting    Dizziness    Sensitivity to light or noise    Unusual sleepiness or grogginess    Trouble falling asleep    Personality changes    Vision changes    Memory loss    Confusion    Trouble walking or clumsiness    Loss of consciousness (even for a short time)    Inability to be awakened    Feeling \"off\" or slow as if in a daze   Follow-up care  Follow up with your healthcare provider, or as directed.  When to seek medical advice  Call your healthcare " provider right away if any of these occur:    Swelling or pain that gets worse, not better    New swelling or pain    Warmth or drainage from the swollen area or from cuts or scrapes    Fluid drainage or bleeding from the nose or ears    Fever of 100.4 F (38 C) or higher, or as directed by your healthcare provider  Call 911  Call 911 if any of the following occur:     Repeated vomiting    Unusual drowsiness or trouble awakening    Fainting or loss of consciousness    Seizure    Worsening confusion, memory loss, dizziness, headache, behavior, speech, or vision  Date Last Reviewed: 5/1/2017 2000-2017 [employer]. [address] [place] [address]. All rights reserved. This information is not intended as a substitute for professional medical care. Always follow your healthcare professional's instructions.

## 2018-08-07 NOTE — ED NOTES
Bed: ED20  Expected date: 8/7/18  Expected time: 2:17 PM  Means of arrival: Ambulance  Comments:  A595  85F Fall

## 2018-08-07 NOTE — ED AVS SNAPSHOT
[facility]    [address]    [facility] [address]    Phone:  [phone]    Fax:  [phone]                                       Tina Powell   MRN: [id number]    Department:  [facility]   Date of Visit:  8/7/2018           Patient Information     Date Of Birth          10/7/1932        Your diagnoses for this visit were:     Fall, initial encounter     Traumatic hematoma of forehead, initial encounter        You were seen by Nilesh Ignacio MD.      Follow-up Information     Follow up with Dian Tian MD. Schedule an appointment as soon as possible for a visit in 2 days.    Specialty:  Family Practice    Why:  As needed, If symptoms worsen    Contact information:    [address] [place] [address]  [phone]          Discharge Instructions         Facial Contusion  A contusion is another word for a bruise. It happens when small blood vessels break open and leak blood into the nearby area. A facial contusion can result from a bump, hit, or fall. This may happen during sports or an accident. Symptoms of a contusion often include changes in skin color (bruising), swelling, and pain.   The swelling from the contusion should decrease in a few days. Bruising and pain may take several weeks to go away.   Home care    If you have been prescribed medicines for pain, take them as directed.    To help reduce swelling and pain, wrap a cold pack or bag of frozen peas in a thin towel. Put it on the injured area for up to 20 minutes. Do this a few times a day until the swelling goes down.     If you have scrapes or cuts on your face requiring stiches or other closures, care for them as directed.    For the next 24 hours (or longer if instructed):  ? Don t drink alcohol, or use sedatives or medicines that make you sleepy.  ? Don t drive or operate machinery.  ? Don't do anything strenuous. Don t lift or  "strain.  ? Don't return to sports or other activity that could result in another head injury.  Note about concussions  Because the injury was to your head, it is possible that a concussion (mild brain injury) could result. Symptoms of a concussion can show up later. Be alert for signs and symptoms of a concussion. Seek emergency medical care if any of these develop over the next hours to days:    Headache    Nausea or vomiting    Dizziness    Sensitivity to light or noise    Unusual sleepiness or grogginess    Trouble falling asleep    Personality changes    Vision changes    Memory loss    Confusion    Trouble walking or clumsiness    Loss of consciousness (even for a short time)    Inability to be awakened    Feeling \"off\" or slow as if in a daze   Follow-up care  Follow up with your healthcare provider, or as directed.  When to seek medical advice  Call your healthcare provider right away if any of these occur:    Swelling or pain that gets worse, not better    New swelling or pain    Warmth or drainage from the swollen area or from cuts or scrapes    Fluid drainage or bleeding from the nose or ears    Fever of 100.4 F (38 C) or higher, or as directed by your healthcare provider  Call 911  Call 911 if any of the following occur:     Repeated vomiting    Unusual drowsiness or trouble awakening    Fainting or loss of consciousness    Seizure    Worsening confusion, memory loss, dizziness, headache, behavior, speech, or vision  Date Last Reviewed: 5/1/2017 2000-2017 [employer]. [address] [place] [address]. All rights reserved. This information is not intended as a substitute for professional medical care. Always follow your healthcare professional's instructions.          24 Hour Appointment Hotline       To make an appointment at any [place] clinic, call [phone] ([phone]). If you don't have a family doctor or clinic, we will help you find one. [facility] are " conveniently located to serve the needs of you and your family.             Review of your medicines      START taking        Dose / Directions Last dose taken    HYDROcodone-acetaminophen 5-325 MG per tablet   Commonly known as:  NORCO   Dose:  1 tablet   Quantity:  8 tablet        Take 1 tablet by mouth every 6 hours as needed for severe pain   Refills:  0          Our records show that you are taking the medicines listed below. If these are incorrect, please call your family doctor or clinic.        Dose / Directions Last dose taken    acetaminophen 325 MG tablet   Commonly known as:  TYLENOL   Dose:  325 mg   Quantity:  20 tablet        Take 1 tablet (325 mg) by mouth every 6 hours as needed for mild pain   Refills:  0        adalimumab 20 MG/0.4ML prefilled syringe kit   Commonly known as:  HUMIRA   Dose:  1 Syringe        Inject 1 Syringe Subcutaneous   Refills:  0        cholecalciferol 1000 UNIT tablet   Commonly known as:  vitamin D3   Quantity:  100 tablet        Take  3 tablets daily.   Refills:  3        clobetasol 0.05 % ointment   Commonly known as:  TEMOVATE   Dose:  1 Application        Apply 1 Application topically daily as needed Applies 2-3 times a week   Refills:  1        clopidogrel 75 MG tablet   Commonly known as:  PLAVIX   Dose:  75 mg   Quantity:  90 tablet        Take 1 tablet (75 mg) by mouth daily   Refills:  3        cyanocobalamin 1000 MCG/ML injection   Commonly known as:  VITAMIN B12   Dose:  1 mL        Inject 1 mL into the muscle every 30 days   Refills:  0        fenofibrate 48 MG tablet   Dose:  48 mg   Quantity:  90 tablet        Take 1 tablet (48 mg) by mouth daily   Refills:  0        * hydrochlorothiazide 12.5 MG capsule   Commonly known as:  MICROZIDE   Dose:  12.5 mg        Take 12.5 mg by mouth daily   Refills:  0        * hydrochlorothiazide 12.5 MG Tabs tablet   Dose:  12.5 mg   Quantity:  30 tablet        Take 1 tablet (12.5 mg) by mouth daily   Refills:  1         Magnesium 400 MG Caps   Dose:  1 tablet        Take 1 tablet by mouth daily   Refills:  0        nitroFURantoin (macrocrystal-monohydrate) 100 MG capsule   Commonly known as:  MACROBID   Dose:  100 mg   Quantity:  14 capsule        Take 1 capsule (100 mg) by mouth 2 times daily   Refills:  0        omega 3 1000 MG Caps   Quantity:  90 capsule        Take 3 grams daily   Refills:  0        omeprazole 20 MG CR capsule   Commonly known as:  priLOSEC   Dose:  20 mg   Quantity:  30 capsule        Take 1 capsule (20 mg) by mouth daily   Refills:  3        pyridoxine 50 MG Tabs   Commonly known as:  VITAMIN B-6   Dose:  100 mg        Take 100 mg by mouth daily   Refills:  0        QUEtiapine 25 MG tablet   Commonly known as:  SEROQUEL   Dose:  12.5 mg   Quantity:  30 tablet        Take 0.5 tablets (12.5 mg) by mouth 2 times daily as needed for agitation/disruptive behavior.   Refills:  0        * venlafaxine 75 MG 24 hr capsule   Commonly known as:  EFFEXOR-XR   Quantity:  30 capsule        TAKE 1 CAP BY MOUTH ONCE DAILY W/37.5VW=991.5MG PER DAY DX:DEPRESSION   Refills:  3        * venlafaxine 37.5 MG 24 hr capsule   Commonly known as:  EFFEXOR-XR   Quantity:  30 capsule        TAKE 1 CAP BY MOUTH ONCE DAILY W/75MG= 112.5MG PER DAY DX:DEPRESSION   Refills:  3        * Notice:  This list has 4 medication(s) that are the same as other medications prescribed for you. Read the directions carefully, and ask your doctor or other care provider to review them with you.            Information about OPIOIDS     PRESCRIPTION OPIOIDS: WHAT YOU NEED TO KNOW   We gave you an opioid (narcotic) pain medicine. It is important to manage your pain, but opioids are not always the best choice. You should first try all the other options your care team gave you. Take this medicine for as short a time (and as few doses) as possible.    Some activities can increase your pain, such as bandage changes or therapy sessions. It may help to take your  pain medicine 30 to 60 minutes before these activities. Reduce your stress by getting enough sleep, working on hobbies you enjoy and practicing relaxation or meditation. Talk to your care team about ways to manage your pain beyond prescription opioids.    These medicines have risks:    DO NOT drive when on new or higher doses of pain medicine. These medicines can affect your alertness and reaction times, and you could be arrested for driving under the influence (DUI). If you need to use opioids long-term, talk to your care team about driving.    DO NOT operate heavy machinery    DO NOT do any other dangerous activities while taking these medicines.    DO NOT drink any alcohol while taking these medicines.     If the opioid prescribed includes acetaminophen, DO NOT take with any other medicines that contain acetaminophen. Read all labels carefully. Look for the word  acetaminophen  or  Tylenol.  Ask your pharmacist if you have questions or are unsure.    You can get addicted to pain medicines, especially if you have a history of addiction (chemical, alcohol or substance dependence). Talk to your care team about ways to reduce this risk.    All opioids tend to cause constipation. Drink plenty of water and eat foods that have a lot of fiber, such as fruits, vegetables, prune juice, apple juice and high-fiber cereal. Take a laxative (Miralax, milk of magnesia, Colace, Senna) if you don t move your bowels at least every other day. Other side effects include upset stomach, sleepiness, dizziness, throwing up, tolerance (needing more of the medicine to have the same effect), physical dependence and slowed breathing.    Store your pills in a secure place, locked if possible. We will not replace any lost or stolen medicine. If you don t finish your medicine, please throw away (dispose) as directed by your pharmacist. The [place] [employer] has more information about safe disposal:  [email or web address]        Prescriptions were sent or printed at these locations (1 Prescription)                   Other Prescriptions                Printed at Department/Unit printer (1 of 1)         HYDROcodone-acetaminophen (NORCO) 5-325 MG per tablet                Procedures and tests performed during your visit     CT Facial Bones without Contrast    Cervical spine CT w/o contrast    Head CT w/o contrast      Orders Needing Specimen Collection     None      Pending Results     No orders found from 8/5/2018 to 8/8/2018.            Pending Culture Results     No orders found from 8/5/2018 to 8/8/2018.            Pending Results Instructions     If you had any lab results that were not finalized at the time of your Discharge, you can call the ED Lab Result RN at [phone]. You will be contacted by this team for any positive Lab results or changes in treatment. The nurses are available 7 days a week from 10A to 6:30P.  You can leave a message 24 hours per day and they will return your call.        Test Results From Your Hospital Stay        8/7/2018  3:57 PM      Narrative     CT OF THE FACE WITHOUT CONTRAST 8/7/2018 3:47 PM     COMPARISON: None    HISTORY: Fall     TECHNIQUE:  Helical thin-section axial CT images of the face were  acquired without contrast. Coronal reconstructions were created from  the axial source data.        Impression     IMPRESSION: There are no facial bone fractures. The paranasal sinuses  are well aerated. The orbits and globes bilaterally are unremarkable.  There are moderate degenerative changes of the TMJs bilaterally.      Radiation dose for this scan was reduced using automated exposure  control, adjustment of the mA and/or kV according to patient size, or  iterative reconstruction technique    MILA SWEET MD         8/7/2018  3:57 PM      Narrative     CT OF THE HEAD WITHOUT CONTRAST 8/7/2018 3:46 PM     COMPARISON: Head CT  6/8/2018    HISTORY: Fall.     TECHNIQUE: 5 mm thick axial CT images of the head were acquired  without IV contrast material.    FINDINGS:  There is moderate diffuse cerebral volume loss. There are  extensive confluent areas of decreased density in the cerebral white  matter bilaterally that are consistent with sequela of chronic small  vessel ischemic disease.     The ventricles and basal cisterns are within normal limits in  configuration given the degree of cerebral volume loss.  There is no  midline shift. There are no extra-axial fluid collections.     No intracranial hemorrhage, mass or recent infarct.    The visualized paranasal sinuses are well aerated. There is no  mastoiditis. There are no fractures of the visualized bones. There is  a moderate-sized right forehead scalp hematoma.        Impression     IMPRESSION:  Diffuse cerebral volume loss and cerebral white matter  changes consistent with chronic small vessel ischemic disease. No  evidence for acute intracranial pathology.     Radiation dose for this scan was reduced using automated exposure  control, adjustment of the mA and/or kV according to patient size, or  iterative reconstruction technique.    MILA SWEET MD         8/7/2018  3:57 PM      Narrative     CT OF THE CERVICAL SPINE WITHOUT CONTRAST   8/7/2018 3:45 PM     COMPARISON: None    HISTORY: Fall.      TECHNIQUE: Axial images of the cervical spine were acquired without  intravenous contrast. Multiplanar reformations were created.      FINDINGS: There is mild degenerative anterolisthesis of C4 upon C5 and  of C5 upon C6. Alignment of the cervical vertebrae is otherwise  normal. Vertebral body heights of the cervical spine are normal. There  is degenerative endplate spurring at the C2-C3 and C6-C7 levels. There  is severe facet arthropathy throughout the cervical spine. There is no  significant spinal canal narrowing at any level of the cervical spine.  There are no fractures of the  cervical spine.        Impression     IMPRESSION: Diffuse degenerative changes of the cervical spine. No  evidence for fracture or traumatic malalignment.      Radiation dose for this scan was reduced using automated exposure  control, adjustment of the mA and/or kV according to patient size, or  iterative reconstruction technique    MILA SWEET MD                Clinical Quality Measure: Blood Pressure Screening     Your blood pressure was checked while you were in the emergency department today. The last reading we obtained was  BP: (!) 178/102 . Please read the guidelines below about what these numbers mean and what you should do about them.  If your systolic blood pressure (the top number) is less than 120 and your diastolic blood pressure (the bottom number) is less than 80, then your blood pressure is normal. There is nothing more that you need to do about it.  If your systolic blood pressure (the top number) is 120-139 or your diastolic blood pressure (the bottom number) is 80-89, your blood pressure may be higher than it should be. You should have your blood pressure rechecked within a year by a primary care provider.  If your systolic blood pressure (the top number) is 140 or greater or your diastolic blood pressure (the bottom number) is 90 or greater, you may have high blood pressure. High blood pressure is treatable, but if left untreated over time it can put you at risk for heart attack, stroke, or kidney failure. You should have your blood pressure rechecked by a primary care provider within the next 4 weeks.  If your provider in the emergency department today gave you specific instructions to follow-up with your doctor or provider even sooner than that, you should follow that instruction and not wait for up to 4 weeks for your follow-up visit.        Thank you for choosing [facility]       Thank you for choosing [facility] for your care. Our goal is always to provide you with excellent care. Hearing  back from our patients is one way we can continue to improve our services. Please take a few minutes to complete the written survey that you may receive in the mail after you visit with us. Thank you!        Care EveryWhere ID     This is your Care EveryWhere ID. This could be used by other organizations to access your [place] medical records  [id number]        Equal Access to Services     JOSE CAMP : Mallika Evans, shi kingston, grace soares, cele marin . [facility] [phone].    ATENCIÓN: Si habla español, tiene a marquez disposición servicios gratuitos de asistencia lingüística. Morris al [phone].    We comply with applicable federal civil rights laws and [place] laws. We do not discriminate on the basis of race, color, national origin, age, disability, sex, sexual orientation, or gender identity.            After Visit Summary       This is your record. Keep this with you and show to your community pharmacist(s) and doctor(s) at your next visit.

## 2018-08-07 NOTE — ED TRIAGE NOTES
Presents to ER via EMS after a witnessed mechanical fall in which pt tripped while walking around her walker. Denies LOC, family at bedside corroborates this. Ambulatory after incident. Eyes PERRLA, denies neck and back pain, large hematoma on right eye. Currently taking plavix. AOx4, CMS intact.

## 2018-08-08 ENCOUNTER — TELEPHONE (OUTPATIENT)
Dept: FAMILY MEDICINE | Facility: CLINIC | Age: 83
End: 2018-08-08

## 2018-08-08 DIAGNOSIS — R46.89 BEHAVIORAL CHANGE: ICD-10-CM

## 2018-08-08 DIAGNOSIS — R45.1 AGITATION: ICD-10-CM

## 2018-08-08 RX ORDER — QUETIAPINE FUMARATE 25 MG/1
TABLET, FILM COATED ORAL
Qty: 30 TABLET | Refills: 0
Start: 2018-08-08

## 2018-08-08 NOTE — TELEPHONE ENCOUNTER
Daughter, Brittanie, calls reporting patient's adjustment to her new detention is not going well. Agitation and behavior problems (anger/swearing/belligerence/threatening) start escalating around 2pm and things are terrible until bedtime. Goes to bed okay.   States they do have quetiapine 12.5mg BID prn order but staff doesn't give it until symptoms present and by then she is so worked up they can't get her to take it. Brittanie asks if Dr. Tian might order something on scheduled basis.   Plan: per Dr. Tian - order faxed to [facility] office for quetiapine 12.5mg BID scheduled (noon and 8 pm) may have additional 12.5mg BID prn agitation. Brittanie satisfied with plan and will call us if not effective.   Leidy Sharma RN

## 2018-08-10 ENCOUNTER — RECORDS - HEALTHEAST (OUTPATIENT)
Dept: LAB | Facility: CLINIC | Age: 83
End: 2018-08-10

## 2018-08-10 LAB
ALBUMIN UR-MCNC: NEGATIVE MG/DL
APPEARANCE UR: CLEAR
BACTERIA #/AREA URNS HPF: ABNORMAL HPF
BILIRUB UR QL STRIP: NEGATIVE
COLOR UR AUTO: YELLOW
GLUCOSE UR STRIP-MCNC: NEGATIVE MG/DL
HGB UR QL STRIP: NEGATIVE
KETONES UR STRIP-MCNC: NEGATIVE MG/DL
LEUKOCYTE ESTERASE UR QL STRIP: ABNORMAL
MUCOUS THREADS #/AREA URNS LPF: ABNORMAL LPF
NITRATE UR QL: NEGATIVE
PH UR STRIP: 7 [PH] (ref 4.5–8)
RBC #/AREA URNS AUTO: ABNORMAL HPF
SP GR UR STRIP: 1.01 (ref 1–1.03)
SQUAMOUS #/AREA URNS AUTO: ABNORMAL LPF
TRANS CELLS #/AREA URNS HPF: ABNORMAL LPF
UROBILINOGEN UR STRIP-ACNC: ABNORMAL
WBC #/AREA URNS AUTO: ABNORMAL HPF

## 2018-08-11 LAB — BACTERIA SPEC CULT: NORMAL

## 2018-08-13 ENCOUNTER — RECORDS - HEALTHEAST (OUTPATIENT)
Dept: LAB | Facility: CLINIC | Age: 83
End: 2018-08-13

## 2018-08-13 LAB
ALBUMIN UR-MCNC: NEGATIVE MG/DL
APPEARANCE UR: CLEAR
BACTERIA #/AREA URNS HPF: ABNORMAL HPF
BILIRUB UR QL STRIP: NEGATIVE
COLOR UR AUTO: YELLOW
GLUCOSE UR STRIP-MCNC: NEGATIVE MG/DL
HGB UR QL STRIP: NEGATIVE
KETONES UR STRIP-MCNC: NEGATIVE MG/DL
LEUKOCYTE ESTERASE UR QL STRIP: ABNORMAL
MUCOUS THREADS #/AREA URNS LPF: ABNORMAL LPF
NITRATE UR QL: NEGATIVE
PH UR STRIP: 7 [PH] (ref 4.5–8)
RBC #/AREA URNS AUTO: ABNORMAL HPF
SP GR UR STRIP: 1.01 (ref 1–1.03)
SQUAMOUS #/AREA URNS AUTO: ABNORMAL LPF
UROBILINOGEN UR STRIP-ACNC: ABNORMAL
WBC #/AREA URNS AUTO: ABNORMAL HPF

## 2018-08-14 LAB — BACTERIA SPEC CULT: NO GROWTH

## 2018-08-15 ENCOUNTER — RECORDS - HEALTHEAST (OUTPATIENT)
Dept: LAB | Facility: CLINIC | Age: 83
End: 2018-08-15

## 2018-08-15 LAB
ALBUMIN SERPL-MCNC: 3.3 G/DL (ref 3.5–5)
ALP SERPL-CCNC: 52 U/L (ref 45–120)
ALT SERPL W P-5'-P-CCNC: 23 U/L (ref 0–45)
ANION GAP SERPL CALCULATED.3IONS-SCNC: 8 MMOL/L (ref 5–18)
AST SERPL W P-5'-P-CCNC: 29 U/L (ref 0–40)
BILIRUB SERPL-MCNC: 0.5 MG/DL (ref 0–1)
BUN SERPL-MCNC: 17 MG/DL (ref 8–28)
CALCIUM SERPL-MCNC: 9.3 MG/DL (ref 8.5–10.5)
CHLORIDE BLD-SCNC: 108 MMOL/L (ref 98–107)
CHOLEST SERPL-MCNC: 232 MG/DL
CO2 SERPL-SCNC: 26 MMOL/L (ref 22–31)
CREAT SERPL-MCNC: 0.88 MG/DL (ref 0.6–1.1)
ERYTHROCYTE [DISTWIDTH] IN BLOOD BY AUTOMATED COUNT: 14 % (ref 11–14.5)
FASTING STATUS PATIENT QL REPORTED: ABNORMAL
GFR SERPL CREATININE-BSD FRML MDRD: >60 ML/MIN/1.73M2
GLUCOSE BLD-MCNC: 84 MG/DL (ref 70–125)
HCT VFR BLD AUTO: 41.1 % (ref 35–47)
HDLC SERPL-MCNC: 30 MG/DL
HGB BLD-MCNC: 12.7 G/DL (ref 12–16)
LDLC SERPL CALC-MCNC: 163 MG/DL
MCH RBC QN AUTO: 25.8 PG (ref 27–34)
MCHC RBC AUTO-ENTMCNC: 30.9 G/DL (ref 32–36)
MCV RBC AUTO: 83 FL (ref 80–100)
PLATELET # BLD AUTO: 337 THOU/UL (ref 140–440)
PMV BLD AUTO: 10.8 FL (ref 8.5–12.5)
POTASSIUM BLD-SCNC: 3.8 MMOL/L (ref 3.5–5)
PROT SERPL-MCNC: 6.9 G/DL (ref 6–8)
RBC # BLD AUTO: 4.93 MILL/UL (ref 3.8–5.4)
SODIUM SERPL-SCNC: 142 MMOL/L (ref 136–145)
TRIGL SERPL-MCNC: 195 MG/DL
VIT B12 SERPL-MCNC: 721 PG/ML (ref 213–816)
WBC: 7.3 THOU/UL (ref 4–11)

## 2018-09-04 ENCOUNTER — MEDICAL CORRESPONDENCE (OUTPATIENT)
Dept: FAMILY MEDICINE | Facility: CLINIC | Age: 83
End: 2018-09-04

## 2018-09-07 ENCOUNTER — MEDICAL CORRESPONDENCE (OUTPATIENT)
Dept: FAMILY MEDICINE | Facility: CLINIC | Age: 83
End: 2018-09-07

## 2018-09-11 ENCOUNTER — TRANSFERRED RECORDS (OUTPATIENT)
Dept: FAMILY MEDICINE | Facility: CLINIC | Age: 83
End: 2018-09-11

## 2018-10-22 ENCOUNTER — RECORDS - HEALTHEAST (OUTPATIENT)
Dept: LAB | Facility: CLINIC | Age: 83
End: 2018-10-22

## 2018-10-22 LAB
ALBUMIN UR-MCNC: NEGATIVE MG/DL
APPEARANCE UR: ABNORMAL
BACTERIA #/AREA URNS HPF: ABNORMAL HPF
BILIRUB UR QL STRIP: NEGATIVE
CAOX CRY #/AREA URNS HPF: PRESENT /[HPF]
COLOR UR AUTO: YELLOW
GLUCOSE UR STRIP-MCNC: NEGATIVE MG/DL
HGB UR QL STRIP: NEGATIVE
KETONES UR STRIP-MCNC: NEGATIVE MG/DL
LEUKOCYTE ESTERASE UR QL STRIP: ABNORMAL
MUCOUS THREADS #/AREA URNS LPF: ABNORMAL LPF
NITRATE UR QL: NEGATIVE
PH UR STRIP: 7 [PH] (ref 4.5–8)
RBC #/AREA URNS AUTO: ABNORMAL HPF
SP GR UR STRIP: 1.02 (ref 1–1.03)
SQUAMOUS #/AREA URNS AUTO: ABNORMAL LPF
TRANS CELLS #/AREA URNS HPF: ABNORMAL LPF
UROBILINOGEN UR STRIP-ACNC: ABNORMAL
WBC #/AREA URNS AUTO: ABNORMAL HPF

## 2018-10-23 LAB — BACTERIA SPEC CULT: NORMAL

## 2018-11-04 ENCOUNTER — RECORDS - HEALTHEAST (OUTPATIENT)
Dept: LAB | Facility: CLINIC | Age: 83
End: 2018-11-04

## 2018-11-04 LAB
ALBUMIN UR-MCNC: NEGATIVE MG/DL
APPEARANCE UR: CLEAR
BACTERIA #/AREA URNS HPF: ABNORMAL HPF
BILIRUB UR QL STRIP: NEGATIVE
COLOR UR AUTO: ABNORMAL
GLUCOSE UR STRIP-MCNC: NEGATIVE MG/DL
HGB UR QL STRIP: NEGATIVE
KETONES UR STRIP-MCNC: NEGATIVE MG/DL
LEUKOCYTE ESTERASE UR QL STRIP: ABNORMAL
NITRATE UR QL: NEGATIVE
PH UR STRIP: 7.5 [PH] (ref 4.5–8)
RBC #/AREA URNS AUTO: ABNORMAL HPF
SP GR UR STRIP: 1.01 (ref 1–1.03)
SQUAMOUS #/AREA URNS AUTO: ABNORMAL LPF
UROBILINOGEN UR STRIP-ACNC: ABNORMAL
WBC #/AREA URNS AUTO: ABNORMAL HPF

## 2018-11-05 ENCOUNTER — RECORDS - HEALTHEAST (OUTPATIENT)
Dept: LAB | Facility: CLINIC | Age: 83
End: 2018-11-05

## 2018-11-05 LAB
ANION GAP SERPL CALCULATED.3IONS-SCNC: 10 MMOL/L (ref 5–18)
BACTERIA SPEC CULT: NORMAL
BUN SERPL-MCNC: 21 MG/DL (ref 8–28)
CALCIUM SERPL-MCNC: 9.1 MG/DL (ref 8.5–10.5)
CHLORIDE BLD-SCNC: 107 MMOL/L (ref 98–107)
CO2 SERPL-SCNC: 22 MMOL/L (ref 22–31)
CREAT SERPL-MCNC: 0.93 MG/DL (ref 0.6–1.1)
ERYTHROCYTE [DISTWIDTH] IN BLOOD BY AUTOMATED COUNT: 14.8 % (ref 11–14.5)
GFR SERPL CREATININE-BSD FRML MDRD: 57 ML/MIN/1.73M2
GLUCOSE BLD-MCNC: 104 MG/DL (ref 70–125)
HCT VFR BLD AUTO: 37.8 % (ref 35–47)
HGB BLD-MCNC: 11.4 G/DL (ref 12–16)
MCH RBC QN AUTO: 24.7 PG (ref 27–34)
MCHC RBC AUTO-ENTMCNC: 30.2 G/DL (ref 32–36)
MCV RBC AUTO: 82 FL (ref 80–100)
PLATELET # BLD AUTO: 373 THOU/UL (ref 140–440)
PMV BLD AUTO: 11 FL (ref 8.5–12.5)
POTASSIUM BLD-SCNC: 4.2 MMOL/L (ref 3.5–5)
RBC # BLD AUTO: 4.62 MILL/UL (ref 3.8–5.4)
SODIUM SERPL-SCNC: 139 MMOL/L (ref 136–145)
WBC: 9.8 THOU/UL (ref 4–11)

## 2019-02-15 ENCOUNTER — RECORDS - HEALTHEAST (OUTPATIENT)
Dept: LAB | Facility: CLINIC | Age: 84
End: 2019-02-15

## 2019-02-15 LAB
ALBUMIN UR-MCNC: ABNORMAL MG/DL
APPEARANCE UR: ABNORMAL
BACTERIA #/AREA URNS HPF: ABNORMAL HPF
BILIRUB UR QL STRIP: NEGATIVE
COLOR UR AUTO: YELLOW
GLUCOSE UR STRIP-MCNC: NEGATIVE MG/DL
HGB UR QL STRIP: NEGATIVE
KETONES UR STRIP-MCNC: NEGATIVE MG/DL
LEUKOCYTE ESTERASE UR QL STRIP: ABNORMAL
MUCOUS THREADS #/AREA URNS LPF: ABNORMAL LPF
NITRATE UR QL: POSITIVE
PH UR STRIP: 7 [PH] (ref 4.5–8)
RBC #/AREA URNS AUTO: ABNORMAL HPF
SP GR UR STRIP: 1.02 (ref 1–1.03)
SQUAMOUS #/AREA URNS AUTO: ABNORMAL LPF
TRANS CELLS #/AREA URNS HPF: ABNORMAL LPF
UROBILINOGEN UR STRIP-ACNC: ABNORMAL
WBC #/AREA URNS AUTO: ABNORMAL HPF

## 2019-02-17 LAB — BACTERIA SPEC CULT: ABNORMAL

## 2019-04-16 ENCOUNTER — RECORDS - HEALTHEAST (OUTPATIENT)
Dept: LAB | Facility: CLINIC | Age: 84
End: 2019-04-16

## 2019-04-16 LAB
ALBUMIN UR-MCNC: NEGATIVE MG/DL
APPEARANCE UR: CLEAR
BILIRUB UR QL STRIP: NEGATIVE
COLOR UR AUTO: NORMAL
GLUCOSE UR STRIP-MCNC: NEGATIVE MG/DL
HGB UR QL STRIP: NEGATIVE
KETONES UR STRIP-MCNC: NEGATIVE MG/DL
LEUKOCYTE ESTERASE UR QL STRIP: NEGATIVE
NITRATE UR QL: NEGATIVE
PH UR STRIP: 7.5 [PH] (ref 4.5–8)
SP GR UR STRIP: 1.01 (ref 1–1.03)
UROBILINOGEN UR STRIP-ACNC: NORMAL

## 2019-04-17 LAB — BACTERIA SPEC CULT: NO GROWTH

## 2019-05-07 ENCOUNTER — RECORDS - HEALTHEAST (OUTPATIENT)
Dept: LAB | Facility: CLINIC | Age: 84
End: 2019-05-07

## 2019-05-08 LAB
ALBUMIN SERPL-MCNC: 3.2 G/DL (ref 3.5–5)
ALP SERPL-CCNC: 45 U/L (ref 45–120)
ALT SERPL W P-5'-P-CCNC: 23 U/L (ref 0–45)
ANION GAP SERPL CALCULATED.3IONS-SCNC: 9 MMOL/L (ref 5–18)
AST SERPL W P-5'-P-CCNC: 29 U/L (ref 0–40)
BILIRUB SERPL-MCNC: 0.2 MG/DL (ref 0–1)
BUN SERPL-MCNC: 21 MG/DL (ref 8–28)
CALCIUM SERPL-MCNC: 9.4 MG/DL (ref 8.5–10.5)
CHLORIDE BLD-SCNC: 107 MMOL/L (ref 98–107)
CO2 SERPL-SCNC: 27 MMOL/L (ref 22–31)
CREAT SERPL-MCNC: 1.07 MG/DL (ref 0.6–1.1)
ERYTHROCYTE [DISTWIDTH] IN BLOOD BY AUTOMATED COUNT: 17.7 % (ref 11–14.5)
GFR SERPL CREATININE-BSD FRML MDRD: 49 ML/MIN/1.73M2
GLUCOSE BLD-MCNC: 100 MG/DL (ref 70–125)
HCT VFR BLD AUTO: 40.2 % (ref 35–47)
HGB BLD-MCNC: 11.6 G/DL (ref 12–16)
MCH RBC QN AUTO: 22.7 PG (ref 27–34)
MCHC RBC AUTO-ENTMCNC: 28.9 G/DL (ref 32–36)
MCV RBC AUTO: 79 FL (ref 80–100)
PLATELET # BLD AUTO: 353 THOU/UL (ref 140–440)
PMV BLD AUTO: 11.2 FL (ref 8.5–12.5)
POTASSIUM BLD-SCNC: 3.8 MMOL/L (ref 3.5–5)
PROT SERPL-MCNC: 7.1 G/DL (ref 6–8)
RBC # BLD AUTO: 5.12 MILL/UL (ref 3.8–5.4)
SODIUM SERPL-SCNC: 143 MMOL/L (ref 136–145)
WBC: 6.6 THOU/UL (ref 4–11)

## 2019-06-06 ENCOUNTER — RECORDS - HEALTHEAST (OUTPATIENT)
Dept: LAB | Facility: CLINIC | Age: 84
End: 2019-06-06

## 2019-06-07 LAB
ALBUMIN SERPL-MCNC: 3 G/DL (ref 3.5–5)
ALP SERPL-CCNC: 44 U/L (ref 45–120)
ALT SERPL W P-5'-P-CCNC: 18 U/L (ref 0–45)
ANION GAP SERPL CALCULATED.3IONS-SCNC: 10 MMOL/L (ref 5–18)
AST SERPL W P-5'-P-CCNC: 24 U/L (ref 0–40)
BILIRUB SERPL-MCNC: 0.3 MG/DL (ref 0–1)
BUN SERPL-MCNC: 24 MG/DL (ref 8–28)
CALCIUM SERPL-MCNC: 9.3 MG/DL (ref 8.5–10.5)
CHLORIDE BLD-SCNC: 105 MMOL/L (ref 98–107)
CHOLEST SERPL-MCNC: 201 MG/DL
CO2 SERPL-SCNC: 29 MMOL/L (ref 22–31)
CREAT SERPL-MCNC: 1.08 MG/DL (ref 0.6–1.1)
ERYTHROCYTE [DISTWIDTH] IN BLOOD BY AUTOMATED COUNT: 18.2 % (ref 11–14.5)
FASTING STATUS PATIENT QL REPORTED: ABNORMAL
GFR SERPL CREATININE-BSD FRML MDRD: 48 ML/MIN/1.73M2
GLUCOSE BLD-MCNC: 85 MG/DL (ref 70–125)
HCT VFR BLD AUTO: 39.8 % (ref 35–47)
HDLC SERPL-MCNC: 33 MG/DL
HGB BLD-MCNC: 11.4 G/DL (ref 12–16)
LDLC SERPL CALC-MCNC: 123 MG/DL
MCH RBC QN AUTO: 22.6 PG (ref 27–34)
MCHC RBC AUTO-ENTMCNC: 28.6 G/DL (ref 32–36)
MCV RBC AUTO: 79 FL (ref 80–100)
PLATELET # BLD AUTO: 332 THOU/UL (ref 140–440)
PMV BLD AUTO: 11 FL (ref 8.5–12.5)
POTASSIUM BLD-SCNC: 3.9 MMOL/L (ref 3.5–5)
PROT SERPL-MCNC: 6.8 G/DL (ref 6–8)
RBC # BLD AUTO: 5.05 MILL/UL (ref 3.8–5.4)
SODIUM SERPL-SCNC: 144 MMOL/L (ref 136–145)
TRIGL SERPL-MCNC: 225 MG/DL
WBC: 6.5 THOU/UL (ref 4–11)

## 2019-06-10 LAB — 25(OH)D3 SERPL-MCNC: 29.1 NG/ML (ref 30–80)

## 2019-07-01 ENCOUNTER — RECORDS - HEALTHEAST (OUTPATIENT)
Dept: LAB | Facility: CLINIC | Age: 84
End: 2019-07-01

## 2019-07-02 LAB — BACTERIA SPEC CULT: NO GROWTH

## 2019-09-13 ENCOUNTER — RECORDS - HEALTHEAST (OUTPATIENT)
Dept: LAB | Facility: CLINIC | Age: 84
End: 2019-09-13

## 2019-09-13 LAB
ERYTHROCYTE [DISTWIDTH] IN BLOOD BY AUTOMATED COUNT: 18.4 % (ref 11–14.5)
HCT VFR BLD AUTO: 37.5 % (ref 35–47)
HGB BLD-MCNC: 11 G/DL (ref 12–16)
MCH RBC QN AUTO: 23.1 PG (ref 27–34)
MCHC RBC AUTO-ENTMCNC: 29.3 G/DL (ref 32–36)
MCV RBC AUTO: 79 FL (ref 80–100)
PLATELET # BLD AUTO: 343 THOU/UL (ref 140–440)
PMV BLD AUTO: 11 FL (ref 8.5–12.5)
RBC # BLD AUTO: 4.76 MILL/UL (ref 3.8–5.4)
VIT B12 SERPL-MCNC: 576 PG/ML (ref 213–816)
WBC: 6.4 THOU/UL (ref 4–11)

## 2019-10-25 ENCOUNTER — RECORDS - HEALTHEAST (OUTPATIENT)
Dept: LAB | Facility: CLINIC | Age: 84
End: 2019-10-25

## 2019-10-25 LAB
ALBUMIN UR-MCNC: NEGATIVE MG/DL
APPEARANCE UR: CLEAR
BACTERIA #/AREA URNS HPF: ABNORMAL HPF
BILIRUB UR QL STRIP: NEGATIVE
COLOR UR AUTO: YELLOW
GLUCOSE UR STRIP-MCNC: NEGATIVE MG/DL
HGB UR QL STRIP: NEGATIVE
KETONES UR STRIP-MCNC: NEGATIVE MG/DL
LEUKOCYTE ESTERASE UR QL STRIP: ABNORMAL
MUCOUS THREADS #/AREA URNS LPF: ABNORMAL LPF
NITRATE UR QL: NEGATIVE
PH UR STRIP: 6 [PH] (ref 4.5–8)
RBC #/AREA URNS AUTO: ABNORMAL HPF
SP GR UR STRIP: 1.02 (ref 1–1.03)
SQUAMOUS #/AREA URNS AUTO: ABNORMAL LPF
UROBILINOGEN UR STRIP-ACNC: ABNORMAL
WBC #/AREA URNS AUTO: ABNORMAL HPF

## 2019-10-28 LAB — BACTERIA SPEC CULT: ABNORMAL

## 2019-12-11 ENCOUNTER — RECORDS - HEALTHEAST (OUTPATIENT)
Dept: LAB | Facility: CLINIC | Age: 84
End: 2019-12-11

## 2019-12-11 LAB
ANION GAP SERPL CALCULATED.3IONS-SCNC: 12 MMOL/L (ref 5–18)
BUN SERPL-MCNC: 19 MG/DL (ref 8–28)
CALCIUM SERPL-MCNC: 9.1 MG/DL (ref 8.5–10.5)
CHLORIDE BLD-SCNC: 106 MMOL/L (ref 98–107)
CO2 SERPL-SCNC: 25 MMOL/L (ref 22–31)
CREAT SERPL-MCNC: 1.18 MG/DL (ref 0.6–1.1)
ERYTHROCYTE [DISTWIDTH] IN BLOOD BY AUTOMATED COUNT: 17.1 % (ref 11–14.5)
GFR SERPL CREATININE-BSD FRML MDRD: 43 ML/MIN/1.73M2
GLUCOSE BLD-MCNC: 105 MG/DL (ref 70–125)
HCT VFR BLD AUTO: 40.5 % (ref 35–47)
HGB BLD-MCNC: 11.5 G/DL (ref 12–16)
MCH RBC QN AUTO: 22.8 PG (ref 27–34)
MCHC RBC AUTO-ENTMCNC: 28.4 G/DL (ref 32–36)
MCV RBC AUTO: 80 FL (ref 80–100)
PLATELET # BLD AUTO: 401 THOU/UL (ref 140–440)
PMV BLD AUTO: 11.1 FL (ref 8.5–12.5)
POTASSIUM BLD-SCNC: 4.3 MMOL/L (ref 3.5–5)
RBC # BLD AUTO: 5.05 MILL/UL (ref 3.8–5.4)
SODIUM SERPL-SCNC: 143 MMOL/L (ref 136–145)
WBC: 8.8 THOU/UL (ref 4–11)

## 2019-12-22 ENCOUNTER — APPOINTMENT (OUTPATIENT)
Dept: CT IMAGING | Facility: CLINIC | Age: 84
End: 2019-12-22
Attending: EMERGENCY MEDICINE
Payer: COMMERCIAL

## 2019-12-22 ENCOUNTER — HOSPITAL ENCOUNTER (EMERGENCY)
Facility: CLINIC | Age: 84
Discharge: HOME | End: 2019-12-22
Attending: EMERGENCY MEDICINE | Admitting: EMERGENCY MEDICINE
Payer: COMMERCIAL

## 2019-12-22 VITALS
DIASTOLIC BLOOD PRESSURE: 86 MMHG | RESPIRATION RATE: 20 BRPM | SYSTOLIC BLOOD PRESSURE: 156 MMHG | HEART RATE: 82 BPM | OXYGEN SATURATION: 91 % | TEMPERATURE: 97.7 F

## 2019-12-22 DIAGNOSIS — N39.0 URINARY TRACT INFECTION WITHOUT HEMATURIA, SITE UNSPECIFIED: ICD-10-CM
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
## 2019-12-22 DIAGNOSIS — S02.2XXA CLOSED FRACTURE OF NASAL BONE, INITIAL ENCOUNTER: ICD-10-CM

## 2019-12-22 LAB
ALBUMIN UR-MCNC: 10 MG/DL
ANION GAP SERPL CALCULATED.3IONS-SCNC: 7 MMOL/L (ref 3–14)
APPEARANCE UR: ABNORMAL
BACTERIA #/AREA URNS HPF: ABNORMAL /HPF
BILIRUB UR QL STRIP: NEGATIVE
BUN SERPL-MCNC: 25 MG/DL (ref 7–30)
CALCIUM SERPL-MCNC: 8.9 MG/DL (ref 8.5–10.1)
CHLORIDE SERPL-SCNC: 110 MMOL/L (ref 94–109)
CO2 SERPL-SCNC: 27 MMOL/L (ref 20–32)
COLOR UR AUTO: YELLOW
CREAT SERPL-MCNC: 1.03 MG/DL (ref 0.52–1.04)
GFR SERPL CREATININE-BSD FRML MDRD: 49 ML/MIN/{1.73_M2}
GLUCOSE SERPL-MCNC: 153 MG/DL (ref 70–99)
GLUCOSE UR STRIP-MCNC: NEGATIVE MG/DL
HGB UR QL STRIP: NEGATIVE
KETONES UR STRIP-MCNC: NEGATIVE MG/DL
LEUKOCYTE ESTERASE UR QL STRIP: ABNORMAL
MUCOUS THREADS #/AREA URNS LPF: PRESENT /LPF
NITRATE UR QL: POSITIVE
PH UR STRIP: 7.5 PH (ref 5–7)
POTASSIUM SERPL-SCNC: 3.7 MMOL/L (ref 3.4–5.3)
RBC #/AREA URNS AUTO: 2 /HPF (ref 0–2)
SODIUM SERPL-SCNC: 144 MMOL/L (ref 133–144)
SOURCE: ABNORMAL
SP GR UR STRIP: 1.02 (ref 1–1.03)
SQUAMOUS #/AREA URNS AUTO: 1 /HPF (ref 0–1)
UROBILINOGEN UR STRIP-MCNC: NORMAL MG/DL (ref 0–2)
WBC #/AREA URNS AUTO: 19 /HPF (ref 0–5)

## 2019-12-22 PROCEDURE — 90471 IMMUNIZATION ADMIN: CPT

## 2019-12-22 PROCEDURE — 80048 BASIC METABOLIC PNL TOTAL CA: CPT | Performed by: EMERGENCY MEDICINE

## 2019-12-22 PROCEDURE — 36415 COLL VENOUS BLD VENIPUNCTURE: CPT | Performed by: EMERGENCY MEDICINE

## 2019-12-22 PROCEDURE — 72125 CT NECK SPINE W/O DYE: CPT

## 2019-12-22 PROCEDURE — 25000128 H RX IP 250 OP 636: Performed by: EMERGENCY MEDICINE

## 2019-12-22 PROCEDURE — 99285 EMERGENCY DEPT VISIT HI MDM: CPT | Mod: 25

## 2019-12-22 PROCEDURE — 70486 CT MAXILLOFACIAL W/O DYE: CPT

## 2019-12-22 PROCEDURE — 81001 URINALYSIS AUTO W/SCOPE: CPT | Performed by: EMERGENCY MEDICINE

## 2019-12-22 PROCEDURE — 85025 COMPLETE CBC W/AUTO DIFF WBC: CPT | Performed by: EMERGENCY MEDICINE

## 2019-12-22 PROCEDURE — 70450 CT HEAD/BRAIN W/O DYE: CPT

## 2019-12-22 PROCEDURE — 12011 RPR F/E/E/N/L/M 2.5 CM/<: CPT

## 2019-12-22 PROCEDURE — 90714 TD VACC NO PRESV 7 YRS+ IM: CPT | Performed by: EMERGENCY MEDICINE

## 2019-12-22 RX ORDER — METHYLCELLULOSE 4000CPS 30 %
POWDER (GRAM) MISCELLANEOUS
Status: DISCONTINUED
Start: 2019-12-22 | End: 2019-12-22 | Stop reason: HOSPADM

## 2019-12-22 RX ORDER — CEPHALEXIN 500 MG/1
500 CAPSULE ORAL 2 TIMES DAILY
Qty: 10 CAPSULE | Refills: 0 | Status: SHIPPED | OUTPATIENT
Start: 2019-12-22 | End: 2019-12-27

## 2019-12-22 RX ADMIN — CLOSTRIDIUM TETANI TOXOID ANTIGEN (FORMALDEHYDE INACTIVATED) AND CORYNEBACTERIUM DIPHTHERIAE TOXOID ANTIGEN (FORMALDEHYDE INACTIVATED) 0.5 ML: 5; 2 INJECTION, SUSPENSION INTRAMUSCULAR at 10:15

## 2019-12-22 NOTE — ED NOTES
Bed: ED16  Expected date: 12/22/19  Expected time: 6:23 AM  Means of arrival:   Comments:  Jesus 593: 87 F - Fall

## 2019-12-22 NOTE — DISCHARGE INSTRUCTIONS
Please look for signs of septal hematoma in nose daily.  If see septal hematoma please follow-up with ENT or return to the ER.

## 2019-12-22 NOTE — ED AVS SNAPSHOT
[facility]  [address]  [facility] [address]  Phone:  [phone]  Fax:  [phone]                                    Tina Powell   MRN: [id number]    Department:  [facility]   Date of Visit:  12/22/2019           After Visit Summary Signature Page    I have received my discharge instructions, and my questions have been answered. I have discussed any challenges I see with this plan with the nurse or doctor.    ..........................................................................................................................................  Patient/Patient Representative Signature      ..........................................................................................................................................  Patient Representative Print Name and Relationship to Patient    ..................................................               ................................................  Date                                   Time    ..........................................................................................................................................  Reviewed by Signature/Title    ...................................................              ..............................................  Date                                               Time          22EPIC Rev 08/18

## 2019-12-22 NOTE — ED TRIAGE NOTES
EMS states pt had mechanical GLF this morning while getting up to the bathroom. Pt lives at [facility] in [place]. Pt currently on Plavix per EMS. ABCs intact Baseline Mental Status

## 2019-12-22 NOTE — ED PROVIDER NOTES
History     Chief Complaint:  Fall      HPI   Tina Powell is a 87 year old female, with a history of emphysema and is on Plavix, who presents with fall. The patient's daughter notes that she had a mechanical fall while getting up and going to the bathroom and fell on her nose and face. The patient lives at a memory care facility in [place] and arrived via EMS. The daughter denies a nose bleed. Daughter is concerned that she could have a uti. Patient is quite anxious and a limited historian secondary to dementia.       Allergies:  Sulfa drugs  Hmg-Coa-R inhibitors     Medications:    Humira  Plavix  Hydrochlorothiazide  Effexor XR  Seroquel  Omeprazole  Macrobid    Past Medical History:    Depression  Dyslipidemia  Hypertension  Hyperlipidemia  Psoriases  Urosepsis  CVA  Cerebral infarction  Alcoholism   Encephalopathy    Past Surgical History:    Hysterectomy    Family History:    Heart disease, mother  Cerebrovascular disease, father    Social History:  Smoking status: Former smoker  Alcohol use: Yes  Drug use: No  PCP: Tyler Garcia  Presents to the ED with daughter   Marital Status:   [4]     Review of Systems   HENT:        Nose pain   Respiratory: Negative for shortness of breath.    Psychiatric/Behavioral: Positive for confusion. The patient is nervous/anxious.    All other systems reviewed and are negative.        Physical Exam     Patient Vitals for the past 24 hrs:   BP Temp Temp src Pulse Heart Rate Resp SpO2   12/22/19 1000 (!) 156/86 -- -- 82 -- -- 91 %   12/22/19 0715 (!) 145/88 -- -- 81 -- -- 93 %   12/22/19 0700 (!) 146/78 -- -- 83 -- -- 90 %   12/22/19 0645 (!) 151/82 -- -- 83 -- -- 91 %   12/22/19 0638 (!) 153/90 97.7  F (36.5  C) Oral -- 84 20 91 %       Physical Exam  General: Patient is alert and interactive when I enter the room  Head:  The scalp, face, and head appear normal  Eyes:  Conjunctivae are normal  ENT:    The nose is normal    Pinnae are normal    External  acoustic canals are normal    Deformity to nasal septum with ecchymosis and swelling to nasal bridge    1 cm vertical laceration to nasal bridge     No septal hematoma   Neck:  Trachea midline, no midline cervical tenderness  CV:  Pulses are normal, RRR  Resp:  No respiratory distress, CTAB   Abdomen:      Soft, non-tender, non-distended  Musc:  Normal muscular tone    No major joint effusions    No asymmetric leg swelling  Skin:  No rash or lesions noted  Neuro:  Speech is normal and fluent. Face is symmetric.     Moving all extremities well.   Psych: Awake. Alert.  Normal affect.  Appropriate interactions.    Emergency Department Course     Imaging:  Radiology findings were communicated with the patient who voiced understanding of the findings.    CT Head w/o contrast:  No acute intracranial abnormality.    Reading per radiology     CT Facial bones w/o contrast:  Acute fractures of the nasal bone and nasal septum.    Reading per radiology     CT Cervical spine w/o contrast:  No evidence of acute fracture or subluxation in the  cervical spine.    Reading per radiology     Laboratory:  Laboratory findings were communicated with the patient who voiced understanding of the findings.    CBC: WBC 12.4(H), HGB 11.6(L),   BMP: Chloride 110(H), Glucose 153(H), GFR 49(L) o/w WNL (Creatinine 1.03)  UA with micro: pH 7.5(H), protein albumin 10, positive nitrite, moderate leukocyte esterase, WBC/HPF 19(H), moderate bacteria, mucous present     Procedures    Laceration Repair        LACERATION:  A simple clean 1 cm laceration.      LOCATION:  Bridge of nose      FUNCTION:  Distally sensation and circulation are intact.      ANESTHESIA:  LET - Topical      PREPARATION:  Irrigation with Normal Saline and Shur Clens      DEBRIDEMENT:  no debridement      CLOSURE:  Wound was closed with One Layer.  Skin closed with 3 x 6.0 Nylon using interrupted sutures.      Interventions:  1015 Tdap, 0.5 mL IM    Emergency Department  Course:   Nursing notes and vitals reviewed.    0650 I performed an exam of the patient as documented above.     0657 IV was inserted and blood was drawn for laboratory testing, results above.     0723 The patient was sent for a CT Head, Face, and Cervical spine while in the emergency department, results above.      0900 I performed a laceration repair, see note above.     0950 I personally reviewed the results with the patient and answered all related questions prior to discharge.    Impression & Plan      Medical Decision Making:  Tina Powell is a 87 year old female who presents for evaluation of a fall.  This was clearly a mechanical fall. However, daughter reports some mild increase in confusion so question if she has a UTI. Detailed exam shows fracture of the nasal bone and laceration to nasal bridge.  See laceration note above. Results as above. CT head showed no hemorrhage but nasal fractures. CT cervical spine. BMP and CBC unremarkable but does have a UTI. Patient's daughter felt she is at her baseline and feels she is ready to go home. Based on this I would send home for outpatient management and removal of stiches. Will start on abx. With her septal fracture and being on anticoagulation discussed careful observation for septal hematoma.         Diagnosis:    ICD-10-CM    1. Fall, initial encounter W19.XXXA    2. Closed fracture of nasal bone, initial encounter S02.2XXA    3. Urinary tract infection without hematuria, site unspecified N39.0        Disposition:   The patient is discharged to home.     Discharge Medications:  New Prescriptions    CEPHALEXIN (KEFLEX) 500 MG CAPSULE    Take 1 capsule (500 mg) by mouth 2 times daily for 5 days       Scribe Disclosure:  I, Alyssa Toledo, am serving as a scribe at 0650 on 12/22/2019 to document services personally performed by Breanna Brown MD based on my observations and the provider's statements to me.   [facility] EMERGENCY  DEPARTMENT       Breanna Brown MD  12/28/19 3197

## 2019-12-22 NOTE — ED NOTES
I spoke with Haile SCHMITZ at [facility] and he is aware patient is coming back with her daughter. Patient escorted by wheelchair to her daughters car. Suture removal kit sent with daughter for Haile to remove sutures in 5 days, order written on chart.

## 2019-12-23 LAB
ANISOCYTOSIS BLD QL SMEAR: SLIGHT
BASOPHILS # BLD AUTO: 0.1 10E9/L (ref 0–0.2)
BASOPHILS NFR BLD AUTO: 0.4 %
DIFFERENTIAL METHOD BLD: ABNORMAL
EOSINOPHIL # BLD AUTO: 0.3 10E9/L (ref 0–0.7)
EOSINOPHIL NFR BLD AUTO: 2.2 %
ERYTHROCYTE [DISTWIDTH] IN BLOOD BY AUTOMATED COUNT: 16.9 % (ref 10–15)
HCT VFR BLD AUTO: 40.1 % (ref 35–47)
HGB BLD-MCNC: 11.6 G/DL (ref 11.7–15.7)
IMM GRANULOCYTES # BLD: 0.1 10E9/L (ref 0–0.4)
IMM GRANULOCYTES NFR BLD: 0.8 %
LYMPHOCYTES # BLD AUTO: 5.4 10E9/L (ref 0.8–5.3)
LYMPHOCYTES NFR BLD AUTO: 43.9 %
MCH RBC QN AUTO: 22.7 PG (ref 26.5–33)
MCHC RBC AUTO-ENTMCNC: 28.9 G/DL (ref 31.5–36.5)
MCV RBC AUTO: 79 FL (ref 78–100)
MONOCYTES # BLD AUTO: 0.9 10E9/L (ref 0–1.3)
MONOCYTES NFR BLD AUTO: 7.6 %
NEUTROPHILS # BLD AUTO: 5.6 10E9/L (ref 1.6–8.3)
NEUTROPHILS NFR BLD AUTO: 45.1 %
NRBC # BLD AUTO: 0 10*3/UL
NRBC BLD AUTO-RTO: 0 /100
PLATELET # BLD AUTO: 404 10E9/L (ref 150–450)
PLATELET # BLD EST: ABNORMAL 10*3/UL
RBC # BLD AUTO: 5.11 10E12/L (ref 3.8–5.2)
VARIANT LYMPHS BLD QL SMEAR: PRESENT
WBC # BLD AUTO: 12.4 10E9/L (ref 4–11)

## 2019-12-28 ASSESSMENT — ENCOUNTER SYMPTOMS
SHORTNESS OF BREATH: 0
CONFUSION: 1
NERVOUS/ANXIOUS: 1

## 2020-02-05 ENCOUNTER — RECORDS - HEALTHEAST (OUTPATIENT)
Dept: LAB | Facility: CLINIC | Age: 85
End: 2020-02-05

## 2020-02-05 LAB
ALBUMIN UR-MCNC: NEGATIVE MG/DL
APPEARANCE UR: CLEAR
BACTERIA #/AREA URNS HPF: ABNORMAL HPF
BILIRUB UR QL STRIP: NEGATIVE
COLOR UR AUTO: YELLOW
GLUCOSE UR STRIP-MCNC: NEGATIVE MG/DL
HGB UR QL STRIP: NEGATIVE
HYALINE CASTS #/AREA URNS LPF: ABNORMAL LPF
KETONES UR STRIP-MCNC: NEGATIVE MG/DL
LEUKOCYTE ESTERASE UR QL STRIP: ABNORMAL
NITRATE UR QL: NEGATIVE
PH UR STRIP: 7 [PH] (ref 4.5–8)
RBC #/AREA URNS AUTO: ABNORMAL HPF
SP GR UR STRIP: 1.01 (ref 1–1.03)
SQUAMOUS #/AREA URNS AUTO: ABNORMAL LPF
TRANS CELLS #/AREA URNS HPF: ABNORMAL LPF
UROBILINOGEN UR STRIP-ACNC: ABNORMAL
WBC #/AREA URNS AUTO: ABNORMAL HPF
WBC CLUMPS #/AREA URNS HPF: PRESENT /[HPF]

## 2020-02-07 LAB — BACTERIA SPEC CULT: ABNORMAL

## 2020-03-03 ENCOUNTER — RECORDS - HEALTHEAST (OUTPATIENT)
Dept: LAB | Facility: CLINIC | Age: 85
End: 2020-03-03

## 2020-03-03 LAB
ALBUMIN UR-MCNC: ABNORMAL MG/DL
APPEARANCE UR: ABNORMAL
BACTERIA #/AREA URNS HPF: ABNORMAL HPF
BILIRUB UR QL STRIP: NEGATIVE
COLOR UR AUTO: YELLOW
GLUCOSE UR STRIP-MCNC: NEGATIVE MG/DL
HGB UR QL STRIP: NEGATIVE
KETONES UR STRIP-MCNC: NEGATIVE MG/DL
LEUKOCYTE ESTERASE UR QL STRIP: ABNORMAL
MUCOUS THREADS #/AREA URNS LPF: ABNORMAL LPF
NITRATE UR QL: POSITIVE
PH UR STRIP: 7.5 [PH] (ref 4.5–8)
RBC #/AREA URNS AUTO: ABNORMAL HPF
SP GR UR STRIP: 1.02 (ref 1–1.03)
SQUAMOUS #/AREA URNS AUTO: ABNORMAL LPF
TRANS CELLS #/AREA URNS HPF: ABNORMAL LPF
UROBILINOGEN UR STRIP-ACNC: ABNORMAL
WBC #/AREA URNS AUTO: >100 HPF
WBC CLUMPS #/AREA URNS HPF: PRESENT /[HPF]

## 2020-03-05 LAB — BACTERIA SPEC CULT: ABNORMAL

## 2020-03-17 ENCOUNTER — RECORDS - HEALTHEAST (OUTPATIENT)
Dept: LAB | Facility: CLINIC | Age: 85
End: 2020-03-17

## 2020-03-17 LAB
ALBUMIN UR-MCNC: NEGATIVE MG/DL
APPEARANCE UR: ABNORMAL
BACTERIA #/AREA URNS HPF: ABNORMAL HPF
BILIRUB UR QL STRIP: NEGATIVE
COLOR UR AUTO: YELLOW
GLUCOSE UR STRIP-MCNC: NEGATIVE MG/DL
HGB UR QL STRIP: NEGATIVE
KETONES UR STRIP-MCNC: NEGATIVE MG/DL
LEUKOCYTE ESTERASE UR QL STRIP: ABNORMAL
MUCOUS THREADS #/AREA URNS LPF: ABNORMAL LPF
NITRATE UR QL: NEGATIVE
PH UR STRIP: 6.5 [PH] (ref 4.5–8)
RBC #/AREA URNS AUTO: ABNORMAL HPF
SP GR UR STRIP: 1.02 (ref 1–1.03)
SQUAMOUS #/AREA URNS AUTO: ABNORMAL LPF
TRANS CELLS #/AREA URNS HPF: ABNORMAL LPF
UROBILINOGEN UR STRIP-ACNC: ABNORMAL
WBC #/AREA URNS AUTO: >100 HPF
WBC CLUMPS #/AREA URNS HPF: PRESENT /[HPF]

## 2020-03-19 LAB — BACTERIA SPEC CULT: ABNORMAL

## 2020-06-01 ENCOUNTER — RECORDS - HEALTHEAST (OUTPATIENT)
Dept: LAB | Facility: CLINIC | Age: 85
End: 2020-06-01

## 2020-06-01 LAB
ALBUMIN UR-MCNC: NEGATIVE MG/DL
APPEARANCE UR: CLEAR
BACTERIA #/AREA URNS HPF: ABNORMAL HPF
BILIRUB UR QL STRIP: NEGATIVE
CAOX CRY #/AREA URNS HPF: PRESENT /[HPF]
COLOR UR AUTO: YELLOW
GLUCOSE UR STRIP-MCNC: NEGATIVE MG/DL
HGB UR QL STRIP: NEGATIVE
KETONES UR STRIP-MCNC: NEGATIVE MG/DL
LEUKOCYTE ESTERASE UR QL STRIP: ABNORMAL
MUCOUS THREADS #/AREA URNS LPF: ABNORMAL LPF
NITRATE UR QL: NEGATIVE
PH UR STRIP: 6 [PH] (ref 4.5–8)
RBC #/AREA URNS AUTO: ABNORMAL HPF
SP GR UR STRIP: 1.02 (ref 1–1.03)
SQUAMOUS #/AREA URNS AUTO: ABNORMAL LPF
TRANS CELLS #/AREA URNS HPF: ABNORMAL LPF
UROBILINOGEN UR STRIP-ACNC: ABNORMAL
WBC #/AREA URNS AUTO: ABNORMAL HPF

## 2020-06-02 LAB — BACTERIA SPEC CULT: NORMAL

## 2020-07-12 NOTE — NURSING NOTE
The following medication was given:     MEDICATION: Vitamin B12  1000mcg  ROUTE: IM  SITE: Deltoid - Right  DOSE: 1.0ml  LOT #: [id number]  :  [employer]  EXPIRATION DATE:  11/2018  NDC#: 7857-8037-51  Ashlyn Dowell MA June 7, 2018 11:10 AM   Principal Discharge DX:	Leg pain

## 2020-07-28 ENCOUNTER — RECORDS - HEALTHEAST (OUTPATIENT)
Dept: LAB | Facility: CLINIC | Age: 85
End: 2020-07-28

## 2020-07-29 LAB
ALBUMIN SERPL-MCNC: 3 G/DL (ref 3.5–5)
ALP SERPL-CCNC: 59 U/L (ref 45–120)
ALT SERPL W P-5'-P-CCNC: 15 U/L (ref 0–45)
ANION GAP SERPL CALCULATED.3IONS-SCNC: 9 MMOL/L (ref 5–18)
AST SERPL W P-5'-P-CCNC: 24 U/L (ref 0–40)
BILIRUB SERPL-MCNC: 0.4 MG/DL (ref 0–1)
BUN SERPL-MCNC: 18 MG/DL (ref 8–28)
CALCIUM SERPL-MCNC: 9.2 MG/DL (ref 8.5–10.5)
CHLORIDE BLD-SCNC: 103 MMOL/L (ref 98–107)
CHOLEST SERPL-MCNC: 202 MG/DL
CO2 SERPL-SCNC: 30 MMOL/L (ref 22–31)
CREAT SERPL-MCNC: 1.03 MG/DL (ref 0.6–1.1)
ERYTHROCYTE [DISTWIDTH] IN BLOOD BY AUTOMATED COUNT: 20.7 % (ref 11–14.5)
FASTING STATUS PATIENT QL REPORTED: YES
GFR SERPL CREATININE-BSD FRML MDRD: 51 ML/MIN/1.73M2
GLUCOSE BLD-MCNC: 88 MG/DL (ref 70–125)
HCT VFR BLD AUTO: 41.9 % (ref 35–47)
HDLC SERPL-MCNC: 27 MG/DL
HGB BLD-MCNC: 11.4 G/DL (ref 12–16)
LDLC SERPL CALC-MCNC: 121 MG/DL
MCH RBC QN AUTO: 20.6 PG (ref 27–34)
MCHC RBC AUTO-ENTMCNC: 27.2 G/DL (ref 32–36)
MCV RBC AUTO: 76 FL (ref 80–100)
PLATELET # BLD AUTO: 408 THOU/UL (ref 140–440)
PMV BLD AUTO: 10.9 FL (ref 8.5–12.5)
POTASSIUM BLD-SCNC: 3.8 MMOL/L (ref 3.5–5)
PROT SERPL-MCNC: 7.4 G/DL (ref 6–8)
RBC # BLD AUTO: 5.53 MILL/UL (ref 3.8–5.4)
SODIUM SERPL-SCNC: 142 MMOL/L (ref 136–145)
TRIGL SERPL-MCNC: 268 MG/DL
VALPROATE SERPL-MCNC: 35.2 UG/ML (ref 50–150)
WBC: 8.8 THOU/UL (ref 4–11)

## 2020-07-30 LAB — 25(OH)D3 SERPL-MCNC: 33.9 NG/ML (ref 30–80)

## 2020-12-21 ENCOUNTER — RECORDS - HEALTHEAST (OUTPATIENT)
Dept: LAB | Facility: CLINIC | Age: 85
End: 2020-12-21

## 2020-12-21 LAB
ALBUMIN UR-MCNC: ABNORMAL MG/DL
AMORPH CRY #/AREA URNS HPF: ABNORMAL /[HPF]
APPEARANCE UR: ABNORMAL
BACTERIA #/AREA URNS HPF: ABNORMAL HPF
BILIRUB UR QL STRIP: NEGATIVE
COLOR UR AUTO: YELLOW
GLUCOSE UR STRIP-MCNC: NEGATIVE MG/DL
GRAN CASTS #/AREA URNS LPF: ABNORMAL LPF
HGB UR QL STRIP: NEGATIVE
HYALINE CASTS #/AREA URNS LPF: ABNORMAL LPF
KETONES UR STRIP-MCNC: NEGATIVE MG/DL
LEUKOCYTE ESTERASE UR QL STRIP: ABNORMAL
MUCOUS THREADS #/AREA URNS LPF: ABNORMAL LPF
NITRATE UR QL: NEGATIVE
PH UR STRIP: 6 [PH] (ref 4.5–8)
RBC #/AREA URNS AUTO: ABNORMAL HPF
SP GR UR STRIP: 1.02 (ref 1–1.03)
SQUAMOUS #/AREA URNS AUTO: ABNORMAL LPF
UROBILINOGEN UR STRIP-ACNC: ABNORMAL
WBC #/AREA URNS AUTO: >100 HPF

## 2020-12-22 LAB — BACTERIA SPEC CULT: NORMAL

## 2021-02-12 ENCOUNTER — RECORDS - HEALTHEAST (OUTPATIENT)
Dept: LAB | Facility: CLINIC | Age: 86
End: 2021-02-12

## 2021-02-12 LAB
SARS-COV-2 PCR COMMENT: NORMAL
SARS-COV-2 RNA SPEC QL NAA+PROBE: NEGATIVE
SARS-COV-2 VIRUS SPECIMEN SOURCE: NORMAL

## 2021-06-15 ENCOUNTER — RECORDS - HEALTHEAST (OUTPATIENT)
Dept: LAB | Facility: CLINIC | Age: 86
End: 2021-06-15

## 2021-06-15 LAB
ALBUMIN UR-MCNC: ABNORMAL G/DL
APPEARANCE UR: ABNORMAL
BACTERIA #/AREA URNS HPF: ABNORMAL /[HPF]
BILIRUB UR QL STRIP: NEGATIVE
CAOX CRY #/AREA URNS HPF: ABNORMAL /[HPF]
COLOR UR AUTO: YELLOW
GLUCOSE UR STRIP-MCNC: NEGATIVE MG/DL
HGB UR QL STRIP: NEGATIVE
KETONES UR STRIP-MCNC: NEGATIVE MG/DL
LEUKOCYTE ESTERASE UR QL STRIP: ABNORMAL
MUCOUS THREADS #/AREA URNS LPF: PRESENT LPF
NITRATE UR QL: POSITIVE
PH UR STRIP: 6 [PH] (ref 5–8)
RBC URINE: 1 HPF
SP GR UR STRIP: 1.03 (ref 1–1.03)
SQUAMOUS EPITHELIAL: 8 /HPF
TRANSITIONAL EPI: 2 /HPF
UROBILINOGEN UR STRIP-ACNC: ABNORMAL
WBC CLUMPS #/AREA URNS HPF: PRESENT /[HPF]
WBC URINE: >182 HPF

## 2021-06-17 LAB
BACTERIA SPEC CULT: ABNORMAL
BACTERIA SPEC CULT: ABNORMAL

## 2021-06-29 ENCOUNTER — RECORDS - HEALTHEAST (OUTPATIENT)
Dept: LAB | Facility: CLINIC | Age: 86
End: 2021-06-29

## 2021-06-30 LAB
ALBUMIN SERPL-MCNC: 3 G/DL (ref 3.5–5)
ALP SERPL-CCNC: 52 U/L (ref 45–120)
ALT SERPL W P-5'-P-CCNC: 14 U/L (ref 0–45)
ANION GAP SERPL CALCULATED.3IONS-SCNC: 13 MMOL/L (ref 5–18)
AST SERPL W P-5'-P-CCNC: 30 U/L (ref 0–40)
BILIRUB SERPL-MCNC: 0.5 MG/DL (ref 0–1)
BUN SERPL-MCNC: 20 MG/DL (ref 8–28)
CALCIUM SERPL-MCNC: 9.2 MG/DL (ref 8.5–10.5)
CHLORIDE BLD-SCNC: 102 MMOL/L (ref 98–107)
CHOLEST SERPL-MCNC: 219 MG/DL
CO2 SERPL-SCNC: 28 MMOL/L (ref 22–31)
CREAT SERPL-MCNC: 1.17 MG/DL (ref 0.6–1.1)
ERYTHROCYTE [DISTWIDTH] IN BLOOD BY AUTOMATED COUNT: 14.4 % (ref 11–14.5)
FASTING STATUS PATIENT QL REPORTED: ABNORMAL
GFR SERPL CREATININE-BSD FRML MDRD: 44 ML/MIN/1.73M2
GLUCOSE BLD-MCNC: 104 MG/DL (ref 70–125)
HCT VFR BLD AUTO: 47.1 % (ref 35–47)
HDLC SERPL-MCNC: 31 MG/DL
HGB BLD-MCNC: 15.2 G/DL (ref 12–16)
LDLC SERPL CALC-MCNC: 136 MG/DL
MCH RBC QN AUTO: 29.9 PG (ref 27–34)
MCHC RBC AUTO-ENTMCNC: 32.3 G/DL (ref 32–36)
MCV RBC AUTO: 93 FL (ref 80–100)
PLATELET # BLD AUTO: 293 THOU/UL (ref 140–440)
PMV BLD AUTO: 11 FL (ref 8.5–12.5)
POTASSIUM BLD-SCNC: 3.1 MMOL/L (ref 3.5–5)
PROT SERPL-MCNC: 7.2 G/DL (ref 6–8)
RBC # BLD AUTO: 5.09 MILL/UL (ref 3.8–5.4)
SODIUM SERPL-SCNC: 143 MMOL/L (ref 136–145)
TRIGL SERPL-MCNC: 258 MG/DL
WBC: 8.3 THOU/UL (ref 4–11)

## 2021-07-01 LAB — 25(OH)D3 SERPL-MCNC: 24.6 NG/ML (ref 30–80)

## 2021-07-21 ENCOUNTER — LAB REQUISITION (OUTPATIENT)
Dept: LAB | Facility: CLINIC | Age: 86
End: 2021-07-21
Payer: MEDICARE

## 2021-07-21 DIAGNOSIS — E87.6 HYPOKALEMIA: ICD-10-CM

## 2021-07-21 LAB — POTASSIUM BLD-SCNC: 4.1 MMOL/L (ref 3.5–5)

## 2021-07-21 PROCEDURE — 36415 COLL VENOUS BLD VENIPUNCTURE: CPT | Mod: ORL | Performed by: FAMILY MEDICINE

## 2021-07-21 PROCEDURE — P9603 ONE-WAY ALLOW PRORATED MILES: HCPCS | Mod: ORL | Performed by: FAMILY MEDICINE

## 2021-07-21 PROCEDURE — 84132 ASSAY OF SERUM POTASSIUM: CPT | Mod: ORL | Performed by: FAMILY MEDICINE

## 2021-08-03 ENCOUNTER — LAB REQUISITION (OUTPATIENT)
Dept: LAB | Facility: CLINIC | Age: 86
End: 2021-08-03
Payer: MEDICARE

## 2021-08-03 DIAGNOSIS — L40.0 PSORIASIS VULGARIS: ICD-10-CM

## 2021-08-04 PROCEDURE — P9604 ONE-WAY ALLOW PRORATED TRIP: HCPCS | Mod: ORL | Performed by: UROLOGY

## 2021-08-04 PROCEDURE — 36415 COLL VENOUS BLD VENIPUNCTURE: CPT | Mod: ORL | Performed by: UROLOGY

## 2021-08-04 PROCEDURE — 86481 TB AG RESPONSE T-CELL SUSP: CPT | Mod: ORL | Performed by: UROLOGY

## 2021-08-05 LAB
GAMMA INTERFERON BACKGROUND BLD IA-ACNC: 0.08 IU/ML
M TB IFN-G BLD-IMP: NEGATIVE
M TB IFN-G CD4+ BCKGRND COR BLD-ACNC: 9.92 IU/ML
MITOGEN IGNF BCKGRD COR BLD-ACNC: 0 IU/ML
MITOGEN IGNF BCKGRD COR BLD-ACNC: 0 IU/ML
QUANTIFERON MITOGEN: 10 IU/ML
QUANTIFERON NIL TUBE: 0.08 IU/ML
QUANTIFERON TB1 TUBE: 0.08 IU/ML
QUANTIFERON TB2 TUBE: 0.08

## 2021-09-07 ENCOUNTER — LAB REQUISITION (OUTPATIENT)
Dept: LAB | Facility: CLINIC | Age: 86
End: 2021-09-07
Payer: MEDICARE

## 2021-09-07 DIAGNOSIS — Z79.899 OTHER LONG TERM (CURRENT) DRUG THERAPY: ICD-10-CM

## 2021-09-08 LAB
ALBUMIN SERPL-MCNC: 3 G/DL (ref 3.5–5)
ALP SERPL-CCNC: 53 U/L (ref 45–120)
ALT SERPL W P-5'-P-CCNC: 18 U/L (ref 0–45)
ANION GAP SERPL CALCULATED.3IONS-SCNC: 10 MMOL/L (ref 5–18)
AST SERPL W P-5'-P-CCNC: 32 U/L (ref 0–40)
BILIRUB SERPL-MCNC: 0.5 MG/DL (ref 0–1)
BUN SERPL-MCNC: 17 MG/DL (ref 8–28)
CALCIUM SERPL-MCNC: 9.7 MG/DL (ref 8.5–10.5)
CHLORIDE BLD-SCNC: 104 MMOL/L (ref 98–107)
CHOLEST SERPL-MCNC: 221 MG/DL
CO2 SERPL-SCNC: 29 MMOL/L (ref 22–31)
CREAT SERPL-MCNC: 1.28 MG/DL (ref 0.6–1.1)
ERYTHROCYTE [DISTWIDTH] IN BLOOD BY AUTOMATED COUNT: 13.8 % (ref 10–15)
GFR SERPL CREATININE-BSD FRML MDRD: 37 ML/MIN/1.73M2
GLUCOSE BLD-MCNC: 121 MG/DL (ref 70–125)
HBA1C MFR BLD: 5.6 %
HCT VFR BLD AUTO: 49.5 % (ref 35–47)
HDLC SERPL-MCNC: 33 MG/DL
HGB BLD-MCNC: 15.9 G/DL (ref 11.7–15.7)
LDLC SERPL CALC-MCNC: 130 MG/DL
MCH RBC QN AUTO: 29.6 PG (ref 26.5–33)
MCHC RBC AUTO-ENTMCNC: 32.1 G/DL (ref 31.5–36.5)
MCV RBC AUTO: 92 FL (ref 78–100)
PLATELET # BLD AUTO: 288 10E3/UL (ref 150–450)
POTASSIUM BLD-SCNC: 3.8 MMOL/L (ref 3.5–5)
PROT SERPL-MCNC: 7.4 G/DL (ref 6–8)
RBC # BLD AUTO: 5.38 10E6/UL (ref 3.8–5.2)
SODIUM SERPL-SCNC: 143 MMOL/L (ref 136–145)
TRIGL SERPL-MCNC: 292 MG/DL
VALPROATE SERPL-MCNC: 27.4 UG/ML
WBC # BLD AUTO: 7.8 10E3/UL (ref 4–11)

## 2021-09-08 PROCEDURE — 36415 COLL VENOUS BLD VENIPUNCTURE: CPT | Mod: ORL | Performed by: NURSE PRACTITIONER

## 2021-09-08 PROCEDURE — P9604 ONE-WAY ALLOW PRORATED TRIP: HCPCS | Mod: ORL | Performed by: NURSE PRACTITIONER

## 2021-09-08 PROCEDURE — 80061 LIPID PANEL: CPT | Mod: ORL | Performed by: NURSE PRACTITIONER

## 2021-09-08 PROCEDURE — 80164 ASSAY DIPROPYLACETIC ACD TOT: CPT | Mod: ORL | Performed by: NURSE PRACTITIONER

## 2021-09-08 PROCEDURE — 83036 HEMOGLOBIN GLYCOSYLATED A1C: CPT | Mod: ORL | Performed by: NURSE PRACTITIONER

## 2021-09-08 PROCEDURE — 85027 COMPLETE CBC AUTOMATED: CPT | Mod: ORL | Performed by: NURSE PRACTITIONER

## 2021-09-08 PROCEDURE — 80053 COMPREHEN METABOLIC PANEL: CPT | Mod: ORL | Performed by: NURSE PRACTITIONER

## 2022-01-03 ENCOUNTER — LAB REQUISITION (OUTPATIENT)
Dept: LAB | Facility: CLINIC | Age: 87
End: 2022-01-03
Payer: MEDICARE

## 2022-01-03 DIAGNOSIS — J18.9 PNEUMONIA, UNSPECIFIED ORGANISM: ICD-10-CM

## 2022-01-03 DIAGNOSIS — R53.1 WEAKNESS: ICD-10-CM

## 2022-01-04 ENCOUNTER — LAB REQUISITION (OUTPATIENT)
Dept: LAB | Facility: CLINIC | Age: 87
End: 2022-01-04
Payer: MEDICARE

## 2022-01-04 DIAGNOSIS — R09.81 NASAL CONGESTION: ICD-10-CM

## 2022-01-04 LAB
FLUAV AG SPEC QL IA: NEGATIVE
FLUBV AG SPEC QL IA: NEGATIVE

## 2022-01-04 PROCEDURE — 87804 INFLUENZA ASSAY W/OPTIC: CPT | Mod: ORL | Performed by: NURSE PRACTITIONER

## 2022-01-07 LAB
ANION GAP SERPL CALCULATED.3IONS-SCNC: 10 MMOL/L (ref 5–18)
BASOPHILS # BLD AUTO: 0.1 10E3/UL (ref 0–0.2)
BASOPHILS NFR BLD AUTO: 1 %
BUN SERPL-MCNC: 18 MG/DL (ref 8–28)
CALCIUM SERPL-MCNC: 9.3 MG/DL (ref 8.5–10.5)
CHLORIDE BLD-SCNC: 104 MMOL/L (ref 98–107)
CO2 SERPL-SCNC: 29 MMOL/L (ref 22–31)
CREAT SERPL-MCNC: 1.11 MG/DL (ref 0.6–1.1)
EOSINOPHIL # BLD AUTO: 0.4 10E3/UL (ref 0–0.7)
EOSINOPHIL NFR BLD AUTO: 6 %
ERYTHROCYTE [DISTWIDTH] IN BLOOD BY AUTOMATED COUNT: 13.7 % (ref 10–15)
GFR SERPL CREATININE-BSD FRML MDRD: 47 ML/MIN/1.73M2
GLUCOSE BLD-MCNC: 86 MG/DL (ref 70–125)
HCT VFR BLD AUTO: 45.2 % (ref 35–47)
HGB BLD-MCNC: 14.8 G/DL (ref 11.7–15.7)
IMM GRANULOCYTES # BLD: 0.1 10E3/UL
IMM GRANULOCYTES NFR BLD: 1 %
LYMPHOCYTES # BLD AUTO: 2.7 10E3/UL (ref 0.8–5.3)
LYMPHOCYTES NFR BLD AUTO: 34 %
MCH RBC QN AUTO: 30.1 PG (ref 26.5–33)
MCHC RBC AUTO-ENTMCNC: 32.7 G/DL (ref 31.5–36.5)
MCV RBC AUTO: 92 FL (ref 78–100)
MONOCYTES # BLD AUTO: 0.9 10E3/UL (ref 0–1.3)
MONOCYTES NFR BLD AUTO: 11 %
NEUTROPHILS # BLD AUTO: 3.8 10E3/UL (ref 1.6–8.3)
NEUTROPHILS NFR BLD AUTO: 47 %
NRBC # BLD AUTO: 0 10E3/UL
NRBC BLD AUTO-RTO: 0 /100
PLATELET # BLD AUTO: 283 10E3/UL (ref 150–450)
POTASSIUM BLD-SCNC: 4.4 MMOL/L (ref 3.5–5)
RBC # BLD AUTO: 4.91 10E6/UL (ref 3.8–5.2)
SODIUM SERPL-SCNC: 143 MMOL/L (ref 136–145)
WBC # BLD AUTO: 8 10E3/UL (ref 4–11)

## 2022-01-07 PROCEDURE — 36415 COLL VENOUS BLD VENIPUNCTURE: CPT | Mod: ORL | Performed by: NURSE PRACTITIONER

## 2022-01-07 PROCEDURE — 80048 BASIC METABOLIC PNL TOTAL CA: CPT | Mod: ORL | Performed by: NURSE PRACTITIONER

## 2022-01-07 PROCEDURE — 85025 COMPLETE CBC W/AUTO DIFF WBC: CPT | Mod: ORL | Performed by: NURSE PRACTITIONER

## 2022-02-21 ENCOUNTER — LAB REQUISITION (OUTPATIENT)
Dept: LAB | Facility: CLINIC | Age: 87
End: 2022-02-21
Payer: MEDICARE

## 2022-02-21 DIAGNOSIS — E87.0 HYPEROSMOLALITY AND HYPERNATREMIA: ICD-10-CM

## 2022-02-23 LAB — POTASSIUM BLD-SCNC: 3.9 MMOL/L (ref 3.5–5)

## 2022-02-23 PROCEDURE — 84132 ASSAY OF SERUM POTASSIUM: CPT | Mod: ORL | Performed by: FAMILY MEDICINE

## 2022-02-23 PROCEDURE — P9604 ONE-WAY ALLOW PRORATED TRIP: HCPCS | Mod: ORL | Performed by: FAMILY MEDICINE

## 2022-02-23 PROCEDURE — 36415 COLL VENOUS BLD VENIPUNCTURE: CPT | Mod: ORL | Performed by: FAMILY MEDICINE

## 2022-02-28 ENCOUNTER — LAB REQUISITION (OUTPATIENT)
Dept: LAB | Facility: CLINIC | Age: 87
End: 2022-02-28
Payer: MEDICARE

## 2022-02-28 DIAGNOSIS — E87.6 HYPOKALEMIA: ICD-10-CM

## 2022-03-02 LAB — POTASSIUM BLD-SCNC: 3.8 MMOL/L (ref 3.5–5)

## 2022-03-02 PROCEDURE — P9603 ONE-WAY ALLOW PRORATED MILES: HCPCS | Mod: ORL | Performed by: NURSE PRACTITIONER

## 2022-03-02 PROCEDURE — 84132 ASSAY OF SERUM POTASSIUM: CPT | Mod: ORL | Performed by: NURSE PRACTITIONER

## 2022-03-02 PROCEDURE — 36415 COLL VENOUS BLD VENIPUNCTURE: CPT | Mod: ORL | Performed by: NURSE PRACTITIONER

## 2022-06-28 ENCOUNTER — LAB REQUISITION (OUTPATIENT)
Dept: LAB | Facility: CLINIC | Age: 87
End: 2022-06-28
Payer: MEDICARE

## 2022-06-28 DIAGNOSIS — J43.1 PANLOBULAR EMPHYSEMA (H): ICD-10-CM

## 2022-06-28 DIAGNOSIS — E55.9 VITAMIN D DEFICIENCY, UNSPECIFIED: ICD-10-CM

## 2022-06-28 DIAGNOSIS — E78.1 PURE HYPERGLYCERIDEMIA: ICD-10-CM

## 2022-06-29 LAB
ALBUMIN SERPL BCG-MCNC: 2.9 G/DL (ref 3.5–5.2)
ALP SERPL-CCNC: 55 U/L (ref 35–104)
ALT SERPL W P-5'-P-CCNC: 12 U/L (ref 10–35)
ANION GAP SERPL CALCULATED.3IONS-SCNC: 13 MMOL/L (ref 7–15)
AST SERPL W P-5'-P-CCNC: 36 U/L (ref 10–35)
BASOPHILS # BLD AUTO: 0.1 10E3/UL (ref 0–0.2)
BASOPHILS NFR BLD AUTO: 1 %
BILIRUB SERPL-MCNC: 0.2 MG/DL
BUN SERPL-MCNC: 12.9 MG/DL (ref 8–23)
CALCIUM SERPL-MCNC: 9 MG/DL (ref 8.8–10.2)
CHLORIDE SERPL-SCNC: 106 MMOL/L (ref 98–107)
CHOLEST SERPL-MCNC: 186 MG/DL
CREAT SERPL-MCNC: 0.95 MG/DL (ref 0.51–0.95)
DEPRECATED HCO3 PLAS-SCNC: 25 MMOL/L (ref 22–29)
EOSINOPHIL # BLD AUTO: 0.6 10E3/UL (ref 0–0.7)
EOSINOPHIL NFR BLD AUTO: 7 %
ERYTHROCYTE [DISTWIDTH] IN BLOOD BY AUTOMATED COUNT: 13.7 % (ref 10–15)
GFR SERPL CREATININE-BSD FRML MDRD: 57 ML/MIN/1.73M2
GLUCOSE SERPL-MCNC: 69 MG/DL (ref 70–99)
HCT VFR BLD AUTO: 44.1 % (ref 35–47)
HDLC SERPL-MCNC: 19 MG/DL
HGB BLD-MCNC: 14.2 G/DL (ref 11.7–15.7)
IMM GRANULOCYTES # BLD: 0.1 10E3/UL
IMM GRANULOCYTES NFR BLD: 2 %
LDLC SERPL CALC-MCNC: 124 MG/DL
LYMPHOCYTES # BLD AUTO: 2.4 10E3/UL (ref 0.8–5.3)
LYMPHOCYTES NFR BLD AUTO: 31 %
MCH RBC QN AUTO: 29 PG (ref 26.5–33)
MCHC RBC AUTO-ENTMCNC: 32.2 G/DL (ref 31.5–36.5)
MCV RBC AUTO: 90 FL (ref 78–100)
MONOCYTES # BLD AUTO: 0.8 10E3/UL (ref 0–1.3)
MONOCYTES NFR BLD AUTO: 10 %
NEUTROPHILS # BLD AUTO: 4 10E3/UL (ref 1.6–8.3)
NEUTROPHILS NFR BLD AUTO: 49 %
NONHDLC SERPL-MCNC: 167 MG/DL
NRBC # BLD AUTO: 0 10E3/UL
NRBC BLD AUTO-RTO: 0 /100
PLATELET # BLD AUTO: 427 10E3/UL (ref 150–450)
POTASSIUM SERPL-SCNC: 3.9 MMOL/L (ref 3.4–5.3)
PROT SERPL-MCNC: 6.5 G/DL (ref 6.4–8.3)
RBC # BLD AUTO: 4.89 10E6/UL (ref 3.8–5.2)
SODIUM SERPL-SCNC: 144 MMOL/L (ref 136–145)
TRIGL SERPL-MCNC: 215 MG/DL
WBC # BLD AUTO: 8 10E3/UL (ref 4–11)

## 2022-06-29 PROCEDURE — 80053 COMPREHEN METABOLIC PANEL: CPT | Mod: ORL | Performed by: NURSE PRACTITIONER

## 2022-06-29 PROCEDURE — 80061 LIPID PANEL: CPT | Mod: ORL | Performed by: NURSE PRACTITIONER

## 2022-06-29 PROCEDURE — 36415 COLL VENOUS BLD VENIPUNCTURE: CPT | Mod: ORL | Performed by: NURSE PRACTITIONER

## 2022-06-29 PROCEDURE — 85025 COMPLETE CBC W/AUTO DIFF WBC: CPT | Mod: ORL | Performed by: NURSE PRACTITIONER

## 2022-06-29 PROCEDURE — P9603 ONE-WAY ALLOW PRORATED MILES: HCPCS | Mod: ORL | Performed by: NURSE PRACTITIONER

## 2022-06-30 ENCOUNTER — LAB REQUISITION (OUTPATIENT)
Dept: LAB | Facility: CLINIC | Age: 87
End: 2022-06-30
Payer: MEDICARE

## 2022-06-30 DIAGNOSIS — E78.1 PURE HYPERGLYCERIDEMIA: ICD-10-CM

## 2022-06-30 DIAGNOSIS — E55.9 VITAMIN D DEFICIENCY, UNSPECIFIED: ICD-10-CM

## 2022-06-30 PROCEDURE — 36415 COLL VENOUS BLD VENIPUNCTURE: CPT | Mod: ORL | Performed by: NURSE PRACTITIONER

## 2022-06-30 PROCEDURE — 82306 VITAMIN D 25 HYDROXY: CPT | Mod: ORL | Performed by: NURSE PRACTITIONER

## 2022-06-30 PROCEDURE — P9603 ONE-WAY ALLOW PRORATED MILES: HCPCS | Mod: ORL | Performed by: NURSE PRACTITIONER

## 2022-07-01 LAB — DEPRECATED CALCIDIOL+CALCIFEROL SERPL-MC: 49 UG/L (ref 20–75)

## 2022-07-29 ENCOUNTER — APPOINTMENT (OUTPATIENT)
Dept: CT IMAGING | Facility: CLINIC | Age: 87
End: 2022-07-29
Attending: EMERGENCY MEDICINE
Payer: MEDICARE

## 2022-07-29 ENCOUNTER — APPOINTMENT (OUTPATIENT)
Dept: MRI IMAGING | Facility: CLINIC | Age: 87
End: 2022-07-29
Payer: MEDICARE

## 2022-07-29 ENCOUNTER — HOSPITAL ENCOUNTER (EMERGENCY)
Facility: CLINIC | Age: 87
Discharge: HOME | End: 2022-07-30
Attending: EMERGENCY MEDICINE | Admitting: EMERGENCY MEDICINE
Payer: MEDICARE

## 2022-07-29 DIAGNOSIS — N30.01 ACUTE CYSTITIS WITH HEMATURIA: ICD-10-CM

## 2022-07-29 DIAGNOSIS — R41.82 ALTERED MENTAL STATUS, UNSPECIFIED ALTERED MENTAL STATUS TYPE: ICD-10-CM

## 2022-07-29 DIAGNOSIS — R79.89 ELEVATED TROPONIN: ICD-10-CM

## 2022-07-29 LAB
ALBUMIN UR-MCNC: 20 MG/DL
AMMONIA PLAS-SCNC: 26 UMOL/L (ref 11–51)
ANION GAP SERPL CALCULATED.3IONS-SCNC: 9 MMOL/L (ref 7–15)
APPEARANCE UR: ABNORMAL
APTT PPP: 28 SECONDS (ref 22–38)
BACTERIA #/AREA URNS HPF: ABNORMAL /HPF
BASE EXCESS BLDV CALC-SCNC: 8.2 MMOL/L (ref -7.7–1.9)
BASOPHILS # BLD AUTO: 0 10E3/UL (ref 0–0.2)
BASOPHILS NFR BLD AUTO: 0 %
BILIRUB UR QL STRIP: NEGATIVE
BUN SERPL-MCNC: 16.5 MG/DL (ref 8–23)
BUN SERPL-MCNC: 18 MG/DL (ref 7–30)
CA-I BLD-MCNC: 4.7 MG/DL (ref 4.4–5.2)
CALCIUM SERPL-MCNC: 8.8 MG/DL (ref 8.8–10.2)
CHLORIDE BLD-SCNC: 98 MMOL/L (ref 94–109)
CHLORIDE SERPL-SCNC: 101 MMOL/L (ref 98–107)
CO2 BLD-SCNC: 29 MMOL/L (ref 20–32)
COLOR UR AUTO: YELLOW
CREAT BLD-MCNC: 1.2 MG/DL (ref 0.5–1)
CREAT SERPL-MCNC: 1.12 MG/DL (ref 0.51–0.95)
DEPRECATED HCO3 PLAS-SCNC: 30 MMOL/L (ref 22–29)
EOSINOPHIL # BLD AUTO: 0.1 10E3/UL (ref 0–0.7)
EOSINOPHIL NFR BLD AUTO: 1 %
ERYTHROCYTE [DISTWIDTH] IN BLOOD BY AUTOMATED COUNT: 14.1 % (ref 10–15)
ETHANOL SERPL-MCNC: <0.01 G/DL
GFR SERPL CREATININE-BSD FRML MDRD: 47 ML/MIN/1.73M2
GLUCOSE BLD-MCNC: 105 MG/DL (ref 70–99)
GLUCOSE BLDC GLUCOMTR-MCNC: 118 MG/DL (ref 70–99)
GLUCOSE SERPL-MCNC: 106 MG/DL (ref 70–99)
GLUCOSE UR STRIP-MCNC: NEGATIVE MG/DL
HCO3 BLDV-SCNC: 35 MMOL/L (ref 21–28)
HCT VFR BLD AUTO: 50.4 % (ref 35–47)
HCT VFR BLD CALC: 45 % (ref 35–47)
HGB BLD-MCNC: 15.3 G/DL (ref 11.7–15.7)
HGB BLD-MCNC: 16.1 G/DL (ref 11.7–15.7)
HGB UR QL STRIP: NEGATIVE
HOLD SPECIMEN: NORMAL
IMM GRANULOCYTES # BLD: 0.1 10E3/UL
IMM GRANULOCYTES NFR BLD: 1 %
INR PPP: 1.03 (ref 0.85–1.15)
KETONES UR STRIP-MCNC: NEGATIVE MG/DL
LEUKOCYTE ESTERASE UR QL STRIP: ABNORMAL
LYMPHOCYTES # BLD AUTO: 2.9 10E3/UL (ref 0.8–5.3)
LYMPHOCYTES NFR BLD AUTO: 28 %
MCH RBC QN AUTO: 28.9 PG (ref 26.5–33)
MCHC RBC AUTO-ENTMCNC: 31.9 G/DL (ref 31.5–36.5)
MCV RBC AUTO: 90 FL (ref 78–100)
MONOCYTES # BLD AUTO: 1 10E3/UL (ref 0–1.3)
MONOCYTES NFR BLD AUTO: 9 %
MUCOUS THREADS #/AREA URNS LPF: PRESENT /LPF
NEUTROPHILS # BLD AUTO: 6 10E3/UL (ref 1.6–8.3)
NEUTROPHILS NFR BLD AUTO: 61 %
NITRATE UR QL: POSITIVE
NRBC # BLD AUTO: 0 10E3/UL
NRBC BLD AUTO-RTO: 0 /100
O2/TOTAL GAS SETTING VFR VENT: 16.7 %
OXYHGB MFR BLDV: 17 % (ref 70–75)
PCO2 BLDV: 55 MM HG (ref 40–50)
PH BLDV: 7.41 [PH] (ref 7.32–7.43)
PH UR STRIP: 6 [PH] (ref 5–7)
PLATELET # BLD AUTO: 298 10E3/UL (ref 150–450)
PO2 BLDV: 16 MM HG (ref 25–47)
POTASSIUM BLD-SCNC: 3.3 MMOL/L (ref 3.4–5.3)
POTASSIUM SERPL-SCNC: 3.4 MMOL/L (ref 3.4–5.3)
RBC # BLD AUTO: 5.58 10E6/UL (ref 3.8–5.2)
RBC URINE: 3 /HPF
SODIUM BLD-SCNC: 141 MMOL/L (ref 133–144)
SODIUM SERPL-SCNC: 140 MMOL/L (ref 136–145)
SP GR UR STRIP: 1.02 (ref 1–1.03)
SQUAMOUS EPITHELIAL: 1 /HPF
TROPONIN T SERPL HS-MCNC: 20 NG/L
TROPONIN T SERPL HS-MCNC: 21 NG/L
TROPONIN T SERPL HS-MCNC: 23 NG/L
UROBILINOGEN UR STRIP-MCNC: 2 MG/DL
WBC # BLD AUTO: 10.2 10E3/UL (ref 4–11)
WBC URINE: 140 /HPF

## 2022-07-29 PROCEDURE — 84484 ASSAY OF TROPONIN QUANT: CPT | Performed by: EMERGENCY MEDICINE

## 2022-07-29 PROCEDURE — 85025 COMPLETE CBC W/AUTO DIFF WBC: CPT | Performed by: EMERGENCY MEDICINE

## 2022-07-29 PROCEDURE — 82140 ASSAY OF AMMONIA: CPT | Performed by: EMERGENCY MEDICINE

## 2022-07-29 PROCEDURE — 96365 THER/PROPH/DIAG IV INF INIT: CPT | Mod: 59

## 2022-07-29 PROCEDURE — 82077 ASSAY SPEC XCP UR&BREATH IA: CPT | Performed by: EMERGENCY MEDICINE

## 2022-07-29 PROCEDURE — 70551 MRI BRAIN STEM W/O DYE: CPT | Mod: MA

## 2022-07-29 PROCEDURE — 80048 BASIC METABOLIC PNL TOTAL CA: CPT | Performed by: EMERGENCY MEDICINE

## 2022-07-29 PROCEDURE — 85730 THROMBOPLASTIN TIME PARTIAL: CPT | Performed by: EMERGENCY MEDICINE

## 2022-07-29 PROCEDURE — 70496 CT ANGIOGRAPHY HEAD: CPT | Mod: MG

## 2022-07-29 PROCEDURE — 85014 HEMATOCRIT: CPT

## 2022-07-29 PROCEDURE — 70498 CT ANGIOGRAPHY NECK: CPT | Mod: MG

## 2022-07-29 PROCEDURE — 250N000011 HC RX IP 250 OP 636: Performed by: EMERGENCY MEDICINE

## 2022-07-29 PROCEDURE — 99285 EMERGENCY DEPT VISIT HI MDM: CPT | Mod: 25

## 2022-07-29 PROCEDURE — 82805 BLOOD GASES W/O2 SATURATION: CPT | Performed by: EMERGENCY MEDICINE

## 2022-07-29 PROCEDURE — G1010 CDSM STANSON: HCPCS

## 2022-07-29 PROCEDURE — 85610 PROTHROMBIN TIME: CPT | Performed by: EMERGENCY MEDICINE

## 2022-07-29 PROCEDURE — 36415 COLL VENOUS BLD VENIPUNCTURE: CPT | Performed by: EMERGENCY MEDICINE

## 2022-07-29 PROCEDURE — 81001 URINALYSIS AUTO W/SCOPE: CPT | Performed by: EMERGENCY MEDICINE

## 2022-07-29 PROCEDURE — 87186 SC STD MICRODIL/AGAR DIL: CPT | Performed by: EMERGENCY MEDICINE

## 2022-07-29 PROCEDURE — 93005 ELECTROCARDIOGRAM TRACING: CPT

## 2022-07-29 RX ORDER — CEPHALEXIN 500 MG/1
500 CAPSULE ORAL 3 TIMES DAILY
Qty: 30 CAPSULE | Refills: 0 | Status: SHIPPED | OUTPATIENT
Start: 2022-07-30 | End: 2022-08-09

## 2022-07-29 RX ORDER — IOPAMIDOL 755 MG/ML
500 INJECTION, SOLUTION INTRAVASCULAR ONCE
Status: DISCONTINUED | OUTPATIENT
Start: 2022-07-29 | End: 2022-07-29 | Stop reason: CLARIF

## 2022-07-29 RX ORDER — CEFTRIAXONE 1 G/1
1 INJECTION, POWDER, FOR SOLUTION INTRAMUSCULAR; INTRAVENOUS ONCE
Status: COMPLETED | OUTPATIENT
Start: 2022-07-29 | End: 2022-07-29

## 2022-07-29 RX ORDER — IOPAMIDOL 755 MG/ML
500 INJECTION, SOLUTION INTRAVASCULAR ONCE
Status: COMPLETED | OUTPATIENT
Start: 2022-07-29 | End: 2022-07-29

## 2022-07-29 RX ADMIN — IOPAMIDOL 75 ML: 755 INJECTION, SOLUTION INTRAVENOUS at 19:04

## 2022-07-29 RX ADMIN — CEFTRIAXONE 1 G: 1 INJECTION, POWDER, FOR SOLUTION INTRAMUSCULAR; INTRAVENOUS at 23:31

## 2022-07-29 NOTE — ED TRIAGE NOTES
BIBA from memory care. Was wating dinner with daughter who noted sudden change in LOC, slurred speech and increased agitation. BG from . EMS inserted PIV x2 in bilateral ACs. Code stroke called by JASIEL Oshea upon arrival  Patient responds to pain only- not following commands. Patient baseline by staff is alert and walks with walker with only mild.  memory issues   ABC intact     Triage Assessment     Row Name 07/29/22 5883       Triage Assessment (Adult)    Airway WDL WDL       Respiratory WDL    Respiratory WDL WDL       Skin Circulation/Temperature WDL    Skin Circulation/Temperature WDL WDL       Cardiac WDL    Cardiac WDL WDL       Peripheral/Neurovascular WDL    Peripheral Neurovascular WDL WDL       Cognitive/Neuro/Behavioral WDL    Cognitive/Neuro/Behavioral WDL X;level of consciousness    Level of Consciousness confused    Arousal Level arouses to pain;arouses to voice;arouses to touch/gentle shaking    Orientation disoriented to;place;time    Speech slurred    Mood/Behavior agitated

## 2022-07-29 NOTE — ED PROVIDER NOTES
History   Chief Complaint:  Altered Mental Status       The history is provided by the EMS personnel.      Tina Powell is a 89 year old female with history of dementia who presents with altered mental status. EMS states that the patient was eating with her daughter at 1800 at her memory care facility when she abruptly started to become less responsive. She went from a conversant with some confusion at baseline to a moaning state where she just repeatedly states that everything hurts which is how she presents to the ED. EMS states that she usually takes Plavix.  Skilled nursing facility called and last dose of Plavix was given at 7:49 AM today.    Review of Systems   Unable to perform ROS: Mental status change     Allergies:  Sulfa Drugs  Hmg-Coa-R Inhibitors    Medications:  Humira   Cholecalciferol    Plavix   Hydrochlorothiazide   Norco  Macrobid   Prilosec   Seroquel   Effexor     Past Medical History:     Psoriasis   Dyslipidemia   CVA  Cerebral infarction   Risk of falls  Sepsis   Depression   Alcoholism   Tobacco abuse  Encephalopathy    Hypertension   Hyperlipidemia   Dementia   Alzheimer's   Anxiety   Vitamin b 12 deficiency   Vitamin D deficiency   Cognitive impairment   UTI  Weakness     Past Surgical History:    Hysterectomy      Family History:    Cerebrovascular disease  Heart disease     Social History:  Patient came from [facility].  Patient is unaccompanied in the ED.    Physical Exam     Patient Vitals for the past 24 hrs:   BP Pulse Resp SpO2   07/29/22 1850 108/73 90 16 94 %       Physical Exam    General:   Ill-appearing  female complaining of pain to intermittent areas of her body  HEENT:    Oropharynx is moist, without lesions or trismus.  Eyes:    Conjunctiva normal     PERRL     EOMs intact   Neck:    Supple, no meningismus.     CV:     Regular rate and rhythm.      No murmurs, rubs or gallops.       No unilateral leg swelling.       2+ radial pulses bilateral.     PULM:    Clear to auscultation bilateral.       No respiratory distress.      Good air exchange.     No rales or wheezing.     No stridor.  ABD:    Soft, non-tender, non-distended.       No pulsatile masses.       No rebound, guarding or rigidity.  MSK:     No gross deformity to all four extremities.   LYMPH:   No cervical lymphadenopathy.  NEURO:   Mild somnolence     GCS: E3 V3 M6 = 12     Intermittently following commands      Strength is grossly symmetric in all 4 extremities      No clonus or tremor      Good muscle tone, no atrophy.  Skin:    Warm, dry and intact.          Emergency Department Course   ECG  ECG obtained at 2104, ECG read at 2112  Sinus rhythm with occasional premature ventricular complexes   Left axis deviation   Possible anterior infarct, age undetermined   Abnormal ECG   No significant difference from prior ECG dated 6/8/18.  Rate 76 bpm. DE interval 196 ms. QRS duration 116 ms. QT/QTc 400/450 ms. P-R-T axes -17 -58 118.    Imaging:  MR Brain w/o Contrast   Final Result   IMPRESSION:   1.  No acute intracranial process.   2.  Generalized brain atrophy and presumed microvascular ischemic changes as detailed above.      CTA Head Neck w Contrast   Final Result   IMPRESSION:    HEAD CT:   1.  No intracranial hemorrhage, mass lesions, hydrocephalus or CT evidence for an acute infarct.   2.  Mild diffuse cerebral parenchymal volume loss. Presumed chronic hypertensive/microvascular ischemic white matter changes.   3.  Small chronic infarct involving the anterior right thalamus.          Discussed the head CT findings with Dr. Renzo Oshea at 7:07 PM, 07/29/2022.      HEAD CTA:    1.  Multifocal intracranial narrowings as detailed above, suspicious for atherosclerotic narrowings.   2.  High-grade narrowing of the proximal P2 segment of the left posterior cerebral artery. Moderate to high-grade narrowing of the distal P1 segment of the left posterior cerebral artery.    3.  Multifocal moderate  narrowings of the A2 segment of the right anterior cerebral artery. High-grade narrowing of distal M2/proximal M3 segment of the right middle cerebral artery. Moderate narrowing of the proximal M2 segment of the left middle    cerebral artery.      NECK CTA:   1.  No hemodynamically significant narrowing of the internal carotid arteries bilaterally based on the NASCET criteria.   2.  The vertebral arteries are patent throughout their course in the neck.         Discussed the CTA findings with Dr. Renzo Oshea at 7:18 PM, 07/29/2022.      CT Head w/o Contrast   Final Result   IMPRESSION:    HEAD CT:   1.  No intracranial hemorrhage, mass lesions, hydrocephalus or CT evidence for an acute infarct.   2.  Mild diffuse cerebral parenchymal volume loss. Presumed chronic hypertensive/microvascular ischemic white matter changes.   3.  Small chronic infarct involving the anterior right thalamus.          Discussed the head CT findings with Dr. Renzo Oshea at 7:07 PM, 07/29/2022.      HEAD CTA:    1.  Multifocal intracranial narrowings as detailed above, suspicious for atherosclerotic narrowings.   2.  High-grade narrowing of the proximal P2 segment of the left posterior cerebral artery. Moderate to high-grade narrowing of the distal P1 segment of the left posterior cerebral artery.    3.  Multifocal moderate narrowings of the A2 segment of the right anterior cerebral artery. High-grade narrowing of distal M2/proximal M3 segment of the right middle cerebral artery. Moderate narrowing of the proximal M2 segment of the left middle    cerebral artery.      NECK CTA:   1.  No hemodynamically significant narrowing of the internal carotid arteries bilaterally based on the NASCET criteria.   2.  The vertebral arteries are patent throughout their course in the neck.         Discussed the CTA findings with Dr. Renzo Oshea at 7:18 PM, 07/29/2022.        Report per radiology    Laboratory:  Labs Ordered and Resulted  from Time of ED Arrival to Time of ED Departure   ETHYL ALCOHOL LEVEL - Abnormal       Result Value    Alcohol ethyl <0.01 (*)    GLUCOSE BY METER - Abnormal    GLUCOSE BY METER POCT 118 (*)    BASIC METABOLIC PANEL - Abnormal    Creatinine 1.12 (*)     Sodium 140      Potassium 3.4      Urea Nitrogen 16.5      Chloride 101      Carbon Dioxide (CO2) 30 (*)     Anion Gap 9      Glucose 106 (*)     GFR Estimate 47 (*)     Calcium 8.8     TROPONIN T, HIGH SENSITIVITY - Abnormal    Troponin T, High Sensitivity 20 (*)    CBC WITH PLATELETS AND DIFFERENTIAL - Abnormal    WBC Count 10.2      RBC Count 5.58 (*)     Hemoglobin 16.1 (*)     Hematocrit 50.4 (*)     MCV 90      MCH 28.9      MCHC 31.9      RDW 14.1      Platelet Count 298      % Neutrophils 61      % Lymphocytes 28      % Monocytes 9      % Eosinophils 1      % Basophils 0      % Immature Granulocytes 1      NRBCs per 100 WBC 0      Absolute Neutrophils 6.0      Absolute Lymphocytes 2.9      Absolute Monocytes 1.0      Absolute Eosinophils 0.1      Absolute Basophils 0.0      Absolute Immature Granulocytes 0.1      Absolute NRBCs 0.0     ISTAT BASIC CHEM ICA HEMATOCRIT POCT - Abnormal    TOTAL CO2 POCT 29      Creatinine POCT 1.2 (*)     Hematocrit POCT 45      Calcium, Ionized Whole Blood POCT 4.7      UREA NITROGEN POCT 18      Glucose Whole Blood POCT 105 (*)     Potassium POCT 3.3 (*)     Sodium POCT 141      Hemoglobin POCT 15.3      Chloride POCT 98     AMMONIA - Normal    Ammonia 26     INR - Normal    INR 1.03     PARTIAL THROMBOPLASTIN TIME - Normal    aPTT 28     ROUTINE UA WITH MICROSCOPIC REFLEX TO CULTURE   TROPONIN T, HIGH SENSITIVITY      Emergency Department Course:       Reviewed:  I reviewed nursing notes, vitals, past medical history and Care Everywhere    Assessments:  1845 I obtained history and examined the patient as noted above.   2150 patient is sitting up on the commode and appears quite comfortable.  Daughter is at bedside and reports  that she is nearly back to her baseline.    Consults:   I consulted with stroke neurology about patient plan of care.     I consulted with stroke neurology about patient plan of care.   I consulted with radiology about patient and plan of care.    Disposition:  Care of the patient was transferred to my colleague Dr. Griffith pending repeat troponin and urinalysis..     Impression & Plan     CMS Diagnoses: None    Medical Decision Makin-year-old female with history of mild dementia presented to the ED with an abrupt mental status change at 1800 in which she became less responsive and complaining of diffuse pain.  Her examination was nonfocal but was significantly different than her baseline with mild somnolence, decreased orientation and repetitive speech about diffuse pain.  She had no stroke mimics including electrolyte disturbance or hypoglycemia.  She underwent tier 1 code stroke process.  CT scan was negative.  CTA revealed multiple areas of moderate and high-grade stenosis but no evidence of acute clot.  She underwent hyperacute MRI which ultimately was negative.  On reexamination, patient appeared back to her baseline.  I feel this is less suggestive of TIA given her appearance that was more consistent with encephalopathy as opposed to a focal neurological deficit.    The remainder of her laboratory studies were unremarkable with exception of an elevated troponin.  She denies chest pain.  EKG was without ischemic changes.  It is uncertain why her troponin is elevated but is less consistent with ACS.  I will perform a delta troponin.  I had a prolonged discussion with daughter about the utility of a delta troponin in the face of an elevated troponin of 20.  I informed her that a delta troponin would not definitively rule out heart disease but a lack of significant change would point away from ACS.  Given her dementia and goals of care, they would not want further invasive therapy including  coronary angiogram based on current evaluation.      Shared decision making was undertaken with myself and daughter. Decision was made that if delta troponin was not significant changed, they would not want admission for further investigation into CAD.  At this point patient is pending a second troponin and urinalysis.  If second troponin normal, as noted above, family would like discharge back to the skilled nursing facility.      Dr. Griffith will follow up on the urinalysis.  If present I would recommend a single dose of IV ceftriaxone followed by discharge home on cephalexin.  If negative, patient could be discharged home with close follow-up with her primary care physician.      Diagnosis:    ICD-10-CM    1. Altered mental status, unspecified altered mental status type  R41.82    2. Elevated troponin  R77.8        Discharge Medications:  New Prescriptions    No medications on file       Scribe Disclosure:  I, Chad Hart, am serving as a scribe at 6:48 PM on 7/29/2022 to document services personally performed by Renzo Oshea MD based on my observations and the provider's statements to me.          Renzo Oshea MD  07/29/22 1615

## 2022-07-30 VITALS
RESPIRATION RATE: 16 BRPM | HEART RATE: 90 BPM | OXYGEN SATURATION: 100 % | DIASTOLIC BLOOD PRESSURE: 72 MMHG | SYSTOLIC BLOOD PRESSURE: 110 MMHG

## 2022-07-31 LAB — BACTERIA UR CULT: ABNORMAL

## 2022-08-01 LAB
ATRIAL RATE - MUSE: 76 BPM
DIASTOLIC BLOOD PRESSURE - MUSE: NORMAL MMHG
INTERPRETATION ECG - MUSE: NORMAL
P AXIS - MUSE: -17 DEGREES
PR INTERVAL - MUSE: 196 MS
QRS DURATION - MUSE: 116 MS
QT - MUSE: 400 MS
QTC - MUSE: 450 MS
R AXIS - MUSE: -58 DEGREES
SYSTOLIC BLOOD PRESSURE - MUSE: NORMAL MMHG
T AXIS - MUSE: 118 DEGREES
VENTRICULAR RATE- MUSE: 76 BPM

## 2022-08-01 NOTE — RESULT ENCOUNTER NOTE
Final Urine Culture Report on 7/31/22  [facility] Emergency Dept discharge antibiotic prescribed: Cephalexin (Keflex) 500 mg capsule, 1 capsule (500 mg) by mouth 3 times daily for 10 days.  #1. Bacteria, >100,000 CFU/ML Escherichia coli , is SUSCEPTIBLE to Antibiotic.    No change in treatment per [facility] ED lab result Urine Culture protocol.

## 2023-01-09 ENCOUNTER — LAB REQUISITION (OUTPATIENT)
Dept: LAB | Facility: CLINIC | Age: 88
End: 2023-01-09
Payer: MEDICARE

## 2023-01-09 DIAGNOSIS — R60.9 EDEMA, UNSPECIFIED: ICD-10-CM

## 2023-01-09 DIAGNOSIS — D51.9 VITAMIN B12 DEFICIENCY ANEMIA, UNSPECIFIED: ICD-10-CM

## 2023-01-11 LAB
ALBUMIN SERPL BCG-MCNC: 3.8 G/DL (ref 3.5–5.2)
ALP SERPL-CCNC: 66 U/L (ref 35–104)
ALT SERPL W P-5'-P-CCNC: 18 U/L (ref 10–35)
ANION GAP SERPL CALCULATED.3IONS-SCNC: 15 MMOL/L (ref 7–15)
AST SERPL W P-5'-P-CCNC: 50 U/L (ref 10–35)
BILIRUB SERPL-MCNC: 0.3 MG/DL
BUN SERPL-MCNC: 20.4 MG/DL (ref 8–23)
CALCIUM SERPL-MCNC: 9.9 MG/DL (ref 8.2–9.6)
CHLORIDE SERPL-SCNC: 105 MMOL/L (ref 98–107)
CREAT SERPL-MCNC: 1.05 MG/DL (ref 0.51–0.95)
DEPRECATED HCO3 PLAS-SCNC: 24 MMOL/L (ref 22–29)
ERYTHROCYTE [DISTWIDTH] IN BLOOD BY AUTOMATED COUNT: 14 % (ref 10–15)
GFR SERPL CREATININE-BSD FRML MDRD: 50 ML/MIN/1.73M2
GLUCOSE SERPL-MCNC: 179 MG/DL (ref 70–99)
HCT VFR BLD AUTO: 47.8 % (ref 35–47)
HGB BLD-MCNC: 14.7 G/DL (ref 11.7–15.7)
MCH RBC QN AUTO: 27.3 PG (ref 26.5–33)
MCHC RBC AUTO-ENTMCNC: 30.8 G/DL (ref 31.5–36.5)
MCV RBC AUTO: 89 FL (ref 78–100)
PLATELET # BLD AUTO: 386 10E3/UL (ref 150–450)
POTASSIUM SERPL-SCNC: 4.5 MMOL/L (ref 3.4–5.3)
PROT SERPL-MCNC: 7.9 G/DL (ref 6.4–8.3)
RBC # BLD AUTO: 5.39 10E6/UL (ref 3.8–5.2)
SODIUM SERPL-SCNC: 144 MMOL/L (ref 136–145)
WBC # BLD AUTO: 10.5 10E3/UL (ref 4–11)

## 2023-01-11 PROCEDURE — P9604 ONE-WAY ALLOW PRORATED TRIP: HCPCS | Mod: ORL | Performed by: FAMILY MEDICINE

## 2023-01-11 PROCEDURE — 85027 COMPLETE CBC AUTOMATED: CPT | Mod: ORL | Performed by: FAMILY MEDICINE

## 2023-01-11 PROCEDURE — 36415 COLL VENOUS BLD VENIPUNCTURE: CPT | Mod: ORL | Performed by: FAMILY MEDICINE

## 2023-01-11 PROCEDURE — 80053 COMPREHEN METABOLIC PANEL: CPT | Mod: ORL | Performed by: FAMILY MEDICINE

## 2023-03-21 ENCOUNTER — LAB REQUISITION (OUTPATIENT)
Dept: LAB | Facility: CLINIC | Age: 88
End: 2023-03-21
Payer: MEDICARE

## 2023-03-21 DIAGNOSIS — Z79.899 OTHER LONG TERM (CURRENT) DRUG THERAPY: ICD-10-CM

## 2023-03-22 LAB
ALBUMIN SERPL BCG-MCNC: 3.6 G/DL (ref 3.5–5.2)
ALP SERPL-CCNC: 70 U/L (ref 35–104)
ALT SERPL W P-5'-P-CCNC: 13 U/L (ref 10–35)
ANION GAP SERPL CALCULATED.3IONS-SCNC: 13 MMOL/L (ref 7–15)
AST SERPL W P-5'-P-CCNC: 35 U/L (ref 10–35)
BILIRUB SERPL-MCNC: 0.3 MG/DL
BUN SERPL-MCNC: 19.3 MG/DL (ref 8–23)
CALCIUM SERPL-MCNC: 9.5 MG/DL (ref 8.2–9.6)
CHLORIDE SERPL-SCNC: 104 MMOL/L (ref 98–107)
CHOLEST SERPL-MCNC: 218 MG/DL
CREAT SERPL-MCNC: 1.02 MG/DL (ref 0.51–0.95)
DEPRECATED HCO3 PLAS-SCNC: 25 MMOL/L (ref 22–29)
ERYTHROCYTE [DISTWIDTH] IN BLOOD BY AUTOMATED COUNT: 14.7 % (ref 10–15)
GFR SERPL CREATININE-BSD FRML MDRD: 52 ML/MIN/1.73M2
GLUCOSE SERPL-MCNC: 171 MG/DL (ref 70–99)
HBA1C MFR BLD: 6.2 %
HCT VFR BLD AUTO: 46.7 % (ref 35–47)
HDLC SERPL-MCNC: 26 MG/DL
HGB BLD-MCNC: 14 G/DL (ref 11.7–15.7)
LDLC SERPL CALC-MCNC: 141 MG/DL
MCH RBC QN AUTO: 26 PG (ref 26.5–33)
MCHC RBC AUTO-ENTMCNC: 30 G/DL (ref 31.5–36.5)
MCV RBC AUTO: 87 FL (ref 78–100)
NONHDLC SERPL-MCNC: 192 MG/DL
PLATELET # BLD AUTO: 377 10E3/UL (ref 150–450)
POTASSIUM SERPL-SCNC: 4 MMOL/L (ref 3.4–5.3)
PROT SERPL-MCNC: 7.6 G/DL (ref 6.4–8.3)
RBC # BLD AUTO: 5.38 10E6/UL (ref 3.8–5.2)
SODIUM SERPL-SCNC: 142 MMOL/L (ref 136–145)
TRIGL SERPL-MCNC: 256 MG/DL
VALPROATE SERPL-MCNC: 18.7 UG/ML
WBC # BLD AUTO: 9.2 10E3/UL (ref 4–11)

## 2023-03-22 PROCEDURE — 80053 COMPREHEN METABOLIC PANEL: CPT | Mod: ORL | Performed by: NURSE PRACTITIONER

## 2023-03-22 PROCEDURE — 80164 ASSAY DIPROPYLACETIC ACD TOT: CPT | Mod: ORL | Performed by: NURSE PRACTITIONER

## 2023-03-22 PROCEDURE — 36415 COLL VENOUS BLD VENIPUNCTURE: CPT | Mod: ORL | Performed by: NURSE PRACTITIONER

## 2023-03-22 PROCEDURE — 83036 HEMOGLOBIN GLYCOSYLATED A1C: CPT | Mod: ORL | Performed by: NURSE PRACTITIONER

## 2023-03-22 PROCEDURE — 80061 LIPID PANEL: CPT | Mod: ORL | Performed by: NURSE PRACTITIONER

## 2023-03-22 PROCEDURE — 85027 COMPLETE CBC AUTOMATED: CPT | Mod: ORL | Performed by: NURSE PRACTITIONER

## 2023-03-22 PROCEDURE — P9604 ONE-WAY ALLOW PRORATED TRIP: HCPCS | Mod: ORL | Performed by: NURSE PRACTITIONER

## 2023-04-25 ENCOUNTER — LAB REQUISITION (OUTPATIENT)
Dept: LAB | Facility: CLINIC | Age: 88
End: 2023-04-25
Payer: MEDICARE

## 2023-04-25 DIAGNOSIS — Z79.899 OTHER LONG TERM (CURRENT) DRUG THERAPY: ICD-10-CM

## 2023-04-26 LAB
ALBUMIN SERPL BCG-MCNC: 3.5 G/DL (ref 3.5–5.2)
ALP SERPL-CCNC: 66 U/L (ref 35–104)
ALT SERPL W P-5'-P-CCNC: ABNORMAL U/L
ANION GAP SERPL CALCULATED.3IONS-SCNC: 15 MMOL/L (ref 7–15)
AST SERPL W P-5'-P-CCNC: ABNORMAL U/L
BILIRUB SERPL-MCNC: 0.3 MG/DL
BUN SERPL-MCNC: 16.4 MG/DL (ref 8–23)
CALCIUM SERPL-MCNC: 9.3 MG/DL (ref 8.2–9.6)
CHLORIDE SERPL-SCNC: 102 MMOL/L (ref 98–107)
CREAT SERPL-MCNC: 0.99 MG/DL (ref 0.51–0.95)
DEPRECATED HCO3 PLAS-SCNC: 23 MMOL/L (ref 22–29)
GFR SERPL CREATININE-BSD FRML MDRD: 54 ML/MIN/1.73M2
GLUCOSE SERPL-MCNC: 233 MG/DL (ref 70–99)
POTASSIUM SERPL-SCNC: 3.9 MMOL/L (ref 3.4–5.3)
PROT SERPL-MCNC: 7.5 G/DL (ref 6.4–8.3)
SODIUM SERPL-SCNC: 140 MMOL/L (ref 136–145)
VALPROATE SERPL-MCNC: 29.3 UG/ML

## 2023-04-26 PROCEDURE — P9604 ONE-WAY ALLOW PRORATED TRIP: HCPCS | Mod: ORL | Performed by: NURSE PRACTITIONER

## 2023-04-26 PROCEDURE — 36415 COLL VENOUS BLD VENIPUNCTURE: CPT | Mod: ORL | Performed by: NURSE PRACTITIONER

## 2023-04-26 PROCEDURE — 80164 ASSAY DIPROPYLACETIC ACD TOT: CPT | Mod: ORL | Performed by: NURSE PRACTITIONER

## 2023-04-26 PROCEDURE — 85027 COMPLETE CBC AUTOMATED: CPT | Mod: ORL | Performed by: NURSE PRACTITIONER

## 2023-04-26 PROCEDURE — 84155 ASSAY OF PROTEIN SERUM: CPT | Mod: ORL | Performed by: NURSE PRACTITIONER

## 2023-05-04 LAB
ERYTHROCYTE [DISTWIDTH] IN BLOOD BY AUTOMATED COUNT: 15.2 % (ref 10–15)
HCT VFR BLD AUTO: 44.7 % (ref 35–47)
HGB BLD-MCNC: 13.1 G/DL (ref 11.7–15.7)
MCH RBC QN AUTO: 25 PG (ref 26.5–33)
MCHC RBC AUTO-ENTMCNC: 29.3 G/DL (ref 31.5–36.5)
MCV RBC AUTO: 85 FL (ref 78–100)
PLATELET # BLD AUTO: 397 10E3/UL (ref 150–450)
RBC # BLD AUTO: 5.24 10E6/UL (ref 3.8–5.2)
WBC # BLD AUTO: 7 10E3/UL (ref 4–11)

## 2023-06-23 ENCOUNTER — HOSPITAL ENCOUNTER (EMERGENCY)
Facility: CLINIC | Age: 88
Discharge: HOME | End: 2023-06-23
Attending: EMERGENCY MEDICINE | Admitting: EMERGENCY MEDICINE
Payer: MEDICARE

## 2023-06-23 ENCOUNTER — MEDICAL CORRESPONDENCE (OUTPATIENT)
Dept: EMERGENCY MEDICINE | Facility: CLINIC | Age: 88
End: 2023-06-23

## 2023-06-23 VITALS
DIASTOLIC BLOOD PRESSURE: 79 MMHG | RESPIRATION RATE: 18 BRPM | SYSTOLIC BLOOD PRESSURE: 152 MMHG | TEMPERATURE: 97.1 F | OXYGEN SATURATION: 94 % | HEART RATE: 67 BPM

## 2023-06-23 DIAGNOSIS — R09.89 CHOKING EPISODE: ICD-10-CM

## 2023-06-23 PROCEDURE — 99283 EMERGENCY DEPT VISIT LOW MDM: CPT

## 2023-06-23 ASSESSMENT — ACTIVITIES OF DAILY LIVING (ADL): ADLS_ACUITY_SCORE: 37

## 2023-06-23 NOTE — ED PROVIDER NOTES
History     Chief Complaint:  Choking       HPI   Tina Powell is a 89 year old female who presents emergency department with her daughter with concerns for a choking episode for which EMS recommended assessment to the emergency department.  Per the daughter's report from the nursing facility, the patient choked on a chunk of waffle.  The Heimlich maneuver was performed and the chunk came out.  The patient was briefly hypoxic after the maneuver, so she was recommended to come to the emergency department for assessment.  Her daughter notes she is currently acting normal.  The patient denies any shortness of breath, chest pain, throat pain or any other symptom.  She feels normal.  Her daughter notes she typically tries to avoid large bites of food and she has had some choking spells in the past.  Her daughter does not feel she needs any further evaluation.      Independent Historian:   Daughter - They report as above    Review of External Notes:   None pertinent to today's evaluation      Medications:    acetaminophen (TYLENOL) 325 MG tablet  adalimumab (HUMIRA) 20 MG/0.4ML prefilled syringe kit  cholecalciferol (VITAMIN D) 1000 UNIT tablet  clobetasol (TEMOVATE) 0.05 % ointment  clopidogrel (PLAVIX) 75 MG tablet  cyanocobalamin (VITAMIN B12) 1000 MCG/ML injection  fenofibrate 48 MG tablet  hydrochlorothiazide 12.5 MG TABS tablet  HYDROcodone-acetaminophen (NORCO) 5-325 MG per tablet  Magnesium 400 MG CAPS  nitroFURantoin, macrocrystal-monohydrate, (MACROBID) 100 MG capsule  omega 3 1000 MG CAPS  omeprazole (PRILOSEC) 20 MG CR capsule  pyridoxine (VITAMIN B-6) 50 MG TABS  QUEtiapine (SEROQUEL) 25 MG tablet  venlafaxine (EFFEXOR-XR) 37.5 MG 24 hr capsule  venlafaxine (EFFEXOR-XR) 75 MG 24 hr capsule        Past Medical History:    Past Medical History:   Diagnosis Date     Depressed      Dyslipidemia      H/O: CVA (cerebrovascular accident) 2014     HTN (hypertension)      Hyperlipidaemia LDL goal < 100       Psoriases      Risk for falls      Urosepsis 6-2007       Past Surgical History:    Past Surgical History:   Procedure Laterality Date     HYSTERECTOMY  1950    fibroids        Physical Exam     Patient Vitals for the past 24 hrs:   BP Temp Temp src Pulse Resp SpO2   06/23/23 1311 -- -- -- -- -- 94 %   06/23/23 1310 (!) 152/79 -- -- 67 -- 94 %   06/23/23 1309 (!) 143/86 -- -- 68 -- --   06/23/23 1300 123/74 -- -- 65 -- --   06/23/23 1245 125/65 -- -- 65 -- --   06/23/23 1230 131/70 -- -- 64 -- --   06/23/23 1218 134/70 -- -- 63 18 98 %   06/23/23 1203 128/62 -- -- 67 -- --   06/23/23 1148 121/63 -- -- 68 -- 96 %   06/23/23 1133 128/69 -- -- 69 -- 96 %   06/23/23 1132 129/68 97.1  F (36.2  C) Oral 78 20 96 %        Physical Exam  General: Elderly female sitting upright  Eyes: PERRL, Conjunctive within normal limits  ENT: Moist mucous membranes, oropharynx clear.   CV: Normal S1S2, no murmur, rub or gallop. Regular rate and rhythm  Resp: Clear to auscultation bilaterally, no wheezes, rales or rhonchi, mildly diminished at the bases. Normal respiratory effort.  GI: Abdomen is soft, nontender and nondistended.   MSK: No chest wall tenderness to palpation  Skin: Warm and dry. No rashes or lesions or ecchymoses on visible skin.  Neuro: Alert and oriented to person and place.  Psych: Normal mood and affect. Pleasant.    Emergency Department Course   Emergency Department Course & Assessments:    Assessments:  I performed an exam of the patient and obtained history, as documented above.  I discussed findings and plan with the daughter and patient.    Social Determinants of Health affecting care:   None    Disposition:  The patient was discharged to home.     Impression & Plan      Medical Decision Making:  Tina Powell is a 89-year-old female presents emergency department after choking episode on waffle.  After performing a Heimlich she was briefly hypoxic but is not here.  She is denying symptoms.  She does not appear  short of breath or uncomfortable.  There are no intraoral abnormalities.  She has no evidence of chest contusion or rib fracture.  No indication for imaging or further testing at this time given her return to baseline and well appearance.  Given appropriate return precautions and follow-up instructions.  All questions were answered prior to discharge.      Diagnosis:    ICD-10-CM    1. Choking episode  R09.89              Jana Horne MD  6/23/2023        Jana Horne MD  06/23/23 1418

## 2023-06-23 NOTE — ED TRIAGE NOTES
Pt arrives via EMS from Nursing home after chocking on a waffle PTA. Pt coughed and held tilted forward. Heimlich maneuver perfromed, waffle chunk flew out. Airway and lungs clear. ABC intact. Dementia at baseline. Sating 96% on RA.      Triage Assessment     Row Name 06/23/23 1133       Triage Assessment (Adult)    Airway WDL WDL       Respiratory WDL    Respiratory WDL WDL       Skin Circulation/Temperature WDL    Skin Circulation/Temperature WDL WDL       Cardiac WDL    Cardiac WDL WDL       Peripheral/Neurovascular WDL    Peripheral Neurovascular WDL WDL       Cognitive/Neuro/Behavioral WDL    Cognitive/Neuro/Behavioral WDL X

## 2023-06-27 ENCOUNTER — DOCUMENTATION ONLY (OUTPATIENT)
Dept: OTHER | Facility: CLINIC | Age: 88
End: 2023-06-27
Payer: MEDICARE

## 2023-07-05 ENCOUNTER — LAB REQUISITION (OUTPATIENT)
Dept: LAB | Facility: CLINIC | Age: 88
End: 2023-07-05
Payer: MEDICARE

## 2023-07-05 DIAGNOSIS — R35.0 FREQUENCY OF MICTURITION: ICD-10-CM

## 2023-07-05 LAB
ALBUMIN UR-MCNC: 10 MG/DL
APPEARANCE UR: ABNORMAL
BACTERIA #/AREA URNS HPF: ABNORMAL /HPF
BILIRUB UR QL STRIP: NEGATIVE
COLOR UR AUTO: YELLOW
GLUCOSE UR STRIP-MCNC: NEGATIVE MG/DL
HGB UR QL STRIP: NEGATIVE
KETONES UR STRIP-MCNC: NEGATIVE MG/DL
LEUKOCYTE ESTERASE UR QL STRIP: ABNORMAL
MUCOUS THREADS #/AREA URNS LPF: PRESENT /LPF
NITRATE UR QL: NEGATIVE
PH UR STRIP: 6 [PH] (ref 5–7)
RBC URINE: 1 /HPF
SP GR UR STRIP: 1.02 (ref 1–1.03)
SQUAMOUS EPITHELIAL: <1 /HPF
TRANSITIONAL EPI: <1 /HPF
UROBILINOGEN UR STRIP-MCNC: NORMAL MG/DL
WBC URINE: 38 /HPF

## 2023-07-05 PROCEDURE — 87186 SC STD MICRODIL/AGAR DIL: CPT | Mod: ORL | Performed by: NURSE PRACTITIONER

## 2023-07-05 PROCEDURE — 81001 URINALYSIS AUTO W/SCOPE: CPT | Mod: ORL | Performed by: NURSE PRACTITIONER

## 2023-07-06 LAB — BACTERIA UR CULT: ABNORMAL

## 2023-07-25 ENCOUNTER — LAB REQUISITION (OUTPATIENT)
Dept: LAB | Facility: CLINIC | Age: 88
End: 2023-07-25
Payer: MEDICARE

## 2023-07-25 LAB
ALBUMIN UR-MCNC: NEGATIVE MG/DL
APPEARANCE UR: CLEAR
BILIRUB UR QL STRIP: NEGATIVE
COLOR UR AUTO: ABNORMAL
GLUCOSE UR STRIP-MCNC: NEGATIVE MG/DL
HGB UR QL STRIP: NEGATIVE
KETONES UR STRIP-MCNC: NEGATIVE MG/DL
LEUKOCYTE ESTERASE UR QL STRIP: NEGATIVE
NITRATE UR QL: NEGATIVE
PH UR STRIP: 7.5 [PH] (ref 5–7)
RBC URINE: <1 /HPF
SP GR UR STRIP: 1 (ref 1–1.03)
UROBILINOGEN UR STRIP-MCNC: NORMAL MG/DL
WBC URINE: <1 /HPF

## 2023-07-25 PROCEDURE — 81001 URINALYSIS AUTO W/SCOPE: CPT | Mod: ORL | Performed by: NURSE PRACTITIONER

## 2023-08-18 NOTE — ED AVS SNAPSHOT
[facility]    [address]    [facility] [address]    Phone:  [phone]    Fax:  [phone]                                       Tina Powell   MRN: [id number]    Department:  [facility]   Date of Visit:  8/7/2018           After Visit Summary Signature Page     I have received my discharge instructions, and my questions have been answered. I have discussed any challenges I see with this plan with the nurse or doctor.    ..........................................................................................................................................  Patient/Patient Representative Signature      ..........................................................................................................................................  Patient Representative Print Name and Relationship to Patient    ..................................................               ................................................  Date                                            Time    ..........................................................................................................................................  Reviewed by Signature/Title    ...................................................              ..............................................  Date                                                            Time           Number Of Hemigard Strips Per Side: 1

## 2023-09-08 PROCEDURE — 87186 SC STD MICRODIL/AGAR DIL: CPT | Mod: ORL | Performed by: NURSE PRACTITIONER

## 2023-09-08 PROCEDURE — 87086 URINE CULTURE/COLONY COUNT: CPT | Mod: ORL | Performed by: NURSE PRACTITIONER

## 2023-09-08 PROCEDURE — 81001 URINALYSIS AUTO W/SCOPE: CPT | Mod: ORL | Performed by: NURSE PRACTITIONER

## 2023-09-09 ENCOUNTER — LAB REQUISITION (OUTPATIENT)
Dept: LAB | Facility: CLINIC | Age: 88
End: 2023-09-09
Payer: OTHER MISCELLANEOUS

## 2023-09-09 DIAGNOSIS — R41.82 ALTERED MENTAL STATUS, UNSPECIFIED: ICD-10-CM

## 2023-09-09 LAB
ALBUMIN UR-MCNC: 20 MG/DL
APPEARANCE UR: ABNORMAL
BACTERIA #/AREA URNS HPF: ABNORMAL /HPF
BILIRUB UR QL STRIP: NEGATIVE
COLOR UR AUTO: YELLOW
GLUCOSE UR STRIP-MCNC: NEGATIVE MG/DL
HGB UR QL STRIP: NEGATIVE
HYALINE CASTS: 1 /LPF
KETONES UR STRIP-MCNC: NEGATIVE MG/DL
LEUKOCYTE ESTERASE UR QL STRIP: ABNORMAL
MUCOUS THREADS #/AREA URNS LPF: PRESENT /LPF
NITRATE UR QL: POSITIVE
PH UR STRIP: 6 [PH] (ref 5–7)
RBC URINE: 3 /HPF
SP GR UR STRIP: 1.03 (ref 1–1.03)
SQUAMOUS EPITHELIAL: 2 /HPF
TRANSITIONAL EPI: 1 /HPF
UROBILINOGEN UR STRIP-MCNC: NORMAL MG/DL
WBC URINE: 142 /HPF

## 2023-09-10 LAB
BACTERIA UR CULT: ABNORMAL
BACTERIA UR CULT: ABNORMAL

## 2023-09-19 ENCOUNTER — LAB REQUISITION (OUTPATIENT)
Dept: LAB | Facility: CLINIC | Age: 88
End: 2023-09-19
Payer: OTHER MISCELLANEOUS

## 2023-09-19 DIAGNOSIS — R41.82 ALTERED MENTAL STATUS, UNSPECIFIED: ICD-10-CM

## 2023-09-19 LAB
ALBUMIN UR-MCNC: 20 MG/DL
APPEARANCE UR: ABNORMAL
BACTERIA #/AREA URNS HPF: ABNORMAL /HPF
BILIRUB UR QL STRIP: NEGATIVE
CAOX CRY #/AREA URNS HPF: ABNORMAL /HPF
COLOR UR AUTO: YELLOW
GLUCOSE UR STRIP-MCNC: NEGATIVE MG/DL
HGB UR QL STRIP: NEGATIVE
KETONES UR STRIP-MCNC: NEGATIVE MG/DL
LEUKOCYTE ESTERASE UR QL STRIP: ABNORMAL
MUCOUS THREADS #/AREA URNS LPF: PRESENT /LPF
NITRATE UR QL: POSITIVE
PH UR STRIP: 6.5 [PH] (ref 5–7)
RBC URINE: 3 /HPF
SP GR UR STRIP: 1.02 (ref 1–1.03)
SQUAMOUS EPITHELIAL: 4 /HPF
TRANSITIONAL EPI: 1 /HPF
UROBILINOGEN UR STRIP-MCNC: NORMAL MG/DL
WBC URINE: 64 /HPF

## 2023-09-19 PROCEDURE — 81001 URINALYSIS AUTO W/SCOPE: CPT | Mod: ORL | Performed by: NURSE PRACTITIONER

## 2023-09-19 PROCEDURE — 87086 URINE CULTURE/COLONY COUNT: CPT | Mod: ORL | Performed by: NURSE PRACTITIONER

## 2023-09-21 LAB — BACTERIA UR CULT: NORMAL

## 2024-04-27 NOTE — ED NOTES
I received signout from my partner, Dr. Oshea.  Please see his H&P for full specifics.  Briefly, the patient had sudden confusion that led to her presentation today.  CT/CTA/MRI were negative for stroke.  The patient's troponin is elevated but has been stable on recheck.  Family would not want aggressive measures or angiography anyway.  Urinalysis was pending at the time of signout.  I was asked to follow-up on this.  Family was comfortable and willing to take the patient home even if she has a UTI.      2328: Urinalysis is noted below and is remarkable for nitrite positive and leukocyte esterase large urine with 140 white blood cells per high-powered field.  Rocephin will be administered in the emergency department. I updated the family.  They are comfortable with the patient going back to her care facility [facility].    ROUTINE UA WITH MICROSCOPIC REFLEX TO CULTURE - Abnormal       Result Value    Color Urine Yellow      Appearance Urine Slightly Cloudy (*)     Glucose Urine Negative      Bilirubin Urine Negative      Ketones Urine Negative      Specific Gravity Urine 1.025      Blood Urine Negative      pH Urine 6.0      Protein Albumin Urine 20  (*)     Urobilinogen Urine 2.0      Nitrite Urine Positive (*)     Leukocyte Esterase Urine Large (*)     Bacteria Urine Few (*)     Mucus Urine Present (*)     RBC Urine 3 (*)     WBC Urine 140 (*)     Squamous Epithelials Urine 1       Impression:    ICD-10-CM    1. Altered mental status, unspecified altered mental status type  R41.82    2. Elevated troponin  R77.8          New Prescriptions    CEPHALEXIN (KEFLEX) 500 MG CAPSULE    Take 1 capsule (500 mg) by mouth 3 times daily for 10 days          Ravi Griffith DO  07/29/22 2754     patient/family

## 2024-08-06 NOTE — TELEPHONE ENCOUNTER
"Daughter Stacy called from [place] and states she feels her mother sounds \"loopy\".  She feels it is from the gabapentin.  She was put on the gabapentin 300 mg and to take one tab x one day and then bid for 1 day and then go up to 3 daily.  She had been having some sciatic nerve pain.  Called Tina and told her to reduce the gabapentin  To bid - take am and hs.  To call if still feeling drowsy and loopy.  Patient agreeable to plan.  "
Detail Level: Detailed

## 2024-08-12 ENCOUNTER — LAB REQUISITION (OUTPATIENT)
Dept: LAB | Facility: CLINIC | Age: 89
End: 2024-08-12
Payer: MEDICARE

## 2024-08-12 DIAGNOSIS — R30.0 DYSURIA: ICD-10-CM

## 2024-08-13 ENCOUNTER — LAB REQUISITION (OUTPATIENT)
Dept: LAB | Facility: CLINIC | Age: 89
End: 2024-08-13
Payer: OTHER MISCELLANEOUS

## 2024-08-13 DIAGNOSIS — N39.0 URINARY TRACT INFECTION, SITE NOT SPECIFIED: ICD-10-CM

## 2024-08-13 LAB
ALBUMIN UR-MCNC: NEGATIVE MG/DL
APPEARANCE UR: ABNORMAL
BACTERIA #/AREA URNS HPF: ABNORMAL /HPF
BILIRUB UR QL STRIP: NEGATIVE
COLOR UR AUTO: ABNORMAL
GLUCOSE UR STRIP-MCNC: NEGATIVE MG/DL
HGB UR QL STRIP: NEGATIVE
KETONES UR STRIP-MCNC: NEGATIVE MG/DL
LEUKOCYTE ESTERASE UR QL STRIP: ABNORMAL
NITRATE UR QL: POSITIVE
PH UR STRIP: 7 [PH] (ref 5–7)
RBC URINE: 0 /HPF
SP GR UR STRIP: 1.01 (ref 1–1.03)
SQUAMOUS EPITHELIAL: 1 /HPF
TRANSITIONAL EPI: <1 /HPF
UROBILINOGEN UR STRIP-MCNC: NORMAL MG/DL
WBC CLUMPS #/AREA URNS HPF: PRESENT /HPF
WBC URINE: 38 /HPF

## 2024-08-13 PROCEDURE — 81001 URINALYSIS AUTO W/SCOPE: CPT | Mod: ORL | Performed by: NURSE PRACTITIONER

## 2024-08-13 PROCEDURE — 87186 SC STD MICRODIL/AGAR DIL: CPT | Mod: ORL | Performed by: NURSE PRACTITIONER

## 2024-08-13 PROCEDURE — 87086 URINE CULTURE/COLONY COUNT: CPT | Mod: ORL | Performed by: NURSE PRACTITIONER

## 2024-08-16 LAB
BACTERIA UR CULT: ABNORMAL
BACTERIA UR CULT: ABNORMAL

## 2024-09-25 ENCOUNTER — LAB REQUISITION (OUTPATIENT)
Dept: LAB | Facility: CLINIC | Age: 89
End: 2024-09-25
Payer: OTHER MISCELLANEOUS

## 2024-09-25 DIAGNOSIS — R41.82 ALTERED MENTAL STATUS, UNSPECIFIED: ICD-10-CM

## 2024-09-25 LAB
ALBUMIN UR-MCNC: 10 MG/DL
APPEARANCE UR: ABNORMAL
BACTERIA #/AREA URNS HPF: ABNORMAL /HPF
BILIRUB UR QL STRIP: NEGATIVE
COLOR UR AUTO: YELLOW
GLUCOSE UR STRIP-MCNC: NEGATIVE MG/DL
HGB UR QL STRIP: ABNORMAL
KETONES UR STRIP-MCNC: NEGATIVE MG/DL
LEUKOCYTE ESTERASE UR QL STRIP: ABNORMAL
MUCOUS THREADS #/AREA URNS LPF: PRESENT /LPF
NITRATE UR QL: POSITIVE
PH UR STRIP: 6.5 [PH] (ref 5–7)
RBC URINE: 5 /HPF
SP GR UR STRIP: 1.02 (ref 1–1.03)
SQUAMOUS EPITHELIAL: 1 /HPF
TRANSITIONAL EPI: 1 /HPF
UROBILINOGEN UR STRIP-MCNC: NORMAL MG/DL
WBC CLUMPS #/AREA URNS HPF: PRESENT /HPF
WBC URINE: >182 /HPF

## 2024-09-25 PROCEDURE — 87186 SC STD MICRODIL/AGAR DIL: CPT | Mod: ORL | Performed by: NURSE PRACTITIONER

## 2024-09-25 PROCEDURE — 81001 URINALYSIS AUTO W/SCOPE: CPT | Mod: ORL | Performed by: NURSE PRACTITIONER

## 2024-09-26 LAB — BACTERIA UR CULT: ABNORMAL
